# Patient Record
Sex: FEMALE | Race: WHITE | NOT HISPANIC OR LATINO | Employment: OTHER | URBAN - METROPOLITAN AREA
[De-identification: names, ages, dates, MRNs, and addresses within clinical notes are randomized per-mention and may not be internally consistent; named-entity substitution may affect disease eponyms.]

---

## 2020-12-01 PROBLEM — E11.8 CONTROLLED DIABETES MELLITUS TYPE 2 WITH COMPLICATIONS (HCC): Status: ACTIVE | Noted: 2020-12-01

## 2020-12-01 PROBLEM — E78.2 MIXED HYPERLIPIDEMIA: Status: ACTIVE | Noted: 2020-12-01

## 2020-12-01 PROBLEM — E03.4 HYPOTHYROIDISM DUE TO ACQUIRED ATROPHY OF THYROID: Status: ACTIVE | Noted: 2020-12-01

## 2020-12-01 PROBLEM — I10 ESSENTIAL HYPERTENSION: Status: ACTIVE | Noted: 2020-12-01

## 2020-12-01 PROBLEM — K21.9 GASTROESOPHAGEAL REFLUX DISEASE WITHOUT ESOPHAGITIS: Status: ACTIVE | Noted: 2020-12-01

## 2020-12-02 ENCOUNTER — TELEMEDICINE (OUTPATIENT)
Dept: MEDICAL GROUP | Facility: MEDICAL CENTER | Age: 75
End: 2020-12-02
Payer: MEDICARE

## 2020-12-02 DIAGNOSIS — I10 ESSENTIAL HYPERTENSION: ICD-10-CM

## 2020-12-02 DIAGNOSIS — E78.2 MIXED HYPERLIPIDEMIA: ICD-10-CM

## 2020-12-02 DIAGNOSIS — B02.29 POSTHERPETIC NEURALGIA: ICD-10-CM

## 2020-12-02 DIAGNOSIS — E03.4 HYPOTHYROIDISM DUE TO ACQUIRED ATROPHY OF THYROID: ICD-10-CM

## 2020-12-02 DIAGNOSIS — N89.8 VAGINAL DRYNESS: ICD-10-CM

## 2020-12-02 DIAGNOSIS — E11.8 CONTROLLED TYPE 2 DIABETES MELLITUS WITH COMPLICATION, WITHOUT LONG-TERM CURRENT USE OF INSULIN (HCC): ICD-10-CM

## 2020-12-02 DIAGNOSIS — K21.9 GASTROESOPHAGEAL REFLUX DISEASE WITHOUT ESOPHAGITIS: ICD-10-CM

## 2020-12-02 PROCEDURE — 99204 OFFICE O/P NEW MOD 45 MIN: CPT | Performed by: FAMILY MEDICINE

## 2020-12-02 RX ORDER — LEVOTHYROXINE SODIUM 0.07 MG/1
75 TABLET ORAL
Qty: 90 TAB | Refills: 3 | Status: SHIPPED | OUTPATIENT
Start: 2020-12-02 | End: 2021-09-14

## 2020-12-02 RX ORDER — GABAPENTIN 100 MG/1
200 CAPSULE ORAL 2 TIMES DAILY
Qty: 360 CAP | Refills: 2 | Status: SHIPPED | OUTPATIENT
Start: 2020-12-02 | End: 2021-02-09

## 2020-12-02 RX ORDER — LEVOTHYROXINE SODIUM 0.07 MG/1
TABLET ORAL
Qty: 30 TAB | Status: CANCELLED | OUTPATIENT
Start: 2020-12-02 | End: 2022-12-02

## 2020-12-02 RX ORDER — OMEPRAZOLE 20 MG/1
20 CAPSULE, DELAYED RELEASE ORAL DAILY
Qty: 90 CAP | Refills: 3 | Status: SHIPPED | OUTPATIENT
Start: 2020-12-02 | End: 2021-01-11 | Stop reason: SDUPTHER

## 2020-12-02 RX ORDER — GABAPENTIN 100 MG/1
CAPSULE ORAL
Qty: 90 CAP | Status: CANCELLED | OUTPATIENT
Start: 2020-12-02 | End: 2022-12-02

## 2020-12-02 RX ORDER — METFORMIN HYDROCHLORIDE 500 MG/1
500 TABLET, EXTENDED RELEASE ORAL 2 TIMES DAILY
Qty: 180 TAB | Refills: 3 | Status: SHIPPED | OUTPATIENT
Start: 2020-12-02 | End: 2021-09-14

## 2020-12-02 RX ORDER — ATORVASTATIN CALCIUM 40 MG/1
TABLET, FILM COATED ORAL
Qty: 30 TAB | Status: CANCELLED | OUTPATIENT
Start: 2020-12-02 | End: 2022-12-02

## 2020-12-02 RX ORDER — ATORVASTATIN CALCIUM 40 MG/1
40 TABLET, FILM COATED ORAL DAILY
Qty: 90 TAB | Refills: 3 | Status: SHIPPED | OUTPATIENT
Start: 2020-12-02 | End: 2021-09-14

## 2020-12-02 RX ORDER — METFORMIN HYDROCHLORIDE 500 MG/1
TABLET, EXTENDED RELEASE ORAL
Qty: 30 TAB | Status: CANCELLED | OUTPATIENT
Start: 2020-12-02 | End: 2022-12-02

## 2020-12-02 RX ORDER — METOPROLOL TARTRATE 100 MG/1
100 TABLET ORAL 2 TIMES DAILY
Qty: 180 TAB | Refills: 3 | Status: SHIPPED | OUTPATIENT
Start: 2020-12-02 | End: 2021-09-14

## 2020-12-02 RX ORDER — ESTRADIOL 0.1 MG/G
CREAM VAGINAL
Qty: 42.5 G | Status: CANCELLED | OUTPATIENT
Start: 2020-12-02 | End: 2024-06-21

## 2020-12-02 RX ORDER — LOSARTAN POTASSIUM 25 MG/1
25 TABLET ORAL DAILY
Qty: 90 TAB | Refills: 3 | Status: SHIPPED | OUTPATIENT
Start: 2020-12-02 | End: 2020-12-20 | Stop reason: SDUPTHER

## 2020-12-02 RX ORDER — METOPROLOL TARTRATE 100 MG/1
100 TABLET ORAL
Qty: 60 TAB | Status: CANCELLED | OUTPATIENT
Start: 2020-12-02 | End: 2022-12-02

## 2020-12-02 RX ORDER — LOSARTAN POTASSIUM 25 MG/1
TABLET ORAL
Qty: 30 TAB | Status: CANCELLED | OUTPATIENT
Start: 2020-12-02 | End: 2025-03-22

## 2020-12-03 NOTE — PROGRESS NOTES
Telemedicine Video Visit: Established Patient   This Remote Face to Face encounter was conducted via Zoom. Given the importance of social distancing and other strategies recommended to reduce the risk of COVID-19 transmission, I am providing medical care to this patient via audio/video visit in place of an in person visit at the request of the patient. Verbal consent to telehealth, risks, benefits, and consequences were discussed. Patient retains the right to withdraw at any time. All existing confidentiality protections apply. The patient has access to all transmitted medical information. No dissemination of any patient images or information to other entities without further written consent.  Subjective:   No chief complaint on file.    CC: Diabetes, hypertension, hyperlipidemia, hypothyroidism, acid reflux, vaginal dryness, postherpetic neuralgia    Kristin Reynolds Sophie is a 75 y.o. female establishing PCP.    Patient has been active and independent with all ADLs.  Has the following chronic medical issues:    Controlled type 2 diabetes mellitus with complication, without long-term current use of insulin ([facility])  Blood glucose has been controlled.  Last A1c was 7.4.  Denies polyuria and polydipsia.  Patient has been on Metformin  mg twice a day.  No side effects.  Needs medication refill    Essential hypertension  As per patient her blood pressure has been fine.  Unable to check it at home today because she does not have blood pressure machine.  Patient has been on losartan 25 mg daily, and metoprolol 100 mg twice a day.  Needs medication refill    Mixed hyperlipidemia  She has been tolerating the statin. Denies muscle pain LFTs has been normal, currently on atorvastatin 40 mg daily.  Needs medication refill    Hypothyroidism due to acquired atrophy of thyroid  She has been tolerating Levothyroxine, no palpitation, no cold or heat intolerance, has been on levothyroxine 75 mcg daily.  Needs medication  refill    Gastroesophageal reflux disease without esophagitis  Patient has been doing fine on omeprazole 20 mg daily.  Denies any epigastric pain, heartburn, nausea, vomiting.    Vaginal dryness  Patient uses estradiol vaginal cream, has been having    Postherpetic neuralgia  Was diagnosed with shingles on her right cheek in October 2020, finished antiviral medication, however she has been having numbness and mild pain around area.  Gabapentin 200 mg twice a day has been helping.    We will discuss has symptoms topics next visit    Patient Active Problem List    Diagnosis Date Noted   • Controlled diabetes mellitus type 2 with complications ([facility]) 12/01/2020   • Essential hypertension 12/01/2020   • Mixed hyperlipidemia 12/01/2020   • Hypothyroidism due to acquired atrophy of thyroid 12/01/2020   • Gastroesophageal reflux disease without esophagitis 12/01/2020       Current Outpatient Medications   Medication Sig Dispense Refill   • atorvastatin (LIPITOR) 40 MG Tab Take 1 Tab by mouth every day. 90 Tab 3   • gabapentin (NEURONTIN) 100 MG Cap Take 2 Caps by mouth 2 Times a Day. 360 Cap 2   • levothyroxine (SYNTHROID) 75 MCG Tab Take 1 Tab by mouth Every morning on an empty stomach. 90 Tab 3   • losartan (COZAAR) 25 MG Tab Take 1 Tab by mouth every day. 90 Tab 3   • metFORMIN ER (GLUCOPHAGE XR) 500 MG TABLET SR 24 HR Take 1 Tab by mouth 2 times a day. 180 Tab 3   • metoprolol (LOPRESSOR) 100 MG Tab Take 1 Tab by mouth 2 times a day. 180 Tab 3   • omeprazole (PRILOSEC) 20 MG delayed-release capsule Take 1 Cap by mouth every day. 90 Cap 3   • estradiol (ESTRACE) 0.1 MG/GM vaginal cream Insert  into the vagina.       No current facility-administered medications for this visit.          Allergies as of 12/02/2020 - never reviewed   Allergen Reaction Noted   • Warfarin  10/13/1998        Social History     Socioeconomic History   • Marital status:      Spouse name: Not on file   • Number of children: Not on file   •  Years of education: Not on file   • Highest education level: Not on file   Occupational History   • Not on file   Social Needs   • Financial resource strain: Not on file   • Food insecurity     Worry: Not on file     Inability: Not on file   • Transportation needs     Medical: Not on file     Non-medical: Not on file   Tobacco Use   • Smoking status: Never Smoker   • Smokeless tobacco: Never Used   Substance and Sexual Activity   • Alcohol use: Not Currently   • Drug use: Not Currently   • Sexual activity: Not on file   Lifestyle   • Physical activity     Days per week: Not on file     Minutes per session: Not on file   • Stress: Not on file   Relationships   • Social connections     Talks on phone: Not on file     Gets together: Not on file     Attends Mu-ism service: Not on file     Active member of club or organization: Not on file     Attends meetings of clubs or organizations: Not on file     Relationship status: Not on file   • Intimate partner violence     Fear of current or ex partner: Not on file     Emotionally abused: Not on file     Physically abused: Not on file     Forced sexual activity: Not on file   Other Topics Concern   • Not on file   Social History Narrative   • Not on file       No family history on file.    No past surgical history on file.    ROS:  Denies any Headache, Blurred Vision, Confusion Chest pain,  Shortness of breath,  Abdominal pain, Changes of bowel or bladder, Lower ext edema, Fevers, Nights sweats, Weight Changes, Focal weakness or numbness.  All other systems are negative.    There were no vitals taken for this visit.    Physical Exam:  Gen:         Alert and oriented, No apparent distress.  HEENT:   Perrla, TM clear,  Oralpharynx no erythema or exudates.  Neck:       No Jugular venous distension, Lymphadenopathy, Thyromegaly, Bruits.  Lungs:     Clear to auscultation bilaterally  CV:          Regular rate and rhythm. No murmurs, rubs or gallops.  Abd:         Soft non  tender, non distended. Normal active bowel sounds. No                                        Hepatosplenomegaly, No pulsatile masses.  Ext:          No clubbing, cyanosis, edema.      Assessment and Plan.   75 y.o. female     1. Controlled type 2 diabetes mellitus with complication, without long-term current use of insulin (HCC)  Last A1c was 7.4.  Continue on Metformin  mg twice a day.  Medication refilled.    - metFORMIN ER (GLUCOPHAGE XR) 500 MG TABLET SR 24 HR; Take 1 Tab by mouth 2 times a day.  Dispense: 180 Tab; Refill: 3    2. Essential hypertension  As per patient her blood pressure has been fine.  Unable to check it at home today because she does not have blood pressure machine.  Continue losartan 25 mg daily, and metoprolol 100 mg twice a day    - losartan (COZAAR) 25 MG Tab; Take 1 Tab by mouth every day.  Dispense: 90 Tab; Refill: 3  - metoprolol (LOPRESSOR) 100 MG Tab; Take 1 Tab by mouth 2 times a day.  Dispense: 180 Tab; Refill: 3    3. Mixed hyperlipidemia  He has been tolerating the statin. Denies muscle pain LFTs has been normal  Continue atorvastatin 40 mg daily  - atorvastatin (LIPITOR) 40 MG Tab; Take 1 Tab by mouth every day.  Dispense: 90 Tab; Refill: 3    4. Hypothyroidism due to acquired atrophy of thyroid  He has been tolerating Levothyroxine, no palpitation, no cold or heat intolerance  Continue on levothyroxine 75 mcg daily  - levothyroxine (SYNTHROID) 75 MCG Tab; Take 1 Tab by mouth Every morning on an empty stomach.  Dispense: 90 Tab; Refill: 3    5. Gastroesophageal reflux disease without esophagitis  Has been doing fine on omeprazole 20 mg daily  - omeprazole (PRILOSEC) 20 MG delayed-release capsule; Take 1 Cap by mouth every day.  Dispense: 90 Cap; Refill: 3    6. Vaginal dryness  Patient uses estradiol vaginal cream, has been having    7. Postherpetic neuralgia  Was diagnosed with shingles on her right cheek in October 2020, finished antiviral medication, however she has been  having numbness and mild pain around area.  Gabapentin has been helping.  Continue gabapentin 200 mg twice a day.  - gabapentin (NEURONTIN) 100 MG Cap; Take 2 Caps by mouth 2 Times a Day.  Dispense: 360 Cap; Refill: 2

## 2020-12-10 ENCOUNTER — PATIENT MESSAGE (OUTPATIENT)
Dept: MEDICAL GROUP | Facility: MEDICAL CENTER | Age: 75
End: 2020-12-10

## 2020-12-11 ENCOUNTER — PATIENT MESSAGE (OUTPATIENT)
Dept: MEDICAL GROUP | Facility: MEDICAL CENTER | Age: 75
End: 2020-12-11

## 2020-12-11 ENCOUNTER — TELEPHONE (OUTPATIENT)
Dept: MEDICAL GROUP | Facility: MEDICAL CENTER | Age: 75
End: 2020-12-11

## 2020-12-11 NOTE — TELEPHONE ENCOUNTER
Patient need to take her blood pressure medication again.  Check blood pressure twice a day for a week and send it to me after you restart blood pressure medication

## 2020-12-18 ENCOUNTER — PATIENT MESSAGE (OUTPATIENT)
Dept: MEDICAL GROUP | Facility: MEDICAL CENTER | Age: 75
End: 2020-12-18

## 2020-12-20 ENCOUNTER — PATIENT MESSAGE (OUTPATIENT)
Dept: MEDICAL GROUP | Facility: MEDICAL CENTER | Age: 75
End: 2020-12-20

## 2020-12-20 DIAGNOSIS — I10 ESSENTIAL HYPERTENSION: ICD-10-CM

## 2020-12-20 RX ORDER — LOSARTAN POTASSIUM 25 MG/1
TABLET ORAL
Qty: 270 TAB | Refills: 3 | Status: SHIPPED | OUTPATIENT
Start: 2020-12-20 | End: 2021-01-11 | Stop reason: SDUPTHER

## 2020-12-20 NOTE — TELEPHONE ENCOUNTER
From: Kristin Barrios  To: Noe Miller M.D.  Sent: 12/20/2020 11:27 AM PST  Subject: Prescription Question    Thank you for checking your emails.  I will need to have another prescription sent in to accommodate the increased usage of Losartan.   Rebecca Barrios   [phone]      ----- Message -----   From:Noe Miller M.D.   Sent:12/20/2020 11:02 AM PST   To:Kristin Barrios   Subject:RE: Prescription Question    Blood pressure readings are acceptable, continue current regimen      ----- Message -----   From:Kristin Barrios   Sent:12/18/2020 11:50 AM PST   To:Noe Miller M.D.   Subject:Prescription Question    Dr. Miller:  Dec. 11          145/93;131/88; 131/84    Dec. 12 139/81; 138/83; 135/82  152/84; 132/82; 125/79    Dec. 13 146/91; 127/93; 123/88  155/95; 147/93; 141/91    Dec. 14 129/92;141/90; 132/90  162/102; 155/92; 148/91    Dec. 15 143/89; 128/84; 128/81  156/96; 147/93; 147/93    Dec. 16 139/80; 121/72; 118/74   123/76; 116/73; 115/69    Dec. 17 147/91; 142/94; 134/89  142/80; 135/81; 140/87    Rebecca Barrios

## 2020-12-20 NOTE — TELEPHONE ENCOUNTER
From: Kristin Barrios  To: Noe Miller M.D.  Sent: 12/20/2020 11:54 AM PST  Subject: Prescription Question    75MG 2 tablets in the morning and 1 bedtime.  Rebecca Barrios       ----- Message -----   From:Noe Miller M.D.   Sent:12/20/2020 11:39 AM PST   To:Kristin Barriso   Subject:RE: Prescription Question    Many milligrams of losartan do you take right now.?      ----- Message -----   From:Kristin Barrios   Sent:12/20/2020 11:27 AM PST   To:Noe Miller M.D.   Subject:Prescription Question    Thank you for checking your emails.  I will need to have another prescription sent in to accommodate the increased usage of Losartan.   Rebecca Barrios   [id number]      ----- Message -----   From:Noe Miller M.D.   Sent:12/20/2020 11:02 AM PST   To:Kristin Barrios   Subject:RE: Prescription Question    Blood pressure readings are acceptable, continue current regimen      ----- Message -----   From:Kristin Barrios   Sent:12/18/2020 11:50 AM PST   To:Noe Miller M.D.   Subject:Prescription Question    Dr. Miller:  Dec. 11          145/93;131/88; 131/84    Dec. 12 139/81; 138/83; 135/82  152/84; 132/82; 125/79    Dec. 13 146/91; 127/93; 123/88  155/95; 147/93; 141/91    Dec. 14 129/92;141/90; 132/90  162/102; 155/92; 148/91    Dec. 15 143/89; 128/84; 128/81  156/96; 147/93; 147/93    Dec. 16 139/80; 121/72; 118/74   123/76; 116/73; 115/69    Dec. 17 147/91; 142/94; 134/89  142/80; 135/81; 140/87    Rebecca Barrios

## 2020-12-20 NOTE — TELEPHONE ENCOUNTER
From: Kristin Barrios  To: Noe Miller M.D.  Sent: 12/20/2020 11:16 AM PST  Subject: Prescription Question    Thank you.   I will need to have my Losartan prescription meds adjusted to meet the increase in use. Will you send a new prescription notification in?  Rebecca Barrios   [id number]      ----- Message -----   From:Noe Miller M.D.   Sent:12/20/2020 11:02 AM PST    To:Kristin Barrios   Subject:RE: Prescription Question    Blood pressure readings are acceptable, continue current regimen      ----- Message -----   From:Kristin Barrios   Sent:12/18/2020 11:50 AM PST   To:Noe Miller M.D.   Subject:Prescription Question    Dr. Miller:  Dec. 11          145/93;131/88; 131/84    Dec. 12 139/81; 138/83; 135/82  152/84; 132/82; 125/79    Dec. 13 146/91; 127/93; 123/88  155/95; 147/93; 141/91    Dec. 14 129/92;141/90; 132/90  162/102; 155/92; 148/91    Dec. 15 143/89; 128/84; 128/81  156/96; 147/93; 147/93    Dec. 16 139/80; 121/72; 118/74   123/76; 116/73; 115/69    Dec. 17 147/91; 142/94; 134/89  142/80; 135/81; 140/87    Rebecca Barrios

## 2021-01-07 SDOH — HEALTH STABILITY: PHYSICAL HEALTH: ON AVERAGE, HOW MANY MINUTES DO YOU ENGAGE IN EXERCISE AT THIS LEVEL?: 30 MINUTES

## 2021-01-07 SDOH — ECONOMIC STABILITY: HOUSING INSECURITY: IN THE LAST 12 MONTHS, HOW MANY PLACES HAVE YOU LIVED?: 2

## 2021-01-07 SDOH — HEALTH STABILITY: MENTAL HEALTH
STRESS IS WHEN SOMEONE FEELS TENSE, NERVOUS, ANXIOUS, OR CAN'T SLEEP AT NIGHT BECAUSE THEIR MIND IS TROUBLED. HOW STRESSED ARE YOU?: NOT AT ALL

## 2021-01-07 SDOH — ECONOMIC STABILITY: HOUSING INSECURITY
IN THE LAST 12 MONTHS, WAS THERE A TIME WHEN YOU DID NOT HAVE A STEADY PLACE TO SLEEP OR SLEPT IN A SHELTER (INCLUDING NOW)?: NO

## 2021-01-07 SDOH — ECONOMIC STABILITY: TRANSPORTATION INSECURITY
IN THE PAST 12 MONTHS, HAS THE LACK OF TRANSPORTATION KEPT YOU FROM MEDICAL APPOINTMENTS OR FROM GETTING MEDICATIONS?: NO

## 2021-01-07 SDOH — ECONOMIC STABILITY: INCOME INSECURITY: IN THE LAST 12 MONTHS, WAS THERE A TIME WHEN YOU WERE NOT ABLE TO PAY THE MORTGAGE OR RENT ON TIME?: NO

## 2021-01-07 SDOH — HEALTH STABILITY: PHYSICAL HEALTH: ON AVERAGE, HOW MANY DAYS PER WEEK DO YOU ENGAGE IN MODERATE TO STRENUOUS EXERCISE (LIKE A BRISK WALK)?: 5 DAYS

## 2021-01-07 SDOH — ECONOMIC STABILITY: TRANSPORTATION INSECURITY
IN THE PAST 12 MONTHS, HAS LACK OF RELIABLE TRANSPORTATION KEPT YOU FROM MEDICAL APPOINTMENTS, MEETINGS, WORK OR FROM GETTING THINGS NEEDED FOR DAILY LIVING?: NO

## 2021-01-07 ASSESSMENT — SOCIAL DETERMINANTS OF HEALTH (SDOH)
HOW HARD IS IT FOR YOU TO PAY FOR THE VERY BASICS LIKE FOOD, HOUSING, MEDICAL CARE, AND HEATING?: NOT HARD AT ALL
DO YOU BELONG TO ANY CLUBS OR ORGANIZATIONS SUCH AS CHURCH GROUPS UNIONS, FRATERNAL OR ATHLETIC GROUPS, OR SCHOOL GROUPS?: DECLINE
HOW OFTEN DO YOU GET TOGETHER WITH FRIENDS OR RELATIVES?: DECLINE
WITHIN THE PAST 12 MONTHS, YOU WORRIED THAT YOUR FOOD WOULD RUN OUT BEFORE YOU GOT THE MONEY TO BUY MORE: NEVER TRUE
HOW OFTEN DO YOU ATTEND CHURCH OR RELIGIOUS SERVICES?: DECLINE
WITHIN THE PAST 12 MONTHS, THE FOOD YOU BOUGHT JUST DIDN'T LAST AND YOU DIDN'T HAVE MONEY TO GET MORE: NEVER TRUE
HOW OFTEN DO YOU HAVE SIX OR MORE DRINKS ON ONE OCCASION: NEVER
IN A TYPICAL WEEK, HOW MANY TIMES DO YOU TALK ON THE PHONE WITH FAMILY, FRIENDS, OR NEIGHBORS?: MORE THAN THREE TIMES A WEEK
HOW OFTEN DO YOU HAVE A DRINK CONTAINING ALCOHOL: NEVER
HOW MANY DRINKS CONTAINING ALCOHOL DO YOU HAVE ON A TYPICAL DAY WHEN YOU ARE DRINKING: DECLINE

## 2021-01-11 ENCOUNTER — TELEMEDICINE (OUTPATIENT)
Dept: MEDICAL GROUP | Facility: PHYSICIAN GROUP | Age: 76
End: 2021-01-11
Payer: MEDICARE

## 2021-01-11 VITALS — WEIGHT: 165 LBS | BODY MASS INDEX: 28.17 KG/M2 | HEIGHT: 64 IN

## 2021-01-11 DIAGNOSIS — E03.4 HYPOTHYROIDISM DUE TO ACQUIRED ATROPHY OF THYROID: ICD-10-CM

## 2021-01-11 DIAGNOSIS — Z23 NEED FOR VACCINATION: ICD-10-CM

## 2021-01-11 DIAGNOSIS — I10 ESSENTIAL HYPERTENSION: ICD-10-CM

## 2021-01-11 DIAGNOSIS — E11.8 CONTROLLED TYPE 2 DIABETES MELLITUS WITH COMPLICATION, WITHOUT LONG-TERM CURRENT USE OF INSULIN (HCC): ICD-10-CM

## 2021-01-11 DIAGNOSIS — K21.9 GASTROESOPHAGEAL REFLUX DISEASE WITHOUT ESOPHAGITIS: ICD-10-CM

## 2021-01-11 PROBLEM — K44.9 HIATAL HERNIA: Status: ACTIVE | Noted: 2020-11-09

## 2021-01-11 PROBLEM — Z90.710 HX OF HYSTERECTOMY: Status: RESOLVED | Noted: 2019-04-04 | Resolved: 2021-01-11

## 2021-01-11 PROBLEM — Z90.710 HX OF HYSTERECTOMY: Status: ACTIVE | Noted: 2019-04-04

## 2021-01-11 PROBLEM — Z85.828 HISTORY OF BASAL CELL CARCINOMA (BCC): Status: ACTIVE | Noted: 2019-04-04

## 2021-01-11 PROCEDURE — 99214 OFFICE O/P EST MOD 30 MIN: CPT | Performed by: FAMILY MEDICINE

## 2021-01-11 RX ORDER — OMEPRAZOLE 20 MG/1
20 CAPSULE, DELAYED RELEASE ORAL 2 TIMES DAILY
Qty: 180 CAP | Refills: 3 | Status: SHIPPED | OUTPATIENT
Start: 2021-01-11 | End: 2021-11-10

## 2021-01-11 RX ORDER — LOSARTAN POTASSIUM 50 MG/1
50 TABLET ORAL EVERY MORNING
Qty: 90 TAB | Refills: 3 | Status: SHIPPED | OUTPATIENT
Start: 2021-01-11 | End: 2021-11-10

## 2021-01-11 RX ORDER — LOSARTAN POTASSIUM 25 MG/1
25 TABLET ORAL
Qty: 90 TAB | Refills: 3 | Status: SHIPPED | OUTPATIENT
Start: 2021-01-11 | End: 2022-01-03

## 2021-01-11 ASSESSMENT — PATIENT HEALTH QUESTIONNAIRE - PHQ9: CLINICAL INTERPRETATION OF PHQ2 SCORE: 0

## 2021-01-12 NOTE — PROGRESS NOTES
Virtual Visit: New Patient   This visit was conducted via Zoom using secure and encrypted videoconferencing technology. The patient was in a private location in the state of [place].    The patient's identity was confirmed and verbal consent was obtained for this virtual visit.    Subjective:     CC:   Chief Complaint   Patient presents with   • Establish Care     Prior PCP     • Hypertension Follow-up     Discuss losartan, previously taking 2 meds- review BP log, BP elevated    • Gastrophageal Reflux     taking omeprazole BID previoulsy now QD and having symptoms again        Kristin Barrios is a 75 y.o. female presenting to establish care (prior PCP Dr. Miller) and to discuss the evaluation and management of:    Controlled diabetes mellitus type 2 with complications (HCC)  This is a chronic condition.  Current medications: metformin  mg bid  Last A1c: 7.4% (2020)  Last Microalb/Cr ratio: due  Last diabetic foot exam: deferred for in person visit  Last retinal eye exam: >1 year ago  ACEi/ARB? Losartan 25 mg  Statin? Atorvastatin 40 mg   Aspirin? Recommended due to age>49 and having HTN   Concomitant HTN? Yes - goal unknown   Nightly foot checks? no      Hypothyroidism due to acquired atrophy of thyroid  This is a chronic condition. She is on levothyroxine 75 mcg daily. Last TSH in 10/2020 was wnl.    Essential hypertension  This is a chronic condition.  Current Meds: losartan 50 mg qAM + 25 mg qPM, metoprolol 100 mg bid  Side effects: none  Home BP Los-140s/60s-80s  Associated symptoms: no cp, no sob      Gastroesophageal reflux disease without esophagitis  This is a chronic condition. She reports she has always had very severe acid reflux and all her family members have had severe acid reflux. She was on omeprazole 20 mg bid but her last PCP dropped her to once/day. She reports with the decrease in dose her symptoms are worsening. She has an endoscopy from 10/2020 showing severe hiatal  hernia and she was told to continue omeprazole twice daily.    ROS  See HPI  Constitutional: Negative for fever, chills.   HENT: Negative for congestion.    Eyes: Negative for pain.   Respiratory: Negative for cough.  Cardiovascular: Negative for chest pain  Gastrointestinal: Negative for nausea, vomiting.   Genitourinary: Negative for hematuria.   Skin: Negative for rash.   Neurological: Negative for headaches.   Endo/Heme/Allergies: Does not bruise/bleed easily.   Psychiatric/Behavioral: Negative for depression.  The patient is not nervous/anxious.      Allergies   Allergen Reactions   • Lisinopril Cough     Cough 2/6/2008   • Warfarin      1998-10-13;BLEEDING       Current medicines (including changes today)  Current Outpatient Medications   Medication Sig Dispense Refill   • losartan (COZAAR) 25 MG Tab Take 1 Tab by mouth every bedtime. Take with losartan 50 mg for total daily dose of 75 mg. 90 Tab 3   • losartan (COZAAR) 50 MG Tab Take 1 Tab by mouth every morning. Take with losartan 25 mg for total daily dose of 75 mg. 90 Tab 3   • omeprazole (PRILOSEC) 20 MG delayed-release capsule Take 1 Cap by mouth 2 times a day. 180 Cap 3   • atorvastatin (LIPITOR) 40 MG Tab Take 1 Tab by mouth every day. 90 Tab 3   • gabapentin (NEURONTIN) 100 MG Cap Take 2 Caps by mouth 2 Times a Day. 360 Cap 2   • levothyroxine (SYNTHROID) 75 MCG Tab Take 1 Tab by mouth Every morning on an empty stomach. 90 Tab 3   • metFORMIN ER (GLUCOPHAGE XR) 500 MG TABLET SR 24 HR Take 1 Tab by mouth 2 times a day. 180 Tab 3   • metoprolol (LOPRESSOR) 100 MG Tab Take 1 Tab by mouth 2 times a day. 180 Tab 3   • estradiol (ESTRACE) 0.1 MG/GM vaginal cream Insert  into the vagina.       No current facility-administered medications for this visit.        She  has no past medical history on file.  She  has a past surgical history that includes cholecystectomy and abdominal hysterectomy total.      Family History   Problem Relation Age of Onset   •  "Cancer Father    • Cancer Sister         renal   • Diabetes Neg Hx      No family status information on file.       Patient Active Problem List    Diagnosis Date Noted   • Controlled diabetes mellitus type 2 with complications (HCC) 12/01/2020   • Essential hypertension 12/01/2020   • Mixed hyperlipidemia 12/01/2020   • Hypothyroidism due to acquired atrophy of thyroid 12/01/2020   • Gastroesophageal reflux disease without esophagitis 12/01/2020   • Hiatal hernia 11/09/2020   • History of basal cell carcinoma (BCC) 04/04/2019          Objective:   Ht 1.626 m (5' 4\") Comment: Patient reported  Wt 74.8 kg (165 lb) Comment: Patient reported  BMI 28.32 kg/m²  RR: 12    Physical Exam:  Constitutional: Alert, no distress, well-groomed.  Skin: No rashes in visible areas.  Eye: Round. Conjunctiva clear, lids normal. No icterus.   ENMT: Lips pink without lesions, good dentition, moist mucous membranes. Phonation normal.  Neck: No masses, no thyromegaly. Moves freely without pain.  Respiratory: Unlabored respiratory effort, no cough or audible wheeze  Psych: Alert and oriented x3, normal affect and mood.       Assessment and Plan:   The following treatment plan was discussed:     1. Controlled type 2 diabetes mellitus with complication, without long-term current use of insulin (HCC)  This is a chronic condition, controlled.  Hemoglobin A1c was 7.4% in June, 2020.  Based on her age and comorbidities our goal would be under 7.5%.  She is due for a repeat A1c and have her microalbumin to creatinine ratio checked.  Diabetic foot exam is deferred for an in person visit.  I have encouraged her to establish with an optometrist as she has not had an eye exam in over a year.  -Continue metformin  mg twice daily  - HEMOGLOBIN A1C; Future  - MICROALBUMIN CREAT RATIO URINE (LAB COLLECT); Future    2. Hypothyroidism due to acquired atrophy of thyroid  This is a chronic condition, controlled.  She has a longstanding history of " hypothyroidism and is on 75 mcg of levothyroxine daily.  TSH from October, 2020 was within normal limits.  -Continue levothyroxine 75 mcg daily    3. Essential hypertension  This is a chronic condition, controlled.  She reports home blood pressures consistently under 150/90.  However, her current regimen of taking 3 pills of losartan a day is difficult to get filled by [facility] as they will not allow more than 2 pills in a day.  Therefore, I will give her 2 different dosages of medication so she can continue getting 75 mg total daily.  - losartan (COZAAR) 25 MG Tab; Take 1 Tab by mouth every bedtime. Take with losartan 50 mg for total daily dose of 75 mg.  Dispense: 90 Tab; Refill: 3  - losartan (COZAAR) 50 MG Tab; Take 1 Tab by mouth every morning. Take with losartan 25 mg for total daily dose of 75 mg.  Dispense: 90 Tab; Refill: 3    4. Gastroesophageal reflux disease without esophagitis  This is a chronic condition, uncontrolled.  She reports a longstanding history of severe acid reflux.  She used to see GI before moving here quite regularly and has an upper endoscopy from October, 2020.  Endoscopy does show a large hiatal hernia which is likely contributing to her severe acid reflux.  She used to be on omeprazole twice daily but her prior PCP dropped her down to once daily and she states the symptoms are worse.  We will put her back on the twice daily since she has a large hiatal hernia.  - omeprazole (PRILOSEC) 20 MG delayed-release capsule; Take 1 Cap by mouth 2 times a day.  Dispense: 180 Cap; Refill: 3    Follow-up: Return in about 4 weeks (around 2/8/2021) for f/u labs, review med history.

## 2021-01-12 NOTE — ASSESSMENT & PLAN NOTE
This is a chronic condition.  Current Meds: losartan 50 mg qAM + 25 mg qPM, metoprolol 100 mg bid  Side effects: none  Home BP Los-140s/60s-80s  Associated symptoms: no cp, no sob

## 2021-01-12 NOTE — ASSESSMENT & PLAN NOTE
This is a chronic condition. She reports she has always had very severe acid reflux and all her family members have had severe acid reflux. She was on omeprazole 20 mg bid but her last PCP dropped her to once/day. She reports with the decrease in dose her symptoms are worsening. She has an endoscopy from 10/2020 showing severe hiatal hernia and she was told to continue omeprazole twice daily.

## 2021-01-12 NOTE — ASSESSMENT & PLAN NOTE
This is a chronic condition.  Current medications: metformin  mg bid  Last A1c: 7.4% (6/2020)  Last Microalb/Cr ratio: due  Last diabetic foot exam: deferred for in person visit  Last retinal eye exam: >1 year ago  ACEi/ARB? Losartan 25 mg  Statin? Atorvastatin 40 mg   Aspirin? Recommended due to age>49 and having HTN   Concomitant HTN? Yes - goal unknown   Nightly foot checks? no

## 2021-01-19 ENCOUNTER — HOSPITAL ENCOUNTER (OUTPATIENT)
Dept: RADIOLOGY | Facility: MEDICAL CENTER | Age: 76
End: 2021-01-19
Attending: FAMILY MEDICINE
Payer: MEDICARE

## 2021-01-19 DIAGNOSIS — Z12.31 VISIT FOR SCREENING MAMMOGRAM: ICD-10-CM

## 2021-01-19 PROCEDURE — 77063 BREAST TOMOSYNTHESIS BI: CPT

## 2021-01-22 ENCOUNTER — HOSPITAL ENCOUNTER (OUTPATIENT)
Dept: RADIOLOGY | Facility: MEDICAL CENTER | Age: 76
End: 2021-01-22
Payer: MEDICARE

## 2021-01-26 ENCOUNTER — APPOINTMENT (OUTPATIENT)
Dept: FAMILY PLANNING/WOMEN'S HEALTH CLINIC | Facility: IMMUNIZATION CENTER | Age: 76
End: 2021-01-26
Attending: INTERNAL MEDICINE
Payer: MEDICARE

## 2021-01-26 ENCOUNTER — IMMUNIZATION (OUTPATIENT)
Dept: FAMILY PLANNING/WOMEN'S HEALTH CLINIC | Facility: IMMUNIZATION CENTER | Age: 76
End: 2021-01-26
Payer: MEDICARE

## 2021-01-26 DIAGNOSIS — Z23 NEED FOR VACCINATION: ICD-10-CM

## 2021-01-26 DIAGNOSIS — Z23 ENCOUNTER FOR VACCINATION: Primary | ICD-10-CM

## 2021-01-26 PROCEDURE — 0001A PFIZER SARS-COV-2 VACCINE: CPT | Performed by: INTERNAL MEDICINE

## 2021-01-26 PROCEDURE — 91300 PFIZER SARS-COV-2 VACCINE: CPT | Performed by: INTERNAL MEDICINE

## 2021-02-03 ENCOUNTER — TELEPHONE (OUTPATIENT)
Dept: MEDICAL GROUP | Facility: PHYSICIAN GROUP | Age: 76
End: 2021-02-03

## 2021-02-03 NOTE — TELEPHONE ENCOUNTER
ESTABLISHED PATIENT PRE-VISIT PLANNING     Patient was NOT contacted to complete PVP.     Note: Patient will not be contacted if there is no indication to call.     1.  Reviewed notes from the last few office visits within the medical group: Yes    2.  If any orders were placed at last visit or intended to be done for this visit (i.e. 6 mos follow-up), do we have Results/Consult Notes?         •  Labs - Labs ordered, NOT completed. Patient advised to complete prior to next appointment.  Note: If patient appointment is for lab review and patient did not complete labs, check with provider if OK to reschedule patient until labs completed.       •  Imaging - Imaging was not ordered at last office visit.       •  Referrals - No referrals were ordered at last office visit.    3. Is this appointment scheduled as a Hospital Follow-Up? No    4.  Immunizations were updated in Epic using Reconcile Outside Information activity? No    5.  Patient is due for the following Health Maintenance Topics:   Health Maintenance Due   Topic Date Due   • Annual Wellness Visit  1945   • URINE ACR / MICROALBUMIN  04/18/1946   • DIABETES MONOFILAMENT / LE EXAM  04[birth year]18/1946   • RETINAL SCREENING  10/18/1961   • COLONOSCOPY  10/18/1993   • BONE DENSITY  10/18/2008   • IMM ZOSTER VACCINES (2 of 3) 12/15/2009   • A1C SCREENING  12/05/2020       - Patient plans to schedule appointment for Annual Wellness Visit (AWV), Colonoscopy/FIT, Dexa Scan, Immunizations: SHINGRIX (Shingles) and Retinal Eye Exam.    6.  AHA (Pulse8) form printed for Provider? N/A

## 2021-02-04 ENCOUNTER — HOSPITAL ENCOUNTER (OUTPATIENT)
Dept: LAB | Facility: MEDICAL CENTER | Age: 76
End: 2021-02-04
Attending: FAMILY MEDICINE
Payer: MEDICARE

## 2021-02-04 DIAGNOSIS — E11.8 CONTROLLED TYPE 2 DIABETES MELLITUS WITH COMPLICATION, WITHOUT LONG-TERM CURRENT USE OF INSULIN (HCC): ICD-10-CM

## 2021-02-04 LAB
CREAT UR-MCNC: 190.59 MG/DL
EST. AVERAGE GLUCOSE BLD GHB EST-MCNC: 163 MG/DL
HBA1C MFR BLD: 7.3 % (ref 0–5.6)
MICROALBUMIN UR-MCNC: 2.9 MG/DL
MICROALBUMIN/CREAT UR: 15 MG/G (ref 0–30)

## 2021-02-04 PROCEDURE — 83036 HEMOGLOBIN GLYCOSYLATED A1C: CPT

## 2021-02-04 PROCEDURE — 82043 UR ALBUMIN QUANTITATIVE: CPT

## 2021-02-04 PROCEDURE — 82570 ASSAY OF URINE CREATININE: CPT

## 2021-02-04 PROCEDURE — 36415 COLL VENOUS BLD VENIPUNCTURE: CPT

## 2021-02-09 ENCOUNTER — TELEMEDICINE (OUTPATIENT)
Dept: MEDICAL GROUP | Facility: PHYSICIAN GROUP | Age: 76
End: 2021-02-09
Payer: MEDICARE

## 2021-02-09 VITALS — HEIGHT: 64 IN | WEIGHT: 165 LBS | BODY MASS INDEX: 28.17 KG/M2

## 2021-02-09 DIAGNOSIS — N39.0 RECURRENT UTI: ICD-10-CM

## 2021-02-09 DIAGNOSIS — Z78.0 POSTMENOPAUSAL: ICD-10-CM

## 2021-02-09 DIAGNOSIS — E11.8 CONTROLLED TYPE 2 DIABETES MELLITUS WITH COMPLICATION, WITHOUT LONG-TERM CURRENT USE OF INSULIN (HCC): Primary | ICD-10-CM

## 2021-02-09 PROCEDURE — 99214 OFFICE O/P EST MOD 30 MIN: CPT | Mod: 95,CR | Performed by: FAMILY MEDICINE

## 2021-02-09 RX ORDER — ESTRADIOL 0.1 MG/G
1 CREAM VAGINAL
Qty: 42.5 G | Refills: 3 | Status: SHIPPED | OUTPATIENT
Start: 2021-02-09 | End: 2022-08-11

## 2021-02-09 NOTE — ASSESSMENT & PLAN NOTE
This is a chronic condition. She reports she used get recurrent UTI until she was started on vaginal estrogen. She uses it twice/week. She admits she doesn't always remember to use it twice/week and has noticed some discomfort weekly.

## 2021-02-09 NOTE — PROGRESS NOTES
Virtual Visit: Established Patient   This visit was conducted via Zoom using secure and encrypted videoconferencing technology. The patient was in a private location in the state of [place].    The patient's identity was confirmed and verbal consent was obtained for this virtual visit.    Subjective:   CC:   Chief Complaint   Patient presents with   • Lab Results   • Medication Management     Stopped gabapentin        Kristin Barrios is a 75 y.o. female presenting for evaluation and management of:    Controlled diabetes mellitus type 2 with complications (HCC)  This is a chronic condition.  Current medications: metformin  mg bid  Last A1c: 7.3% 92/2021); 7.4% (6/2020)  Last Microalb/Cr ratio: normal (2/2021)  Last diabetic foot exam: deferred for in person visit  Last retinal eye exam: appt 3/2021  ACEi/ARB? Losartan 25 mg  Statin? Atorvastatin 40 mg   Aspirin? Recommended due to age>49 and having HTN   Concomitant HTN? Yes - goal unknown   Nightly foot checks? no      Recurrent UTI  This is a chronic condition. She reports she used get recurrent UTI until she was started on vaginal estrogen. She uses it twice/week. She admits she doesn't always remember to use it twice/week and has noticed some discomfort weekly.       ROS   Denies any recent fevers or chills. No chest pains or shortness of breath.     Allergies   Allergen Reactions   • Lisinopril Cough     Cough 2/6/2008   • Warfarin      1998-10-13;BLEEDING       Current medicines (including changes today)  Current Outpatient Medications   Medication Sig Dispense Refill   • Ferrous Sulfate (IRON PO) Take  by mouth every day.     • estradiol (ESTRACE) 0.1 MG/GM vaginal cream Insert 1 g into the vagina every 72 hours. 42.5 g 3   • losartan (COZAAR) 25 MG Tab Take 1 Tab by mouth every bedtime. Take with losartan 50 mg for total daily dose of 75 mg. 90 Tab 3   • losartan (COZAAR) 50 MG Tab Take 1 Tab by mouth every morning. Take with losartan 25 mg for total  "daily dose of 75 mg. 90 Tab 3   • omeprazole (PRILOSEC) 20 MG delayed-release capsule Take 1 Cap by mouth 2 times a day. 180 Cap 3   • atorvastatin (LIPITOR) 40 MG Tab Take 1 Tab by mouth every day. 90 Tab 3   • levothyroxine (SYNTHROID) 75 MCG Tab Take 1 Tab by mouth Every morning on an empty stomach. 90 Tab 3   • metFORMIN ER (GLUCOPHAGE XR) 500 MG TABLET SR 24 HR Take 1 Tab by mouth 2 times a day. 180 Tab 3   • metoprolol (LOPRESSOR) 100 MG Tab Take 1 Tab by mouth 2 times a day. 180 Tab 3     No current facility-administered medications for this visit.        Patient Active Problem List    Diagnosis Date Noted   • Recurrent UTI 02/09/2021   • Controlled diabetes mellitus type 2 with complications (HCC) 12/01/2020   • Essential hypertension 12/01/2020   • Mixed hyperlipidemia 12/01/2020   • Hypothyroidism due to acquired atrophy of thyroid 12/01/2020   • Gastroesophageal reflux disease without esophagitis 12/01/2020   • Hiatal hernia 11/09/2020   • History of basal cell carcinoma (BCC) 04/04/2019       Family History   Problem Relation Age of Onset   • Cancer Father    • Cancer Sister         renal   • Diabetes Neg Hx        She  has no past medical history on file.  She  has a past surgical history that includes cholecystectomy; abdominal hysterectomy total; and lumpectomy (Right).       Objective:   Ht 1.626 m (5' 4\") Comment: Patient reported  Wt 74.8 kg (165 lb) Comment: Patient reported  BMI 28.32 kg/m²  RR: 12    Physical Exam:  Constitutional: Alert, no distress, well-groomed.  Skin: No rashes in visible areas.  Eye: Round. Conjunctiva clear, lids normal. No icterus.   ENMT: Lips pink without lesions, good dentition, moist mucous membranes. Phonation normal.  Neck: No masses, no thyromegaly. Moves freely without pain.  Respiratory: Unlabored respiratory effort, no cough or audible wheeze  Psych: Alert and oriented x3, normal affect and mood.       Assessment and Plan:   The following treatment plan was " discussed:     1. Controlled type 2 diabetes mellitus with complication, without long-term current use of insulin (HCC)  This is a chronic condition, controlled.  Recent hemoglobin A1c is under 7.5% which is her goal due to her age and comorbidities.  She is up-to-date with all of her lab work for diabetes.  She has an appointment for an eye exam and will come in for an in person visit for diabetic foot exam in the near future.  -Continue metformin  mg twice daily    2. Recurrent UTI  This is a chronic condition, stable.  She states that she is to get recurrent, frequent UTIs.  She was and started on estradiol vaginal cream which nearly resolved UTIs.  However, she mitts that she is not always good at taking it twice a week and in the last week has been noticing some discomfort in the vaginal area.  He does not feel like a UTI.  I encouraged her to get back on twice weekly vaginal cream and if she continues have discomfort she will make an appointment to address it further.    3. Postmenopausal  Her last DEXA was in 2011.  - DS-BONE DENSITY STUDY (DEXA); Future    Follow-up: Return for Medicare Wellness, DM foot exam.

## 2021-02-09 NOTE — LETTER
[facility]  Hamida Hooper M.D.  [address]  [place] [address]  Fax: [phone]   Authorization for Release/Disclosure of   Protected Health Information   Name: YUMIKO BARRIOS : 1945 SSN: [id number]   Address: [address] [place] [address]  [place] [address] Phone:    [phone] (home)    I authorize the entity listed below to release/disclose the PHI below to:   [facility]/Hamida Hooper M.D. and Hamida Hooper M.D.   Provider or Entity Name:  [place], [place]    Address   City, State, [place]   Phone:      Fax:        Reason for request: continuity of care   Information to be released:    [ X ] LAST COLONOSCOPY,  including any PATH REPORT and follow-up  [ X ] LAST FIT/COLOGUARD RESULT [  ] LAST DEXA  [  ] LAST MAMMOGRAM  [  ] LAST PAP  [  ] LAST LABS [  ] RETINA EXAM REPORT  [  ] IMMUNIZATION RECORDS  [  ] Release all info      [  ] Check here and initial the line next to each item to release ALL health information INCLUDING  _____ Care and treatment for drug and / or alcohol abuse  _____ HIV testing, infection status, or AIDS  _____ Genetic Testing    DATES OF SERVICE OR TIME PERIOD TO BE DISCLOSED: _____________  I understand and acknowledge that:  * This Authorization may be revoked at any time by you in writing, except if your health information has already been used or disclosed.  * Your health information that will be used or disclosed as a result of you signing this authorization could be re-disclosed by the recipient. If this occurs, your re-disclosed health information may no longer be protected by State or Federal laws.  * You may refuse to sign this Authorization. Your refusal will not affect your ability to obtain treatment.  * This Authorization becomes effective upon signing and will  on (date) __________.      If no date is indicated, this Authorization will  one (1) year from the signature date.    Name: Yumiko Barrios    Signature:   Date:     2021         PLEASE FAX REQUESTED RECORDS BACK TO: [phone]

## 2021-02-09 NOTE — ASSESSMENT & PLAN NOTE
This is a chronic condition.  Current medications: metformin  mg bid  Last A1c: 7.3% 92/2021); 7.4% (6/2020)  Last Microalb/Cr ratio: normal (2/2021)  Last diabetic foot exam: deferred for in person visit  Last retinal eye exam: appt 3/2021  ACEi/ARB? Losartan 25 mg  Statin? Atorvastatin 40 mg   Aspirin? Recommended due to age>49 and having HTN   Concomitant HTN? Yes - goal unknown   Nightly foot checks? no

## 2021-02-18 ENCOUNTER — IMMUNIZATION (OUTPATIENT)
Dept: FAMILY PLANNING/WOMEN'S HEALTH CLINIC | Facility: IMMUNIZATION CENTER | Age: 76
End: 2021-02-18
Attending: INTERNAL MEDICINE
Payer: MEDICARE

## 2021-02-18 DIAGNOSIS — Z23 ENCOUNTER FOR VACCINATION: Primary | ICD-10-CM

## 2021-02-18 PROCEDURE — 0002A PFIZER SARS-COV-2 VACCINE: CPT

## 2021-02-18 PROCEDURE — 91300 PFIZER SARS-COV-2 VACCINE: CPT

## 2021-02-24 ENCOUNTER — HOSPITAL ENCOUNTER (OUTPATIENT)
Dept: RADIOLOGY | Facility: MEDICAL CENTER | Age: 76
End: 2021-02-24
Attending: FAMILY MEDICINE
Payer: MEDICARE

## 2021-02-24 DIAGNOSIS — Z78.0 POSTMENOPAUSAL: ICD-10-CM

## 2021-02-24 PROCEDURE — 77080 DXA BONE DENSITY AXIAL: CPT

## 2021-03-17 ENCOUNTER — OFFICE VISIT (OUTPATIENT)
Dept: MEDICAL GROUP | Facility: PHYSICIAN GROUP | Age: 76
End: 2021-03-17
Payer: MEDICARE

## 2021-03-17 ENCOUNTER — TELEPHONE (OUTPATIENT)
Dept: MEDICAL GROUP | Facility: PHYSICIAN GROUP | Age: 76
End: 2021-03-17

## 2021-03-17 VITALS
TEMPERATURE: 97 F | SYSTOLIC BLOOD PRESSURE: 130 MMHG | DIASTOLIC BLOOD PRESSURE: 84 MMHG | HEART RATE: 82 BPM | WEIGHT: 164.6 LBS | HEIGHT: 64 IN | BODY MASS INDEX: 28.1 KG/M2 | OXYGEN SATURATION: 98 % | RESPIRATION RATE: 16 BRPM

## 2021-03-17 DIAGNOSIS — Z00.00 ENCOUNTER FOR MEDICARE ANNUAL WELLNESS EXAM: ICD-10-CM

## 2021-03-17 DIAGNOSIS — E78.2 MIXED HYPERLIPIDEMIA: ICD-10-CM

## 2021-03-17 DIAGNOSIS — Z85.828 HISTORY OF BASAL CELL CARCINOMA (BCC): ICD-10-CM

## 2021-03-17 DIAGNOSIS — K44.9 HIATAL HERNIA: ICD-10-CM

## 2021-03-17 DIAGNOSIS — E03.4 HYPOTHYROIDISM DUE TO ACQUIRED ATROPHY OF THYROID: ICD-10-CM

## 2021-03-17 DIAGNOSIS — N39.0 RECURRENT UTI: ICD-10-CM

## 2021-03-17 DIAGNOSIS — K21.9 GASTROESOPHAGEAL REFLUX DISEASE WITHOUT ESOPHAGITIS: ICD-10-CM

## 2021-03-17 DIAGNOSIS — I10 ESSENTIAL HYPERTENSION: ICD-10-CM

## 2021-03-17 DIAGNOSIS — E11.8 CONTROLLED TYPE 2 DIABETES MELLITUS WITH COMPLICATION, WITHOUT LONG-TERM CURRENT USE OF INSULIN (HCC): ICD-10-CM

## 2021-03-17 PROCEDURE — G0439 PPPS, SUBSEQ VISIT: HCPCS | Performed by: FAMILY MEDICINE

## 2021-03-17 ASSESSMENT — PATIENT HEALTH QUESTIONNAIRE - PHQ9: CLINICAL INTERPRETATION OF PHQ2 SCORE: 0

## 2021-03-17 ASSESSMENT — ACTIVITIES OF DAILY LIVING (ADL): BATHING_REQUIRES_ASSISTANCE: 0

## 2021-03-17 ASSESSMENT — ENCOUNTER SYMPTOMS: GENERAL WELL-BEING: GOOD

## 2021-03-17 NOTE — PROGRESS NOTES
Chief Complaint   Patient presents with   • Annual Wellness Visit       HPI:  Rebecca is a 75 y.o. here for Medicare Annual Wellness Visit    Patient Active Problem List    Diagnosis Date Noted   • Recurrent UTI 02/09/2021   • Controlled diabetes mellitus type 2 with complications (HCC) 12/01/2020   • Essential hypertension 12/01/2020   • Mixed hyperlipidemia 12/01/2020   • Hypothyroidism due to acquired atrophy of thyroid 12/01/2020   • Gastroesophageal reflux disease without esophagitis 12/01/2020   • Hiatal hernia 11/09/2020   • History of basal cell carcinoma (BCC) 04/04/2019       Current Outpatient Medications   Medication Sig Dispense Refill   • Ferrous Sulfate (IRON PO) Take  by mouth every day.     • estradiol (ESTRACE) 0.1 MG/GM vaginal cream Insert 1 g into the vagina every 72 hours. 42.5 g 3   • losartan (COZAAR) 25 MG Tab Take 1 Tab by mouth every bedtime. Take with losartan 50 mg for total daily dose of 75 mg. 90 Tab 3   • losartan (COZAAR) 50 MG Tab Take 1 Tab by mouth every morning. Take with losartan 25 mg for total daily dose of 75 mg. 90 Tab 3   • omeprazole (PRILOSEC) 20 MG delayed-release capsule Take 1 Cap by mouth 2 times a day. 180 Cap 3   • atorvastatin (LIPITOR) 40 MG Tab Take 1 Tab by mouth every day. 90 Tab 3   • levothyroxine (SYNTHROID) 75 MCG Tab Take 1 Tab by mouth Every morning on an empty stomach. 90 Tab 3   • metFORMIN ER (GLUCOPHAGE XR) 500 MG TABLET SR 24 HR Take 1 Tab by mouth 2 times a day. 180 Tab 3   • metoprolol (LOPRESSOR) 100 MG Tab Take 1 Tab by mouth 2 times a day. 180 Tab 3     No current facility-administered medications for this visit.        Patient is taking medications as noted in medication list.  Current supplements as per medication list.     Allergies: Lisinopril and Warfarin    Current social contact/activities: None at this time due to covid      Is patient current with immunizations? Yes.    She  reports that she has never smoked. She has never used smokeless  tobacco. She reports previous alcohol use. She reports that she does not use drugs.  Counseling given: Yes      DPA/Advanced directive: Patient does not have an Advanced Directive.  A packet and workshop information was given on Advanced Directives.    ROS:    Gait: Uses no assistive device   Ostomy: No   Other tubes: No   Amputations: No   Chronic oxygen use No   Last eye exam 1 week ago [place] [facility]   Wears hearing aids: No   : Reports urinary leakage during the last 6 months that has somewhat interfered with their daily activities or sleep.    Screening:    Depression Screening    Little interest or pleasure in doing things?  0 - not at all  Feeling down, depressed, or hopeless? 0 - not at all  Patient Health Questionnaire Score: 0    If depressive symptoms identified deferred to follow up visit unless specifically addressed in assessment and plan.    Interpretation of PHQ-9 Total Score   Score Severity   1-4 No Depression   5-9 Mild Depression   10-14 Moderate Depression   15-19 Moderately Severe Depression   20-27 Severe Depression    Screening for Cognitive Impairment    Three Minute Recall (river, ishan, finger)  3/3    Jimmy clock face with all 12 numbers and set the hands to show 10 past 11.  Yes    If cognitive concerns identified, deferred for follow up unless specifically addressed in assessment and plan.    Fall Risk Assessment    Has the patient had two or more falls in the last year or any fall with injury in the last year?  No  If fall risk identified, deferred for follow up unless specifically addressed in assessment and plan.    Safety Assessment    Throw rugs on floor.  Yes  Handrails on all stairs.  Yes  Good lighting in all hallways.  Yes  Difficulty hearing.  Yes  Patient counseled about all safety risks that were identified.    Functional Assessment ADLs    Are there any barriers preventing you from cooking for yourself or meeting nutritional needs?  No.    Are there any barriers  preventing you from driving safely or obtaining transportation?  No.    Are there any barriers preventing you from using a telephone or calling for help?  No.    Are there any barriers preventing you from shopping?  No.    Are there any barriers preventing you from taking care of your own finances?  No.    Are there any barriers preventing you from managing your medications?  No.    Are there any barriers preventing you from showering, bathing or dressing yourself?  No.    Are you currently engaging in any exercise or physical activity?  Yes.  Treadmill 2 miles per day - down to 1 mile now since last vaccine     What is your perception of your health?  Good.    Health Maintenance Summary                COLONOSCOPY Overdue 10/18/1993     IMM ZOSTER VACCINES Overdue 12/15/2009      Done 10/20/2009 Imm Admin: Zoster Vaccine Live (ZVL) (Zostavax) - HISTORICAL DATA    A1C SCREENING Next Due 8/4/2021      Done 2/4/2021 HEMOGLOBIN A1C     Patient has more history with this topic...    FASTING LIPID PROFILE Next Due 10/21/2021      Done 10/21/2020 Ext Proc: LIPID PROFILE    SERUM CREATININE Next Due 10/21/2021      Done 10/21/2020 Ext Proc: CREATININE    MAMMOGRAM Next Due 1/19/2022      Done 1/19/2021 MA-SCREENING MAMMO BILAT W/TOMOSYNTHESIS W/CAD    URINE ACR / MICROALBUMIN Next Due 2/4/2022      Done 2/4/2021 MICROALBUMIN CREAT RATIO URINE    RETINAL SCREENING Next Due 3/4/2022      Done 3/4/2021 REFERRAL FOR RETINAL SCREENING EXAM    DIABETES MONOFILAMENT / LE EXAM Next Due 3/17/2022      Done 3/17/2021 AMB DIABETIC MONOFILAMENT LOWER EXTREMITY EXAM    BONE DENSITY Next Due 2/24/2026      Done 2/24/2021 DS-BONE DENSITY STUDY (DEXA)     Patient has more history with this topic...    IMM DTaP/Tdap/Td Vaccine Next Due 10/13/2028      Done 10/13/2018 Imm Admin: Tdap Vaccine     Patient has more history with this topic...          Patient Care Team:  Hamida Hooper M.D. as PCP - General (Family Medicine)    Social History  "    Tobacco Use   • Smoking status: Never Smoker   • Smokeless tobacco: Never Used   Substance Use Topics   • Alcohol use: Not Currently     Comment: every couple of years 1 drink    • Drug use: Never     Family History   Problem Relation Age of Onset   • Cancer Father    • Cancer Sister         renal   • Diabetes Neg Hx      She  has no past medical history on file.   Past Surgical History:   Procedure Laterality Date   • ABDOMINAL HYSTERECTOMY TOTAL     • CHOLECYSTECTOMY     • LUMPECTOMY Right     benign     Exam:     /84 (BP Location: Left arm, Patient Position: Sitting, BP Cuff Size: Adult)   Pulse 82   Temp 36.1 °C (97 °F) (Temporal)   Resp 16   Ht 1.626 m (5' 4\")   Wt 74.7 kg (164 lb 9.6 oz)   SpO2 98%  Body mass index is 28.25 kg/m².    Hearing good.    Dentition good  Alert, oriented in no acute distress.  Eye contact is good, speech goal directed, affect calm  Diabetic foot exam: No lesions or calluses noted. 2+ pedal pulses. Sensation intact with 10 out of 10 on monofilament test.    Assessment and Plan. The following treatment and monitoring plan is recommended:      1. Encounter for Medicare annual wellness exam  Rebecca is a pleasant 75-year-old woman here today for her Medicare wellness visit.  Her only concern is dysuria which is discussed below.    2. Controlled type 2 diabetes mellitus with complication, without long-term current use of insulin (HCC)  This is a chronic condition, controlled.  Her last hemoglobin A1c in February, 2021 was at goal at 7.3%.  Based on age and comorbidities our goal is under 7.5%.  Diabetic foot exam was performed in the office today and normal.  She is up-to-date with all of her diabetic screening otherwise.  -Continue metformin  mg twice daily    3. Essential hypertension  This is a chronic condition, controlled.  Blood pressure is at goal in our office today.  She is tolerating her medications without any side effects.  -Continue losartan 50 mg every " morning and 25 mg every evening  -Continue metoprolol 100 mg twice daily    4. Gastroesophageal reflux disease without esophagitis  5. Hiatal hernia  This is a chronic condition, controlled.  She is of a history of a hiatal hernia with associated acid reflux.  She is on omeprazole twice daily reports as long as she stays on that medication it controls her symptoms well.  -Continue omeprazole 20 mg twice daily    6. History of basal cell carcinoma (BCC)  This is a chronic condition.  She is followed with dermatology regularly who is managing this.  She reports that she recently saw him about a week ago with 3 lesions were removed by them.  She will follow up as directed.    7. Hypothyroidism due to acquired atrophy of thyroid  This is a chronic condition, controlled.  Has a history of hypothyroidism and is on levothyroxine.  Her TSH in October, 2020 was within normal limits indicating her dosing is appropriate.  -Continue levothyroxine 75 mcg daily    8. Mixed hyperlipidemia  This is a chronic condition, controlled.  She is on atorvastatin for primary prevention and tolerating it well.  Her last lipid panel in October, 2020 was within normal limits.  -Continue atorvastatin 40 mg daily    9. Recurrent UTI  This is a chronic condition.  She is on vaginal estrogen to help prevent UTIs.  She is been using it twice a week consistently but still reports that she is getting some episodes of dysuria especially after voiding and will linger for few minutes until it resolves.  There is no urgency, frequency or vaginal discharge.  We will go ahead and get a urinalysis to evaluate for UTI.  - URINALYSIS,CULTURE IF INDICATED; Future  -If no UTI, return for an office visit for pelvic exam    Services suggested: No services needed at this time  Health Care Screening recommendations as per orders if indicated.  Referrals offered: PT/OT/Nutrition counseling/Behavioral Health/Smoking cessation as per orders if indicated.    Discussion  today about general wellness and lifestyle habits:    · Prevent falls and reduce trip hazards; Cautioned about securing or removing rugs.  · Have a working fire alarm and carbon monoxide detector;   · Engage in regular physical activity and social activities       Follow-up: Return in about 6 months (around 9/17/2021) for f/u DM, get A1c.

## 2021-03-19 ENCOUNTER — HOSPITAL ENCOUNTER (OUTPATIENT)
Dept: LAB | Facility: MEDICAL CENTER | Age: 76
End: 2021-03-19
Attending: FAMILY MEDICINE
Payer: MEDICARE

## 2021-03-19 DIAGNOSIS — N39.0 RECURRENT UTI: ICD-10-CM

## 2021-03-19 LAB
APPEARANCE UR: ABNORMAL
BACTERIA #/AREA URNS HPF: NEGATIVE /HPF
BILIRUB UR QL STRIP.AUTO: NEGATIVE
COLOR UR: YELLOW
EPI CELLS #/AREA URNS HPF: ABNORMAL /HPF
GLUCOSE UR STRIP.AUTO-MCNC: NEGATIVE MG/DL
HYALINE CASTS #/AREA URNS LPF: ABNORMAL /LPF
KETONES UR STRIP.AUTO-MCNC: NEGATIVE MG/DL
LEUKOCYTE ESTERASE UR QL STRIP.AUTO: NEGATIVE
MICRO URNS: ABNORMAL
NITRITE UR QL STRIP.AUTO: NEGATIVE
PH UR STRIP.AUTO: 6.5 [PH] (ref 5–8)
PROT UR QL STRIP: NEGATIVE MG/DL
RBC # URNS HPF: ABNORMAL /HPF
RBC UR QL AUTO: NEGATIVE
SP GR UR STRIP.AUTO: 1.02
UROBILINOGEN UR STRIP.AUTO-MCNC: 0.2 MG/DL
WBC #/AREA URNS HPF: ABNORMAL /HPF

## 2021-03-19 PROCEDURE — 81001 URINALYSIS AUTO W/SCOPE: CPT

## 2021-03-23 DIAGNOSIS — R31.21 ASYMPTOMATIC MICROSCOPIC HEMATURIA: ICD-10-CM

## 2021-04-05 ENCOUNTER — HOSPITAL ENCOUNTER (OUTPATIENT)
Dept: LAB | Facility: MEDICAL CENTER | Age: 76
End: 2021-04-05
Attending: FAMILY MEDICINE
Payer: MEDICARE

## 2021-04-05 DIAGNOSIS — R31.21 ASYMPTOMATIC MICROSCOPIC HEMATURIA: ICD-10-CM

## 2021-04-05 LAB — CREAT SERPL-MCNC: 0.74 MG/DL (ref 0.5–1.4)

## 2021-04-05 PROCEDURE — 82565 ASSAY OF CREATININE: CPT

## 2021-04-05 PROCEDURE — 36415 COLL VENOUS BLD VENIPUNCTURE: CPT

## 2021-04-07 ENCOUNTER — HOSPITAL ENCOUNTER (OUTPATIENT)
Dept: RADIOLOGY | Facility: MEDICAL CENTER | Age: 76
End: 2021-04-07
Attending: FAMILY MEDICINE
Payer: MEDICARE

## 2021-04-07 DIAGNOSIS — R31.21 ASYMPTOMATIC MICROSCOPIC HEMATURIA: ICD-10-CM

## 2021-04-07 PROCEDURE — 700117 HCHG RX CONTRAST REV CODE 255: Performed by: FAMILY MEDICINE

## 2021-04-07 PROCEDURE — 74178 CT ABD&PLV WO CNTR FLWD CNTR: CPT

## 2021-04-07 RX ADMIN — IOHEXOL 100 ML: 350 INJECTION, SOLUTION INTRAVENOUS at 11:15

## 2021-04-13 ENCOUNTER — HOSPITAL ENCOUNTER (OUTPATIENT)
Facility: MEDICAL CENTER | Age: 76
End: 2021-04-13
Attending: PHYSICIAN ASSISTANT
Payer: MEDICARE

## 2021-04-13 LAB
APPEARANCE UR: CLEAR
BILIRUB UR QL STRIP.AUTO: NEGATIVE
COLOR UR: YELLOW
GLUCOSE UR STRIP.AUTO-MCNC: NEGATIVE MG/DL
KETONES UR STRIP.AUTO-MCNC: NEGATIVE MG/DL
LEUKOCYTE ESTERASE UR QL STRIP.AUTO: NEGATIVE
MICRO URNS: NORMAL
NITRITE UR QL STRIP.AUTO: NEGATIVE
PH UR STRIP.AUTO: 7 [PH] (ref 5–8)
PROT UR QL STRIP: NEGATIVE MG/DL
RBC UR QL AUTO: NEGATIVE
SP GR UR STRIP.AUTO: 1.01
UROBILINOGEN UR STRIP.AUTO-MCNC: 0.2 MG/DL

## 2021-04-13 PROCEDURE — 87086 URINE CULTURE/COLONY COUNT: CPT

## 2021-04-13 PROCEDURE — 81003 URINALYSIS AUTO W/O SCOPE: CPT

## 2021-04-17 LAB
BACTERIA UR CULT: NORMAL
SIGNIFICANT IND 70042: NORMAL
SITE SITE: NORMAL
SOURCE SOURCE: NORMAL

## 2021-04-30 ENCOUNTER — PRE-ADMISSION TESTING (OUTPATIENT)
Dept: ADMISSIONS | Facility: MEDICAL CENTER | Age: 76
End: 2021-04-30
Attending: UROLOGY
Payer: MEDICARE

## 2021-04-30 DIAGNOSIS — Z01.812 PRE-OPERATIVE LABORATORY EXAMINATION: ICD-10-CM

## 2021-04-30 DIAGNOSIS — Z01.810 PRE-OPERATIVE CARDIOVASCULAR EXAMINATION: ICD-10-CM

## 2021-04-30 RX ORDER — MAGNESIUM OXIDE 400 MG/1
400 TABLET ORAL 2 TIMES DAILY
COMMUNITY
End: 2024-01-11

## 2021-05-24 ENCOUNTER — PRE-ADMISSION TESTING (OUTPATIENT)
Dept: ADMISSIONS | Facility: MEDICAL CENTER | Age: 76
End: 2021-05-24
Attending: UROLOGY
Payer: MEDICARE

## 2021-05-24 DIAGNOSIS — Z01.812 PRE-OPERATIVE LABORATORY EXAMINATION: ICD-10-CM

## 2021-05-24 DIAGNOSIS — Z01.810 PRE-OPERATIVE CARDIOVASCULAR EXAMINATION: ICD-10-CM

## 2021-05-24 LAB
ANION GAP SERPL CALC-SCNC: 10 MMOL/L (ref 7–16)
APPEARANCE UR: CLEAR
BASOPHILS # BLD AUTO: 0 % (ref 0–1.8)
BASOPHILS # BLD: 0 K/UL (ref 0–0.12)
BILIRUB UR QL STRIP.AUTO: NEGATIVE
BUN SERPL-MCNC: 18 MG/DL (ref 8–22)
CALCIUM SERPL-MCNC: 9.3 MG/DL (ref 8.5–10.5)
CHLORIDE SERPL-SCNC: 98 MMOL/L (ref 96–112)
CO2 SERPL-SCNC: 24 MMOL/L (ref 20–33)
COLOR UR: YELLOW
CREAT SERPL-MCNC: 0.8 MG/DL (ref 0.5–1.4)
EKG IMPRESSION: NORMAL
EOSINOPHIL # BLD AUTO: 0 K/UL (ref 0–0.51)
EOSINOPHIL NFR BLD: 0 % (ref 0–6.9)
ERYTHROCYTE [DISTWIDTH] IN BLOOD BY AUTOMATED COUNT: 46.2 FL (ref 35.9–50)
GLUCOSE SERPL-MCNC: 98 MG/DL (ref 65–99)
GLUCOSE UR STRIP.AUTO-MCNC: NEGATIVE MG/DL
HCT VFR BLD AUTO: 39.1 % (ref 37–47)
HGB BLD-MCNC: 12.9 G/DL (ref 12–16)
INR PPP: 0.97 (ref 0.87–1.13)
KETONES UR STRIP.AUTO-MCNC: NEGATIVE MG/DL
LEUKOCYTE ESTERASE UR QL STRIP.AUTO: NEGATIVE
LYMPHOCYTES # BLD AUTO: 1.88 K/UL (ref 1–4.8)
LYMPHOCYTES NFR BLD: 39.1 % (ref 22–41)
MANUAL DIFF BLD: NORMAL
MCH RBC QN AUTO: 30.4 PG (ref 27–33)
MCHC RBC AUTO-ENTMCNC: 33 G/DL (ref 33.6–35)
MCV RBC AUTO: 92 FL (ref 81.4–97.8)
MICRO URNS: NORMAL
MONOCYTES # BLD AUTO: 0 K/UL (ref 0–0.85)
MONOCYTES NFR BLD AUTO: 0 % (ref 0–13.4)
MORPHOLOGY BLD-IMP: NORMAL
NEUTROPHILS # BLD AUTO: 2.92 K/UL (ref 2–7.15)
NEUTROPHILS NFR BLD: 57.4 % (ref 44–72)
NEUTS BAND NFR BLD MANUAL: 3.5 % (ref 0–10)
NITRITE UR QL STRIP.AUTO: NEGATIVE
NRBC # BLD AUTO: 0 K/UL
NRBC BLD-RTO: 0 /100 WBC
PH UR STRIP.AUTO: 7 [PH] (ref 5–8)
PLATELET # BLD AUTO: 252 K/UL (ref 164–446)
PLATELET BLD QL SMEAR: NORMAL
PMV BLD AUTO: 9.6 FL (ref 9–12.9)
POTASSIUM SERPL-SCNC: 4.6 MMOL/L (ref 3.6–5.5)
PROT UR QL STRIP: NEGATIVE MG/DL
PROTHROMBIN TIME: 13.2 SEC (ref 12–14.6)
RBC # BLD AUTO: 4.25 M/UL (ref 4.2–5.4)
RBC BLD AUTO: NORMAL
RBC UR QL AUTO: NEGATIVE
SARS-COV-2 RNA RESP QL NAA+PROBE: NOTDETECTED
SODIUM SERPL-SCNC: 132 MMOL/L (ref 135–145)
SP GR UR STRIP.AUTO: 1.01
SPECIMEN SOURCE: NORMAL
UROBILINOGEN UR STRIP.AUTO-MCNC: 0.2 MG/DL
WBC # BLD AUTO: 4.8 K/UL (ref 4.8–10.8)

## 2021-05-24 PROCEDURE — 80048 BASIC METABOLIC PNL TOTAL CA: CPT

## 2021-05-24 PROCEDURE — 81003 URINALYSIS AUTO W/O SCOPE: CPT

## 2021-05-24 PROCEDURE — 87086 URINE CULTURE/COLONY COUNT: CPT

## 2021-05-24 PROCEDURE — 93005 ELECTROCARDIOGRAM TRACING: CPT

## 2021-05-24 PROCEDURE — 36415 COLL VENOUS BLD VENIPUNCTURE: CPT

## 2021-05-24 PROCEDURE — C9803 HOPD COVID-19 SPEC COLLECT: HCPCS

## 2021-05-24 PROCEDURE — 85027 COMPLETE CBC AUTOMATED: CPT

## 2021-05-24 PROCEDURE — U0005 INFEC AGEN DETEC AMPLI PROBE: HCPCS

## 2021-05-24 PROCEDURE — 85610 PROTHROMBIN TIME: CPT

## 2021-05-24 PROCEDURE — U0003 INFECTIOUS AGENT DETECTION BY NUCLEIC ACID (DNA OR RNA); SEVERE ACUTE RESPIRATORY SYNDROME CORONAVIRUS 2 (SARS-COV-2) (CORONAVIRUS DISEASE [COVID-19]), AMPLIFIED PROBE TECHNIQUE, MAKING USE OF HIGH THROUGHPUT TECHNOLOGIES AS DESCRIBED BY CMS-2020-01-R: HCPCS

## 2021-05-24 PROCEDURE — 85007 BL SMEAR W/DIFF WBC COUNT: CPT

## 2021-05-24 PROCEDURE — 93010 ELECTROCARDIOGRAM REPORT: CPT | Performed by: INTERNAL MEDICINE

## 2021-05-25 ENCOUNTER — APPOINTMENT (OUTPATIENT)
Dept: ADMISSIONS | Facility: MEDICAL CENTER | Age: 76
End: 2021-05-25
Attending: UROLOGY
Payer: MEDICARE

## 2021-05-27 LAB
BACTERIA UR CULT: NORMAL
SIGNIFICANT IND 70042: NORMAL
SITE SITE: NORMAL
SOURCE SOURCE: NORMAL

## 2021-05-28 ENCOUNTER — HOSPITAL ENCOUNTER (OUTPATIENT)
Facility: MEDICAL CENTER | Age: 76
End: 2021-05-28
Attending: UROLOGY | Admitting: UROLOGY
Payer: MEDICARE

## 2021-05-28 ENCOUNTER — ANESTHESIA (OUTPATIENT)
Dept: SURGERY | Facility: MEDICAL CENTER | Age: 76
End: 2021-05-28
Payer: MEDICARE

## 2021-05-28 ENCOUNTER — ANESTHESIA EVENT (OUTPATIENT)
Dept: SURGERY | Facility: MEDICAL CENTER | Age: 76
End: 2021-05-28
Payer: MEDICARE

## 2021-05-28 VITALS
WEIGHT: 174.16 LBS | DIASTOLIC BLOOD PRESSURE: 64 MMHG | HEART RATE: 76 BPM | SYSTOLIC BLOOD PRESSURE: 129 MMHG | BODY MASS INDEX: 29.73 KG/M2 | OXYGEN SATURATION: 96 % | TEMPERATURE: 96.9 F | RESPIRATION RATE: 16 BRPM | HEIGHT: 64 IN

## 2021-05-28 DIAGNOSIS — N32.89 MASS OF URINARY BLADDER: Primary | ICD-10-CM

## 2021-05-28 LAB
GLUCOSE BLD-MCNC: 152 MG/DL (ref 65–99)
PATHOLOGY CONSULT NOTE: NORMAL

## 2021-05-28 PROCEDURE — A9270 NON-COVERED ITEM OR SERVICE: HCPCS | Performed by: UROLOGY

## 2021-05-28 PROCEDURE — 700102 HCHG RX REV CODE 250 W/ 637 OVERRIDE(OP): Performed by: ANESTHESIOLOGY

## 2021-05-28 PROCEDURE — 700101 HCHG RX REV CODE 250: Performed by: UROLOGY

## 2021-05-28 PROCEDURE — 700111 HCHG RX REV CODE 636 W/ 250 OVERRIDE (IP): Performed by: ANESTHESIOLOGY

## 2021-05-28 PROCEDURE — A9270 NON-COVERED ITEM OR SERVICE: HCPCS | Performed by: ANESTHESIOLOGY

## 2021-05-28 PROCEDURE — 160046 HCHG PACU - 1ST 60 MINS PHASE II: Performed by: UROLOGY

## 2021-05-28 PROCEDURE — 160025 RECOVERY II MINUTES (STATS): Performed by: UROLOGY

## 2021-05-28 PROCEDURE — 700105 HCHG RX REV CODE 258: Performed by: UROLOGY

## 2021-05-28 PROCEDURE — 160035 HCHG PACU - 1ST 60 MINS PHASE I: Performed by: UROLOGY

## 2021-05-28 PROCEDURE — 160002 HCHG RECOVERY MINUTES (STAT): Performed by: UROLOGY

## 2021-05-28 PROCEDURE — 160047 HCHG PACU  - EA ADDL 30 MINS PHASE II: Performed by: UROLOGY

## 2021-05-28 PROCEDURE — 700101 HCHG RX REV CODE 250: Performed by: ANESTHESIOLOGY

## 2021-05-28 PROCEDURE — 160009 HCHG ANES TIME/MIN: Performed by: UROLOGY

## 2021-05-28 PROCEDURE — 82962 GLUCOSE BLOOD TEST: CPT

## 2021-05-28 PROCEDURE — 88307 TISSUE EXAM BY PATHOLOGIST: CPT

## 2021-05-28 PROCEDURE — 160028 HCHG SURGERY MINUTES - 1ST 30 MINS LEVEL 3: Performed by: UROLOGY

## 2021-05-28 PROCEDURE — 160048 HCHG OR STATISTICAL LEVEL 1-5: Performed by: UROLOGY

## 2021-05-28 RX ORDER — ONDANSETRON 2 MG/ML
INJECTION INTRAMUSCULAR; INTRAVENOUS PRN
Status: DISCONTINUED | OUTPATIENT
Start: 2021-05-28 | End: 2021-05-28 | Stop reason: SURG

## 2021-05-28 RX ORDER — SODIUM CHLORIDE, SODIUM LACTATE, POTASSIUM CHLORIDE, CALCIUM CHLORIDE 600; 310; 30; 20 MG/100ML; MG/100ML; MG/100ML; MG/100ML
INJECTION, SOLUTION INTRAVENOUS CONTINUOUS
Status: DISCONTINUED | OUTPATIENT
Start: 2021-05-28 | End: 2021-05-28 | Stop reason: HOSPADM

## 2021-05-28 RX ORDER — DOCUSATE SODIUM 100 MG/1
100 CAPSULE, LIQUID FILLED ORAL 2 TIMES DAILY
Qty: 30 CAPSULE | Refills: 0 | COMMUNITY
End: 2022-10-07

## 2021-05-28 RX ORDER — LIDOCAINE HYDROCHLORIDE 20 MG/ML
INJECTION, SOLUTION EPIDURAL; INFILTRATION; INTRACAUDAL; PERINEURAL PRN
Status: DISCONTINUED | OUTPATIENT
Start: 2021-05-28 | End: 2021-05-28 | Stop reason: SURG

## 2021-05-28 RX ORDER — MIDAZOLAM HYDROCHLORIDE 1 MG/ML
INJECTION INTRAMUSCULAR; INTRAVENOUS PRN
Status: DISCONTINUED | OUTPATIENT
Start: 2021-05-28 | End: 2021-05-28 | Stop reason: SURG

## 2021-05-28 RX ORDER — CEFAZOLIN SODIUM 1 G/3ML
INJECTION, POWDER, FOR SOLUTION INTRAMUSCULAR; INTRAVENOUS PRN
Status: DISCONTINUED | OUTPATIENT
Start: 2021-05-28 | End: 2021-05-28 | Stop reason: SURG

## 2021-05-28 RX ORDER — HALOPERIDOL 5 MG/ML
1 INJECTION INTRAMUSCULAR
Status: DISCONTINUED | OUTPATIENT
Start: 2021-05-28 | End: 2021-05-28 | Stop reason: HOSPADM

## 2021-05-28 RX ORDER — DIPHENHYDRAMINE HYDROCHLORIDE 50 MG/ML
12.5 INJECTION INTRAMUSCULAR; INTRAVENOUS
Status: DISCONTINUED | OUTPATIENT
Start: 2021-05-28 | End: 2021-05-28 | Stop reason: HOSPADM

## 2021-05-28 RX ORDER — ONDANSETRON 2 MG/ML
4 INJECTION INTRAMUSCULAR; INTRAVENOUS
Status: DISCONTINUED | OUTPATIENT
Start: 2021-05-28 | End: 2021-05-28 | Stop reason: HOSPADM

## 2021-05-28 RX ORDER — OXYCODONE HYDROCHLORIDE 5 MG/1
5-10 TABLET ORAL EVERY 6 HOURS PRN
Qty: 5 TABLET | Refills: 0 | Status: SHIPPED | OUTPATIENT
Start: 2021-05-28 | End: 2021-05-31

## 2021-05-28 RX ORDER — METOCLOPRAMIDE HYDROCHLORIDE 5 MG/ML
INJECTION INTRAMUSCULAR; INTRAVENOUS PRN
Status: DISCONTINUED | OUTPATIENT
Start: 2021-05-28 | End: 2021-05-28 | Stop reason: SURG

## 2021-05-28 RX ORDER — POLYETHYLENE GLYCOL 3350 17 G/17G
17 POWDER, FOR SOLUTION ORAL DAILY
Qty: 14 EACH | Refills: 0 | COMMUNITY
End: 2021-09-21

## 2021-05-28 RX ADMIN — CEFAZOLIN 2 G: 330 INJECTION, POWDER, FOR SOLUTION INTRAMUSCULAR; INTRAVENOUS at 12:15

## 2021-05-28 RX ADMIN — SODIUM CHLORIDE, POTASSIUM CHLORIDE, SODIUM LACTATE AND CALCIUM CHLORIDE: 600; 310; 30; 20 INJECTION, SOLUTION INTRAVENOUS at 12:17

## 2021-05-28 RX ADMIN — SODIUM CHLORIDE, POTASSIUM CHLORIDE, SODIUM LACTATE AND CALCIUM CHLORIDE: 600; 310; 30; 20 INJECTION, SOLUTION INTRAVENOUS at 10:34

## 2021-05-28 RX ADMIN — POVIDONE IODINE 15 ML: 100 SOLUTION TOPICAL at 10:34

## 2021-05-28 RX ADMIN — MIDAZOLAM HYDROCHLORIDE 1 MG: 1 INJECTION, SOLUTION INTRAMUSCULAR; INTRAVENOUS at 12:15

## 2021-05-28 RX ADMIN — METOCLOPRAMIDE 20 MG: 5 INJECTION, SOLUTION INTRAMUSCULAR; INTRAVENOUS at 12:28

## 2021-05-28 RX ADMIN — HYDROCODONE BITARTRATE AND ACETAMINOPHEN 7.5 MG: 7.5; 325 SOLUTION ORAL at 12:59

## 2021-05-28 RX ADMIN — ONDANSETRON 4 MG: 2 INJECTION INTRAMUSCULAR; INTRAVENOUS at 12:32

## 2021-05-28 RX ADMIN — LIDOCAINE HYDROCHLORIDE 100 MG: 20 INJECTION, SOLUTION EPIDURAL; INFILTRATION; INTRACAUDAL at 12:20

## 2021-05-28 RX ADMIN — PROPOFOL 200 MG: 10 INJECTION, EMULSION INTRAVENOUS at 12:20

## 2021-05-28 RX ADMIN — FENTANYL CITRATE 25 MCG: 50 INJECTION, SOLUTION INTRAMUSCULAR; INTRAVENOUS at 13:18

## 2021-05-28 ASSESSMENT — PAIN DESCRIPTION - PAIN TYPE
TYPE: SURGICAL PAIN

## 2021-05-28 NOTE — ANESTHESIA TIME REPORT
Anesthesia Start and Stop Event Times     Date Time Event    5/28/2021 1158 Ready for Procedure     1215 Anesthesia Start     1250 Anesthesia Stop        Responsible Staff  05/28/21    Name Role Begin End    Sujit Nathan M.D. Anesth 1215 1250        Preop Diagnosis (Free Text):  Pre-op Diagnosis     DIAGNOSIS MASS OF URINARY BLADDER        Preop Diagnosis (Codes):    Post op Diagnosis  Mass of urinary bladder      Premium Reason  Non-Premium    Comments:

## 2021-05-28 NOTE — OR NURSING
Pt arrived in Phase 2 at 1341, ambulated to recliner chair, VSS, complains of tolerable 1-2 low pelvis pain, denies nausea or SOB, personal belongings and sister at bedside, pt has ambulated to BR, able to void, states urine was clear yellow, d/c instructions and RX reviewed with pt and sister, they verbalzied understanding of instructions, PIV removed, tip intact, discharged via wheelchair to home with sister at 1450.

## 2021-05-28 NOTE — DISCHARGE INSTRUCTIONS
ACTIVITY: Rest and take it easy for the first 24 hours.  A responsible adult is recommended to remain with you during that time.  It is normal to feel sleepy.  We encourage you to not do anything that requires balance, judgment or coordination.    MILD FLU-LIKE SYMPTOMS ARE NORMAL. YOU MAY EXPERIENCE GENERALIZED MUSCLE ACHES, THROAT IRRITATION, HEADACHE AND/OR SOME NAUSEA.    FOR 24 HOURS DO NOT:  Drive, operate machinery or run household appliances.  Drink beer or alcoholic beverages.   Make important decisions or sign legal documents.    SPECIAL INSTRUCTIONS:       DR. VARGAS' DISCHARGE INSTRUCTIONS FOLLOWING    TRANSURETHRAL RESECTION OF BLADDER TUMOR (TURBT)     DIET:  You can resume your regular diet. We encourage you to eat well-balanced and nutritious meals.      ACTIVITY:  Please restrain from strenuous activity or heavy lifting (more than 10 pounds) for two weeks following your TUR procedure.  Please walk daily as much as tolerated, making exercise a part of your daily life. Do not drive while using narcotics for pain control if you are using them.  Please avoid excessive straining with defecation and use stool softeners as directed to prevent constipation!     WOUND CARE:  You have no dressing or wound to manage.     CATHETER CARE:  You may go home with a urethral catheter (“philip). The purpose of this catheter is to make sure your bladder is continuously drained and does not become too distended as it heals over the next couple of days. Take care of the philip as you were shown in the recovery area.     MEDICATIONS:  1. Please use plain Tylenol or Advil (Motrin, Ibuprofen, etc) for pain and stool softeners (Miralax and Colace) as directed.   2. If you take aspirin, plavix, coumadin or other blood thinners, please do not take for 2 days following your surgery.    FOLLOW-UP:  We will call you to schedule your follow up appointment in 2 weeks for follow up. If you have not heard from us in 1-2 business  days, please call [phone] to schedule your follow-up appointment. You may also contact this number if you have questions or concerns that can be answered by Dr. Arteaga’ staff.     WARNING SIGNS:  Fever greater than 101 degrees Fahrenheit, chills, nausea or vomiting, Large amount of clots in urine that make it difficult to urinate or for urine to drain from philip, increasing pain, or abdominal swelling. If you are experiencing these symptoms, call the Urology Clinic or go to your local PCP or emergency room.    It is normal to see blood in your urine for up to 2 weeks even from surgery. The urine may clear up entirely, and then turn bloody again a few days later depending on your activity level; do not be alarmed. However, if you experience severe pain or tenderness, have a lot of increased bleeding, or find that you are unable to urinate because of large clots, please notify your doctor immediately     MEDICAL HELP DURING NORMAL BUSINESS HOURS:  Between the hours of 8 AM and 5 PM, please call [phone] to speak with Dr. Dave Arteaga’ staff.     MEDICAL HELP AFTER HOURS:  If you have a serious emergency such as chest pain, shortness of breath, relentless pain you should call [phone]. For other urgent problems after hours you may contact the urology physician on call by phoning the [phone]. You may also visit the Emergency room at local hospital for help.     For non-emergent problems such as prescription refills or routine questions, please do your best to contact us during normal business hours. This after-hours number should be used for urgent or emergent questions only.       Dave Arteaga M.D.   [address] [facility] [place] [address]   [phone]         DIET: To avoid nausea, slowly advance diet as tolerated, avoiding spicy or greasy foods for the first day.  Add more substantial food to your diet according to your physician's instructions.  Babies can be fed formula or breast milk as soon as they are  hungry.  INCREASE FLUIDS AND FIBER TO AVOID CONSTIPATION.    SURGICAL DRESSING/BATHING: See special instructions    FOLLOW-UP APPOINTMENT:  A follow-up appointment should be arranged with your doctor in 1-2 weeks; call to schedule.    You should CALL YOUR PHYSICIAN if you develop:  Fever greater than 101 degrees F.  Pain not relieved by medication, or persistent nausea or vomiting.  Excessive bleeding (blood soaking through dressing) or unexpected drainage from the wound.  Extreme redness or swelling around the incision site, drainage of pus or foul smelling drainage.  Inability to urinate or empty your bladder within 8 hours.  Problems with breathing or chest pain.    You should call 911 if you develop problems with breathing or chest pain.  If you are unable to contact your doctor or surgical center, you should go to the nearest emergency room or urgent care center.  Physician's telephone #: Dr. Arteaga [phone]    If any questions arise, call your doctor.  If your doctor is not available, please feel free to call the Surgical Center at [phone]. The Contact Center is open Monday through Friday 7AM to 5PM and may speak to a nurse at [phone], or toll free at [phone].     A registered nurse may call you a few days after your surgery to see how you are doing after your procedure.    MEDICATIONS: Resume taking daily medication.  Take prescribed pain medication with food.  If no medication is prescribed, you may take non-aspirin pain medication if needed.  PAIN MEDICATION CAN BE VERY CONSTIPATING.  Take a stool softener or laxative such as senokot, pericolace, or milk of magnesia if needed.    Prescription given for oxycodone 5 mg.  Last pain medication hycet 7.5 mg given at 1 pm.    If your physician has prescribed pain medication that includes Acetaminophen (Tylenol), do not take additional Acetaminophen (Tylenol) while taking the prescribed medication.    Depression / Suicide Risk    As you  are discharged from this [facility] facility, it is important to learn how to keep safe from harming yourself.    Recognize the warning signs:  · Abrupt changes in personality, positive or negative- including increase in energy   · Giving away possessions  · Change in eating patterns- significant weight changes-  positive or negative  · Change in sleeping patterns- unable to sleep or sleeping all the time   · Unwillingness or inability to communicate  · Depression  · Unusual sadness, discouragement and loneliness  · Talk of wanting to die  · Neglect of personal appearance   · Rebelliousness- reckless behavior  · Withdrawal from people/activities they love  · Confusion- inability to concentrate     If you or a loved one observes any of these behaviors or has concerns about self-harm, here's what you can do:  · Talk about it- your feelings and reasons for harming yourself  · Remove any means that you might use to hurt yourself (examples: pills, rope, extension cords, firearm)  · Get professional help from the community (Mental Health, Substance Abuse, psychological counseling)  · Do not be alone:Call your Safe Contact- someone whom you trust who will be there for you.  · Call your local CRISIS HOTLINE 992-8107 or 065-275-5938  · Call your local Children's Mobile Crisis Response Team [place] (401) 576-2355 or www.[employer]  · Call the toll free National Suicide Prevention Hotlines   · National Suicide Prevention Lifeline 318-528-ZCCR (3461)  · National Hope Line Network 800-SUICIDE (734-6885)

## 2021-05-28 NOTE — OP REPORT
Urologic Surgery Operative Report     Pre-operative Diagnosis: 1. Bladder lesion  2. no history of smoking   Post-operative Diagnosis: Same as above   Procedure: 1. Transurethral section of bladder tumor <2cm   Attending: Dave Arteaga M.D.    Assistant: Circulator: Jessie Valles R.N.  Scrub Person: Nidhi Agosto   Anesthesia:   Anesthesiologist: Sujit Nathan M.D.   Estimated Blood Loss: Minimal   IV fluids <500 cc crystalloid   Specimens: 1. Bladder mass/nodule chips sent for surgical pathology   Drains: 1. None   Complications: None   Condition: Stable, procedure well tolerated    Disposition:  1. PACU, discharge home after recovery,   2. Call with pathology results, f/u 2-4 wks to discuss pathology   Findings: 1. Odd submucosal lesion/mound on posterior bladder wall. This was resected in a5-6 small swipes and about 1-1.5cm in size. It was quite prominent and a bit rubbery in texture with yellow like inner tissue (had resemblance of prostate tissue..)     INDICATIONS FOR PROCEDURE:  Kristin Barrios is a very pleasant 75-year-old female who initially presented with microscopic hematuria and referred for a CT scan showing 8 mm nodule of her bladder.  On cystoscopic exam in clinic she was found to have a submucosal type nodule or tumor, likely benign and was thus scheduled for TURBT for pathologic diagnosis and clinical staging. Risks discussed, but not limited to, were infection, sepsis, bleeding, bladder injury, need for secondary procedure, inability to resect all of tumor, injury to ureters, need for philip post op, possible admission post operatively, and the cardiovascular and pulmonary complications of anesthesia/surgery including DVT, Mi, PE, and death     PROCEDURE IN DETAIL:   After informed consent was obtained, the patient was taken to the operating room and given ancef for preoperative antibiotics.  After satisfactory induction of general anesthesia, she was then placed in the lithotomy  position after a time out was called and prepped and draped in the normal sterile fashion.  Cystoscopy was initially performed using 30 degree lens which revealed, no trabeculations, orthotopic ureteral orifices and a bladder tumor located at posterior bladder wall, as described above.     I used the 26F continuous flow resectoscope to proceed with excision of his tumor(s) using BIpolar cautery. The lesion was resected down to muscular layer focusing on complete resection of the visualized tumor.  After resection, the bladder fragments were irrigated out with bladder tumor specimens brought out and sent for surgical pathology.    Hemostasis was assured using coagulation/fulguration over the course of the base of tumor, we also fulgarated a rim of tissue circumferentially to help eliminate any potentially residual tumor. At the end of the case all sponge, lap, and needle counts were correct.        Dave Arteaga MD  [facility] [address]  [place] [address]  [phone]

## 2021-05-28 NOTE — OR NURSING
1245: Pt arrived from OR, handoff received from anesthesiologist and RN. Pt breathing even and unlabored.     1300: Patient medicated with oral pain meds per anesthesia orders.     1302: Call made to patient's sister to update patient status.     1338: handoff to LATOYA Quintana in phase 2.

## 2021-05-28 NOTE — ANESTHESIA POSTPROCEDURE EVALUATION
Patient: Kristin Barrios    Procedure Summary     Date: 05/28/21 Room / Location: [place] [address] / SURGERY [facility]    Anesthesia Start: 1215 Anesthesia Stop: 1250    Procedure: TURBT (TRANSURETHRAL RESECTION OF BLADDER TUMOR) - BIPOLAR. (Bladder) Diagnosis: (DIAGNOSIS MASS OF URINARY BLADDER)    Surgeons: Dave Arteaga M.D. Responsible Provider: Sujit Nathan M.D.    Anesthesia Type: general ASA Status: 3          Final Anesthesia Type: general  Last vitals  BP   Blood Pressure : 129/64    Temp   36.1 °C (96.9 °F)    Pulse   76   Resp   16    SpO2   96 %      Anesthesia Post Evaluation    Patient location during evaluation: PACU  Patient participation: complete - patient participated  Level of consciousness: awake and alert    Airway patency: patent  Anesthetic complications: no  Cardiovascular status: hemodynamically stable  Respiratory status: acceptable  Hydration status: euvolemic    PONV: none          There were no known complications for this encounter.     Nurse Pain Score: 1 (NPRS)

## 2021-05-28 NOTE — ANESTHESIA PREPROCEDURE EVALUATION
Relevant Problems   NEURO   (positive) History of basal cell carcinoma (BCC)      CARDIAC   (positive) Essential hypertension      GI   (positive) Gastroesophageal reflux disease without esophagitis   (positive) Hiatal hernia      ENDO   (positive) Controlled diabetes mellitus type 2 with complications (HCC)   (positive) Hypothyroidism due to acquired atrophy of thyroid       Physical Exam    Airway   Mallampati: II  TM distance: >3 FB  Neck ROM: full       Cardiovascular - normal exam  Rhythm: regular  Rate: normal  (-) murmur     Dental - normal exam           Pulmonary - normal exam  Breath sounds clear to auscultation     Abdominal    Neurological - normal exam                 Anesthesia Plan    ASA 3       Plan - general       Airway plan will be LMA          Induction: intravenous      Pertinent diagnostic labs and testing reviewed    Informed Consent:    Anesthetic plan and risks discussed with patient.

## 2021-06-14 ENCOUNTER — HOSPITAL ENCOUNTER (OUTPATIENT)
Facility: MEDICAL CENTER | Age: 76
End: 2021-06-14
Attending: UROLOGY
Payer: MEDICARE

## 2021-06-14 PROCEDURE — 87086 URINE CULTURE/COLONY COUNT: CPT

## 2021-06-17 LAB
BACTERIA UR CULT: NORMAL
SIGNIFICANT IND 70042: NORMAL
SITE SITE: NORMAL
SOURCE SOURCE: NORMAL

## 2021-06-24 ENCOUNTER — OFFICE VISIT (OUTPATIENT)
Dept: MEDICAL GROUP | Facility: PHYSICIAN GROUP | Age: 76
End: 2021-06-24
Payer: MEDICARE

## 2021-06-24 VITALS
BODY MASS INDEX: 29.81 KG/M2 | HEART RATE: 74 BPM | OXYGEN SATURATION: 97 % | WEIGHT: 174.6 LBS | TEMPERATURE: 97.9 F | RESPIRATION RATE: 16 BRPM | HEIGHT: 64 IN | SYSTOLIC BLOOD PRESSURE: 128 MMHG | DIASTOLIC BLOOD PRESSURE: 72 MMHG

## 2021-06-24 DIAGNOSIS — J30.9 ALLERGIC CONJUNCTIVITIS AND RHINITIS, BILATERAL: ICD-10-CM

## 2021-06-24 DIAGNOSIS — H10.13 ALLERGIC CONJUNCTIVITIS AND RHINITIS, BILATERAL: ICD-10-CM

## 2021-06-24 PROBLEM — H57.9 ITCHY EYES: Status: ACTIVE | Noted: 2021-06-24

## 2021-06-24 PROBLEM — H57.10 EYE PAIN: Status: ACTIVE | Noted: 2021-06-24

## 2021-06-24 PROCEDURE — 99213 OFFICE O/P EST LOW 20 MIN: CPT | Performed by: FAMILY MEDICINE

## 2021-06-24 RX ORDER — OLOPATADINE HYDROCHLORIDE 2 MG/ML
1 SOLUTION/ DROPS OPHTHALMIC DAILY
Qty: 2.5 ML | Refills: 3 | Status: SHIPPED | OUTPATIENT
Start: 2021-06-24 | End: 2021-09-21

## 2021-06-24 RX ORDER — MIRABEGRON 50 MG/1
TABLET, FILM COATED, EXTENDED RELEASE ORAL
COMMUNITY
End: 2022-02-09

## 2021-06-24 NOTE — PROGRESS NOTES
Subjective:     CC: eye itching    HPI:   Kristin presents today with     Itchy eyes  She thinks she's had this issue before in 2018. She was given a steroid ointment. She is getting itching of both eyes. She rubs the eyes frequently which then makes it hurt. No watering of the eyes. Zyrtec helps with itching. She does have rhinorrhea, cough. This has been going on for the last 3-4 weeks.       Current Outpatient Medications Ordered in Epic   Medication Sig Dispense Refill   • Mirabegron ER (MYRBETRIQ) 50 MG TABLET SR 24 HR Myrbetriq 50 mg tablet,extended release   Take 1 tablet every day by oral route for 90 days.     • olopatadine HCl (GNP OLOPATADINE HCL) 0.2 % ophthalmic solution Administer 1 Drop into both eyes every day. 2.5 mL 3   • polyethylene glycol/lytes (MIRALAX) 17 g Pack Take 1 Packet by mouth every day. Please take to prevent constipation following your procedure.  Hold if loose stools 14 Each 0   • docusate sodium (COLACE) 100 MG Cap Take 1 capsule by mouth 2 times a day. 30 capsule 0   • magnesium oxide (MAG-OX) 400 MG Tab tablet Take 400 mg by mouth 2 times a day.     • NON SPECIFIED Take 8 Tablets by mouth every day. Greens  gummies     • Ferrous Sulfate (IRON PO) Take 1 tablet by mouth every day. OTC     • estradiol (ESTRACE) 0.1 MG/GM vaginal cream Insert 1 g into the vagina every 72 hours. 42.5 g 3   • losartan (COZAAR) 25 MG Tab Take 1 Tab by mouth every bedtime. Take with losartan 50 mg for total daily dose of 75 mg. (Patient taking differently: Take 25 mg by mouth at bedtime.) 90 Tab 3   • losartan (COZAAR) 50 MG Tab Take 1 Tab by mouth every morning. Take with losartan 25 mg for total daily dose of 75 mg. (Patient taking differently: Take 50 mg by mouth every morning.) 90 Tab 3   • omeprazole (PRILOSEC) 20 MG delayed-release capsule Take 1 Cap by mouth 2 times a day. 180 Cap 3   • atorvastatin (LIPITOR) 40 MG Tab Take 1 Tab by mouth every day. 90 Tab 3   • levothyroxine (SYNTHROID) 75 MCG Tab  "Take 1 Tab by mouth Every morning on an empty stomach. 90 Tab 3   • metFORMIN ER (GLUCOPHAGE XR) 500 MG TABLET SR 24 HR Take 1 Tab by mouth 2 times a day. 180 Tab 3   • metoprolol (LOPRESSOR) 100 MG Tab Take 1 Tab by mouth 2 times a day. 180 Tab 3     No current Epic-ordered facility-administered medications on file.       Health Maintenance: Completed    ROS:  Pulm: no sob  CV: no chest pain    Objective:     Exam:  /72 (BP Location: Right arm, Patient Position: Sitting, BP Cuff Size: Adult)   Pulse 74   Temp 36.6 °C (97.9 °F) (Temporal)   Resp 16   Ht 1.626 m (5' 4\")   Wt 79.2 kg (174 lb 9.6 oz)   SpO2 97%   BMI 29.97 kg/m²  Body mass index is 29.97 kg/m².    Gen: Alert and oriented, No apparent distress.  ENT: Sclera anicteric without conjunctival erythema. Mild reddening of lower eyelids.  Neck: Neck is supple without lymphadenopathy.  Lungs: Normal effort, CTA bilaterally, no wheezes, rhonchi, or rales  CV: Regular rate and rhythm. No murmurs, rubs, or gallops.  Ext: No clubbing, cyanosis, edema.    Assessment & Plan:     75 y.o. female with the following -     1. Allergic conjunctivitis and rhinitis, bilateral  This is an acute condition. For last 3-4 weeks she has had itching of both eyes and ends up rubbing her eyes very frequently. This then makes the eyes hurt. There is no watering she does have associated rhinorrhea and cough. She does note that Zyrtec does help with the itching somewhat. I suspect she is allergic conjunctivitis and rhinitis. She is new to [place] as there is likely something she is allergic to here that she was not aware of. Eyes appear normal except for mild reddening of the under eyelids but she was rubbing her eyes a lot during our visit today.  -Zyrtec 10 mg daily  -Olopatadine 0.2% eyedrops daily    I spent a total of 22 minutes with record review, exam, communication with the patient, and documentation of this encounter.      Return if symptoms worsen or fail to " improve.    Please note that this dictation was created using voice recognition software. I have made every reasonable attempt to correct obvious errors, but I expect that there are errors of grammar and possibly content that I did not discover before finalizing the note.

## 2021-06-24 NOTE — PATIENT INSTRUCTIONS
For your eyes:  - I think it's allergies  - Take 1 zyrtec every day (nighttime is sometimes better)  - Use the eye drops once a day as long as the zyrtec isn't enough    You'll only do this for your allergy season

## 2021-07-29 ENCOUNTER — OFFICE VISIT (OUTPATIENT)
Dept: MEDICAL GROUP | Facility: PHYSICIAN GROUP | Age: 76
End: 2021-07-29
Payer: MEDICARE

## 2021-07-29 VITALS
WEIGHT: 178.2 LBS | DIASTOLIC BLOOD PRESSURE: 78 MMHG | BODY MASS INDEX: 30.42 KG/M2 | HEIGHT: 64 IN | RESPIRATION RATE: 16 BRPM | SYSTOLIC BLOOD PRESSURE: 122 MMHG | OXYGEN SATURATION: 97 % | TEMPERATURE: 97.1 F | HEART RATE: 82 BPM

## 2021-07-29 DIAGNOSIS — H01.9 DERMATITIS OF EYELID OF LEFT EYE, UNSPECIFIED TYPE: ICD-10-CM

## 2021-07-29 PROCEDURE — 99213 OFFICE O/P EST LOW 20 MIN: CPT | Performed by: FAMILY MEDICINE

## 2021-07-29 RX ORDER — TRIAMCINOLONE ACETONIDE 0.25 MG/G
CREAM TOPICAL
Qty: 15 G | Refills: 0 | Status: SHIPPED | OUTPATIENT
Start: 2021-07-29 | End: 2021-09-21

## 2021-07-29 NOTE — ASSESSMENT & PLAN NOTE
This is an acute condition. She was having bilateral eye itching at her last appointment. I prescribed olopatadine eye drops and she is taking zyrtec with them daily. The right symptoms have resolved but the left has worsened. Getting red and swollen. She is getting vision changes in the left eye, blurry. The left eye worsened in the last week and the blurring has been for the last couple of days. Once she opens the eyelid, the blurred vision improves.

## 2021-07-29 NOTE — PROGRESS NOTES
Subjective:     CC: eye complaint    HPI:   Kristin presents today with     Itchy eyes  This is an acute condition. She was having bilateral eye itching at her last appointment. I prescribed olopatadine eye drops and she is taking zyrtec with them daily. The right symptoms have resolved but the left has worsened. Getting red and swollen. She is getting vision changes in the left eye, blurry. The left eye worsened in the last week and the blurring has been for the last couple of days. Once she opens the eyelid, the blurred vision improves.       Current Outpatient Medications Ordered in Epic   Medication Sig Dispense Refill   • triamcinolone acetonide (KENALOG) 0.025 % Cream Apply thin layer to affected area twice a day FOR NO MORE THAN 7 DAYS. 15 g 0   • Mirabegron ER (MYRBETRIQ) 50 MG TABLET SR 24 HR Myrbetriq 50 mg tablet,extended release   Take 1 tablet every day by oral route for 90 days.     • olopatadine HCl (GNP OLOPATADINE HCL) 0.2 % ophthalmic solution Administer 1 Drop into both eyes every day. 2.5 mL 3   • polyethylene glycol/lytes (MIRALAX) 17 g Pack Take 1 Packet by mouth every day. Please take to prevent constipation following your procedure.  Hold if loose stools 14 Each 0   • docusate sodium (COLACE) 100 MG Cap Take 1 capsule by mouth 2 times a day. 30 capsule 0   • magnesium oxide (MAG-OX) 400 MG Tab tablet Take 400 mg by mouth 2 times a day.     • NON SPECIFIED Take 8 Tablets by mouth every day. Greens  gummies     • Ferrous Sulfate (IRON PO) Take 1 tablet by mouth every day. OTC     • estradiol (ESTRACE) 0.1 MG/GM vaginal cream Insert 1 g into the vagina every 72 hours. 42.5 g 3   • losartan (COZAAR) 25 MG Tab Take 1 Tab by mouth every bedtime. Take with losartan 50 mg for total daily dose of 75 mg. (Patient taking differently: Take 25 mg by mouth at bedtime.) 90 Tab 3   • losartan (COZAAR) 50 MG Tab Take 1 Tab by mouth every morning. Take with losartan 25 mg for total daily dose of 75 mg. (Patient  "taking differently: Take 50 mg by mouth every morning.) 90 Tab 3   • omeprazole (PRILOSEC) 20 MG delayed-release capsule Take 1 Cap by mouth 2 times a day. 180 Cap 3   • atorvastatin (LIPITOR) 40 MG Tab Take 1 Tab by mouth every day. 90 Tab 3   • levothyroxine (SYNTHROID) 75 MCG Tab Take 1 Tab by mouth Every morning on an empty stomach. 90 Tab 3   • metFORMIN ER (GLUCOPHAGE XR) 500 MG TABLET SR 24 HR Take 1 Tab by mouth 2 times a day. 180 Tab 3   • metoprolol (LOPRESSOR) 100 MG Tab Take 1 Tab by mouth 2 times a day. 180 Tab 3     No current Epic-ordered facility-administered medications on file.       Health Maintenance: Completed    ROS:  Gen: no fevers/chills  Pulm: no sob  CV: no chest pain    Objective:     Exam:  /78 (BP Location: Right arm, Patient Position: Sitting, BP Cuff Size: Adult)   Pulse 82   Temp 36.2 °C (97.1 °F) (Temporal)   Resp 16   Ht 1.626 m (5' 4\")   Wt 80.8 kg (178 lb 3.2 oz)   SpO2 97%   BMI 30.59 kg/m²  Body mass index is 30.59 kg/m².    Gen: Alert and oriented, No apparent distress.  L Eye: No conjunctival erythema. Erythema of upper and lower eyelids with scale.  Neck: Neck is supple without lymphadenopathy.  Lungs: Normal effort, CTA bilaterally, no wheezes, rhonchi, or rales  CV: Regular rate and rhythm. No murmurs, rubs, or gallops.  Ext: No clubbing, cyanosis, edema.      Assessment & Plan:     75 y.o. female with the following -     1. Dermatitis of eyelid of left eye, unspecified type  This is an acute condition.  I saw her about a month ago with itching of bilateral eyes that I strongly suspected was allergic in origin.  She is been taking Zyrtec and elaborating eyedrops daily in the right eye symptoms completely resolved.  However, about a week ago she started evolving significant redness and swelling around the left eye.  She is to tell me that there is also associated blurred vision and it became very concerned about a potential acute narrow angle glaucoma but then " she clarified that if she fully opens her eyelids the blurred vision resolves.  No conjunctival erythema on exam so bacterial conjunctivitis seems less likely or any other conjunctivitis for that matter.  She has fairly significant erythema and scale of the upper and lower eyelids on the left side.  She reports this occurred in the past that required a steroid cream which resolved it.  I suspect she has dermatitis of her eyelids.  -Triamcinolone 0.025% cream twice daily x7 days  -Close follow-up within a week to verify resolution of symptoms    Return in about 1 week (around 8/5/2021) for f/u eye.    Please note that this dictation was created using voice recognition software. I have made every reasonable attempt to correct obvious errors, but I expect that there are errors of grammar and possibly content that I did not discover before finalizing the note.

## 2021-08-09 ENCOUNTER — OFFICE VISIT (OUTPATIENT)
Dept: MEDICAL GROUP | Facility: PHYSICIAN GROUP | Age: 76
End: 2021-08-09
Payer: MEDICARE

## 2021-08-09 VITALS
HEIGHT: 64 IN | DIASTOLIC BLOOD PRESSURE: 88 MMHG | WEIGHT: 179 LBS | TEMPERATURE: 98.7 F | HEART RATE: 73 BPM | SYSTOLIC BLOOD PRESSURE: 134 MMHG | RESPIRATION RATE: 16 BRPM | BODY MASS INDEX: 30.56 KG/M2 | OXYGEN SATURATION: 95 %

## 2021-08-09 DIAGNOSIS — H01.9 DERMATITIS OF EYELID OF LEFT EYE, UNSPECIFIED TYPE: Primary | ICD-10-CM

## 2021-08-09 DIAGNOSIS — E11.8 CONTROLLED TYPE 2 DIABETES MELLITUS WITH COMPLICATION, WITHOUT LONG-TERM CURRENT USE OF INSULIN (HCC): ICD-10-CM

## 2021-08-09 PROCEDURE — 99214 OFFICE O/P EST MOD 30 MIN: CPT | Performed by: FAMILY MEDICINE

## 2021-08-09 NOTE — PROGRESS NOTES
Subjective:     CC: f/u eye dermatitis    HPI:   Kristin presents today with     Itchy eyes  This is an acute condition.  When I saw her last week she been developing a lot of redness and itching around the left thigh for a week.  I suspected some form of dermatitis and prescribed steroid cream. She used it for 7 days. It had gotten better but then after stopping the cream for a couple of days, it came right back.       Current Outpatient Medications Ordered in Epic   Medication Sig Dispense Refill   • triamcinolone acetonide (KENALOG) 0.025 % Cream Apply thin layer to affected area twice a day FOR NO MORE THAN 7 DAYS. 15 g 0   • Mirabegron ER (MYRBETRIQ) 50 MG TABLET SR 24 HR Myrbetriq 50 mg tablet,extended release   Take 1 tablet every day by oral route for 90 days.     • olopatadine HCl (GNP OLOPATADINE HCL) 0.2 % ophthalmic solution Administer 1 Drop into both eyes every day. 2.5 mL 3   • polyethylene glycol/lytes (MIRALAX) 17 g Pack Take 1 Packet by mouth every day. Please take to prevent constipation following your procedure.  Hold if loose stools 14 Each 0   • docusate sodium (COLACE) 100 MG Cap Take 1 capsule by mouth 2 times a day. 30 capsule 0   • magnesium oxide (MAG-OX) 400 MG Tab tablet Take 400 mg by mouth 2 times a day.     • NON SPECIFIED Take 8 Tablets by mouth every day. Greens  gummies     • Ferrous Sulfate (IRON PO) Take 1 tablet by mouth every day. OTC     • estradiol (ESTRACE) 0.1 MG/GM vaginal cream Insert 1 g into the vagina every 72 hours. 42.5 g 3   • losartan (COZAAR) 25 MG Tab Take 1 Tab by mouth every bedtime. Take with losartan 50 mg for total daily dose of 75 mg. (Patient taking differently: Take 25 mg by mouth at bedtime.) 90 Tab 3   • losartan (COZAAR) 50 MG Tab Take 1 Tab by mouth every morning. Take with losartan 25 mg for total daily dose of 75 mg. (Patient taking differently: Take 50 mg by mouth every morning.) 90 Tab 3   • omeprazole (PRILOSEC) 20 MG delayed-release capsule Take 1  Cap by mouth 2 times a day. 180 Cap 3   • atorvastatin (LIPITOR) 40 MG Tab Take 1 Tab by mouth every day. 90 Tab 3   • levothyroxine (SYNTHROID) 75 MCG Tab Take 1 Tab by mouth Every morning on an empty stomach. 90 Tab 3   • metFORMIN ER (GLUCOPHAGE XR) 500 MG TABLET SR 24 HR Take 1 Tab by mouth 2 times a day. 180 Tab 3   • metoprolol (LOPRESSOR) 100 MG Tab Take 1 Tab by mouth 2 times a day. 180 Tab 3     No current Epic-ordered facility-administered medications on file.       Health Maintenance: Completed    ROS:  Gen: no fevers/chills  Pulm: no sob  CV: no chest pain    Objective:     Exam:  /88 (BP Location: Right arm, Patient Position: Sitting, BP Cuff Size: Adult)   Pulse 73   Temp 37.1 °C (98.7 °F) (Temporal)   Resp 16   Wt 81.2 kg (179 lb)   SpO2 95%   BMI 30.73 kg/m²  Body mass index is 30.73 kg/m².    Constitutional: Alert, no distress, well-groomed.  Skin: Warm, dry, good turgor, no rashes in visible areas.  Eye: Equal, round and reactive, conjunctiva clear. Left upper and lower eyelids red and edematous. No scale.  ENMT: Lips without lesions, good dentition, moist mucous membranes.  Neck: Trachea midline, no masses, no thyromegaly.  Respiratory: Unlabored respiratory effort, no cough.  MSK: Normal gait, moves all extremities.  Neuro: Grossly non-focal.   Psych: Alert and oriented x3, normal affect and mood.    Assessment & Plan:     75 y.o. female with the following -     1. Dermatitis of eyelid of left eye, unspecified type  This is an acute condition, no change.  At her last appointment I suspected that she was developing dermatitis of the left eye though unsure whether it was eczematous or allergic.  I prescribed 7 days of triamcinolone cream which was helping but after couple days of stopping the steroid the dermatitis worsened.  Further research on up-to-date indicates that we should stick with only low potency steroids but may require 2 to 4 weeks of treatments were can add 3 more weeks  of treatment and see how she does.  -Triamcinolone 0.025% cream twice daily for 2-3 weeks more    2. Controlled type 2 diabetes mellitus with complication, without long-term current use of insulin (HCC)  This is a chronic condition, stable.  She will be due to have her A1c checked prior to the next visit which has been ordered.  - HEMOGLOBIN A1C; Future    Return in about 3 weeks (around 8/30/2021) for f/u eye.    Please note that this dictation was created using voice recognition software. I have made every reasonable attempt to correct obvious errors, but I expect that there are errors of grammar and possibly content that I did not discover before finalizing the note.

## 2021-08-09 NOTE — ASSESSMENT & PLAN NOTE
This is an acute condition.  When I saw her last week she been developing a lot of redness and itching around the left thigh for a week.  I suspected some form of dermatitis and prescribed steroid cream. She used it for 7 days. It had gotten better but then after stopping the cream for a couple of days, it came right back.

## 2021-08-13 ENCOUNTER — OFFICE VISIT (OUTPATIENT)
Dept: MEDICAL GROUP | Facility: PHYSICIAN GROUP | Age: 76
End: 2021-08-13
Payer: MEDICARE

## 2021-08-13 VITALS
SYSTOLIC BLOOD PRESSURE: 140 MMHG | WEIGHT: 178.8 LBS | BODY MASS INDEX: 30.52 KG/M2 | HEART RATE: 80 BPM | TEMPERATURE: 97 F | OXYGEN SATURATION: 97 % | DIASTOLIC BLOOD PRESSURE: 92 MMHG | HEIGHT: 64 IN | RESPIRATION RATE: 16 BRPM

## 2021-08-13 DIAGNOSIS — M79.671 RIGHT FOOT PAIN: ICD-10-CM

## 2021-08-13 PROCEDURE — 99212 OFFICE O/P EST SF 10 MIN: CPT | Performed by: STUDENT IN AN ORGANIZED HEALTH CARE EDUCATION/TRAINING PROGRAM

## 2021-08-13 NOTE — PROGRESS NOTES
CC:  There were no encounter diagnoses.    HISTORY OF THE PRESENT ILLNESS: Patient is a 75 y.o. female. This pleasant patient is here today to discuss right foot pain. Her  PCP is Dr. Rufus KIRBY.    Elva Reynolds is here today with a concern about foot pain that began suddenly in her sleep last night.  The pain is on the dorsal side of her right foot, mid metatarsal, generalized over the fourth the fifth metatarsal area.  Without radiation.  Pain is very sudden last 5 to 7 seconds and feels stabbing.  She has never experienced this before.  There are no associated activities or trauma reported.  A careful review of the previous day's activities revealed nothing atypical.    No problem-specific Assessment & Plan notes found for this encounter.      Current Outpatient Medications Ordered in Epic   Medication Sig Dispense Refill   • triamcinolone acetonide (KENALOG) 0.025 % Cream Apply thin layer to affected area twice a day FOR NO MORE THAN 7 DAYS. 15 g 0   • Mirabegron ER (MYRBETRIQ) 50 MG TABLET SR 24 HR Myrbetriq 50 mg tablet,extended release   Take 1 tablet every day by oral route for 90 days.     • olopatadine HCl (GNP OLOPATADINE HCL) 0.2 % ophthalmic solution Administer 1 Drop into both eyes every day. 2.5 mL 3   • polyethylene glycol/lytes (MIRALAX) 17 g Pack Take 1 Packet by mouth every day. Please take to prevent constipation following your procedure.  Hold if loose stools 14 Each 0   • docusate sodium (COLACE) 100 MG Cap Take 1 capsule by mouth 2 times a day. 30 capsule 0   • magnesium oxide (MAG-OX) 400 MG Tab tablet Take 400 mg by mouth 2 times a day.     • NON SPECIFIED Take 8 Tablets by mouth every day. Greens  gummies     • Ferrous Sulfate (IRON PO) Take 1 tablet by mouth every day. OTC     • estradiol (ESTRACE) 0.1 MG/GM vaginal cream Insert 1 g into the vagina every 72 hours. 42.5 g 3   • losartan (COZAAR) 25 MG Tab Take 1 Tab by mouth every bedtime. Take with losartan 50 mg for total daily dose of 75 mg.  "(Patient taking differently: Take 25 mg by mouth at bedtime.) 90 Tab 3   • losartan (COZAAR) 50 MG Tab Take 1 Tab by mouth every morning. Take with losartan 25 mg for total daily dose of 75 mg. (Patient taking differently: Take 50 mg by mouth every morning.) 90 Tab 3   • omeprazole (PRILOSEC) 20 MG delayed-release capsule Take 1 Cap by mouth 2 times a day. 180 Cap 3   • atorvastatin (LIPITOR) 40 MG Tab Take 1 Tab by mouth every day. 90 Tab 3   • levothyroxine (SYNTHROID) 75 MCG Tab Take 1 Tab by mouth Every morning on an empty stomach. 90 Tab 3   • metFORMIN ER (GLUCOPHAGE XR) 500 MG TABLET SR 24 HR Take 1 Tab by mouth 2 times a day. 180 Tab 3   • metoprolol (LOPRESSOR) 100 MG Tab Take 1 Tab by mouth 2 times a day. 180 Tab 3     No current Epic-ordered facility-administered medications on file.     ROS:   Gen: no fevers/chills, no changes in weight  Pulm: no sob, no cough  CV: no chest pain, no palpitations  GI: no nausea/vomiting, no diarrhea  MSk: no myalgias  Skin: no rash  Neuro: no headaches, no numbness/tingling    Objective:     Exam: /92 (BP Location: Left arm, Patient Position: Sitting, BP Cuff Size: Large adult)   Pulse 80   Temp 36.1 °C (97 °F) (Temporal)   Resp 16   Ht 1.626 m (5' 4\")   Wt 81.1 kg (178 lb 12.8 oz)   SpO2 97%  Body mass index is 30.69 kg/m².    General: Normal appearing. No distress.  Pulmonary: Clear to ausculation.  Normal effort. No rales, ronchi, or wheezing.  Cardiovascular: Regular rate and rhythm without murmur.   Neurologic: Grossly nonfocal  Skin: Warm and dry.  No obvious lesions.  Musculoskeletal: Normal gait. No extremity cyanosis, clubbing, or edema.  Right foot: Careful inspection of entire right foot, ankle and calf reveal no abnormalities.  Her calfs are of equal diameter, 35-1/2 cm.  There is no erythema, swelling, surface imperfection, heat.  Palpation of the region associated with the pain does not reproduce.  Normal passive/active range of motion.  " Capillary refill is normal as are pedal pulses.  10 g monofilament test shows intact sensation.  Psych: Normal mood and affect. Alert and oriented x3. Judgment and insight is normal.    A chaperone was offered to the patient during today's exam. Patient declined chaperone.    Labs: No pertinent labs.    Assessment & Plan:   75 y.o. female with the following -    1.  Right foot pain.  -Right foot pain of unknown etiology.  Atypical presentation of Schafer neuroma versus spontaneous metatarsal fracture.  -I had a long discussion with the patient about diagnostics for this.  She wishes to pursue conservative care as this has been only 1 day of symptoms.  -Symptomatic relief with Tylenol up to 3 g/day.  -Ice and elevation if effective.  -Return if symptoms get worse or if they do not resolve by the end of next week.    Return if symptoms worsen or fail to improve.    Please note that this dictation was created using voice recognition software. I have made every reasonable attempt to correct obvious errors, but I expect that there are errors of grammar and possibly content that I did not discover before finalizing the note.    Bert Mckeon PA-C 8/13/2021

## 2021-08-18 ENCOUNTER — HOSPITAL ENCOUNTER (OUTPATIENT)
Dept: LAB | Facility: MEDICAL CENTER | Age: 76
End: 2021-08-18
Attending: FAMILY MEDICINE
Payer: MEDICARE

## 2021-08-18 DIAGNOSIS — E11.8 CONTROLLED TYPE 2 DIABETES MELLITUS WITH COMPLICATION, WITHOUT LONG-TERM CURRENT USE OF INSULIN (HCC): ICD-10-CM

## 2021-08-18 LAB
EST. AVERAGE GLUCOSE BLD GHB EST-MCNC: 151 MG/DL
HBA1C MFR BLD: 6.9 % (ref 4–5.6)

## 2021-08-18 PROCEDURE — 83036 HEMOGLOBIN GLYCOSYLATED A1C: CPT | Mod: GA

## 2021-08-18 PROCEDURE — 36415 COLL VENOUS BLD VENIPUNCTURE: CPT | Mod: GA

## 2021-08-23 ENCOUNTER — OFFICE VISIT (OUTPATIENT)
Dept: MEDICAL GROUP | Facility: PHYSICIAN GROUP | Age: 76
End: 2021-08-23
Payer: MEDICARE

## 2021-08-23 VITALS
HEIGHT: 64 IN | SYSTOLIC BLOOD PRESSURE: 134 MMHG | OXYGEN SATURATION: 96 % | HEART RATE: 84 BPM | TEMPERATURE: 97.3 F | WEIGHT: 177.4 LBS | BODY MASS INDEX: 30.29 KG/M2 | RESPIRATION RATE: 16 BRPM | DIASTOLIC BLOOD PRESSURE: 90 MMHG

## 2021-08-23 DIAGNOSIS — L30.8 OTHER ECZEMA: ICD-10-CM

## 2021-08-23 DIAGNOSIS — H01.9 DERMATITIS OF EYELID OF LEFT EYE, UNSPECIFIED TYPE: ICD-10-CM

## 2021-08-23 PROBLEM — L30.9 ECZEMA: Status: ACTIVE | Noted: 2021-08-23

## 2021-08-23 PROBLEM — H57.9 ITCHY EYES: Status: RESOLVED | Noted: 2021-06-24 | Resolved: 2021-08-23

## 2021-08-23 PROCEDURE — 99213 OFFICE O/P EST LOW 20 MIN: CPT | Performed by: FAMILY MEDICINE

## 2021-08-23 RX ORDER — TRIAMCINOLONE ACETONIDE 1 MG/G
CREAM TOPICAL
Qty: 28.4 G | Refills: 0 | Status: SHIPPED | OUTPATIENT
Start: 2021-08-23 | End: 2021-09-21

## 2021-08-23 NOTE — PROGRESS NOTES
Subjective:     CC: f/u eye    HPI:   Kristin presents today with     1. Dermatitis of eyelid of left eye, unspecified type  This is an acute condition.  She has now completed 3 weeks of topical steroid treatment for the dermatitis of her eyelid per up-to-date recommendations.  She reports an 85% improvement. It started flaring up again in the last couple of days.     2. Eczema  She is using the triamcinolone cream on her eyes 1-2 times per day. She has been doing that for the last 2 weeks.       Current Outpatient Medications Ordered in Epic   Medication Sig Dispense Refill   • triamcinolone acetonide (KENALOG) 0.1 % Cream Apply thin layer to elbows twice daily for NO MORE THAN 14 DAYS 28.4 g 0   • triamcinolone acetonide (KENALOG) 0.025 % Cream Apply thin layer to affected area twice a day FOR NO MORE THAN 7 DAYS. 15 g 0   • Mirabegron ER (MYRBETRIQ) 50 MG TABLET SR 24 HR Myrbetriq 50 mg tablet,extended release   Take 1 tablet every day by oral route for 90 days.     • olopatadine HCl (GNP OLOPATADINE HCL) 0.2 % ophthalmic solution Administer 1 Drop into both eyes every day. 2.5 mL 3   • polyethylene glycol/lytes (MIRALAX) 17 g Pack Take 1 Packet by mouth every day. Please take to prevent constipation following your procedure.  Hold if loose stools 14 Each 0   • docusate sodium (COLACE) 100 MG Cap Take 1 capsule by mouth 2 times a day. 30 capsule 0   • magnesium oxide (MAG-OX) 400 MG Tab tablet Take 400 mg by mouth 2 times a day.     • NON SPECIFIED Take 8 Tablets by mouth every day. Greens  gummies     • Ferrous Sulfate (IRON PO) Take 1 tablet by mouth every day. OTC     • estradiol (ESTRACE) 0.1 MG/GM vaginal cream Insert 1 g into the vagina every 72 hours. 42.5 g 3   • losartan (COZAAR) 25 MG Tab Take 1 Tab by mouth every bedtime. Take with losartan 50 mg for total daily dose of 75 mg. (Patient taking differently: Take 25 mg by mouth at bedtime.) 90 Tab 3   • losartan (COZAAR) 50 MG Tab Take 1 Tab by mouth every  "morning. Take with losartan 25 mg for total daily dose of 75 mg. (Patient taking differently: Take 50 mg by mouth every morning.) 90 Tab 3   • omeprazole (PRILOSEC) 20 MG delayed-release capsule Take 1 Cap by mouth 2 times a day. 180 Cap 3   • atorvastatin (LIPITOR) 40 MG Tab Take 1 Tab by mouth every day. 90 Tab 3   • levothyroxine (SYNTHROID) 75 MCG Tab Take 1 Tab by mouth Every morning on an empty stomach. 90 Tab 3   • metFORMIN ER (GLUCOPHAGE XR) 500 MG TABLET SR 24 HR Take 1 Tab by mouth 2 times a day. 180 Tab 3   • metoprolol (LOPRESSOR) 100 MG Tab Take 1 Tab by mouth 2 times a day. 180 Tab 3     No current Epic-ordered facility-administered medications on file.       Health Maintenance: Completed    ROS:  Gen: no fevers/chills  Pulm: no sob  CV: no chest pain    Objective:     Exam:  /90 (BP Location: Left arm, Patient Position: Sitting, BP Cuff Size: Adult)   Pulse 84   Temp 36.3 °C (97.3 °F) (Temporal)   Resp 16   Ht 1.626 m (5' 4\")   Wt 80.5 kg (177 lb 6.4 oz)   SpO2 96%   BMI 30.45 kg/m²  Body mass index is 30.45 kg/m².    Gen: Alert and oriented, No apparent distress.  Neck: Neck is supple without lymphadenopathy.  Lungs: Normal effort, CTA bilaterally, no wheezes, rhonchi, or rales  CV: Regular rate and rhythm. No murmurs, rubs, or gallops.  Ext: No clubbing, cyanosis, edema.    Assessment & Plan:     75 y.o. female with the following -     Problem List Items Addressed This Visit     Dermatitis of eyelid of left eye     This is an acute condition, improved.  She is now 3 weeks into a topical steroid treatment for the dermatitis of her left eyelid and reports approximately 85% improvement.  She will complete 1 more week for a total of 4 weeks.  She has an appoint with dermatology later this week and have encouraged her to discuss this with them as well.  -Continue triamcinolone 0.025% cream twice daily for a total of 4 weeks         Eczema     This is an acute condition.  She is also had " some eczema of her bilateral elbows.  She is been using the triamcinolone 0.025% cream but not noticing any improvement.  Therefore, we are going to increase the potency and she knows to only use the increased potency for the elbows.  -Triamcinolone 0.1% cream twice daily for 14 days on elbows               Return in about 1 week (around 8/30/2021) for f/u eye.    Please note that this dictation was created using voice recognition software. I have made every reasonable attempt to correct obvious errors, but I expect that there are errors of grammar and possibly content that I did not discover before finalizing the note.

## 2021-08-23 NOTE — ASSESSMENT & PLAN NOTE
This is an acute condition.  She is also had some eczema of her bilateral elbows.  She is been using the triamcinolone 0.025% cream but not noticing any improvement.  Therefore, we are going to increase the potency and she knows to only use the increased potency for the elbows.  -Triamcinolone 0.1% cream twice daily for 14 days on elbows

## 2021-08-23 NOTE — ASSESSMENT & PLAN NOTE
This is an acute condition, improved.  She is now 3 weeks into a topical steroid treatment for the dermatitis of her left eyelid and reports approximately 85% improvement.  She will complete 1 more week for a total of 4 weeks.  She has an appoint with dermatology later this week and have encouraged her to discuss this with them as well.  -Continue triamcinolone 0.025% cream twice daily for a total of 4 weeks

## 2021-09-21 ENCOUNTER — OFFICE VISIT (OUTPATIENT)
Dept: MEDICAL GROUP | Facility: PHYSICIAN GROUP | Age: 76
End: 2021-09-21
Payer: MEDICARE

## 2021-09-21 VITALS
BODY MASS INDEX: 30.42 KG/M2 | SYSTOLIC BLOOD PRESSURE: 128 MMHG | HEART RATE: 84 BPM | WEIGHT: 178.2 LBS | HEIGHT: 64 IN | RESPIRATION RATE: 18 BRPM | DIASTOLIC BLOOD PRESSURE: 88 MMHG | OXYGEN SATURATION: 97 % | TEMPERATURE: 98.4 F

## 2021-09-21 DIAGNOSIS — L30.8 OTHER ECZEMA: ICD-10-CM

## 2021-09-21 DIAGNOSIS — Z23 NEED FOR VACCINATION: ICD-10-CM

## 2021-09-21 DIAGNOSIS — I70.0 ATHEROSCLEROSIS OF AORTA (HCC): ICD-10-CM

## 2021-09-21 DIAGNOSIS — E03.4 HYPOTHYROIDISM DUE TO ACQUIRED ATROPHY OF THYROID: ICD-10-CM

## 2021-09-21 DIAGNOSIS — E11.8 CONTROLLED TYPE 2 DIABETES MELLITUS WITH COMPLICATION, WITHOUT LONG-TERM CURRENT USE OF INSULIN (HCC): Primary | ICD-10-CM

## 2021-09-21 DIAGNOSIS — I10 ESSENTIAL HYPERTENSION: ICD-10-CM

## 2021-09-21 PROBLEM — M79.671 RIGHT FOOT PAIN: Status: RESOLVED | Noted: 2021-08-13 | Resolved: 2021-09-21

## 2021-09-21 PROCEDURE — 90662 IIV NO PRSV INCREASED AG IM: CPT | Performed by: FAMILY MEDICINE

## 2021-09-21 PROCEDURE — G0008 ADMIN INFLUENZA VIRUS VAC: HCPCS | Performed by: FAMILY MEDICINE

## 2021-09-21 PROCEDURE — 99214 OFFICE O/P EST MOD 30 MIN: CPT | Mod: 25 | Performed by: FAMILY MEDICINE

## 2021-09-21 NOTE — ASSESSMENT & PLAN NOTE
This is a chronic condition, controlled.  Blood pressure is at goal under 140/90 in the office today.  We will continue the current regimen.  - losartan 50 mg qAM + 25 mg qHS  - metoprolol 100 mg bid

## 2021-09-21 NOTE — ASSESSMENT & PLAN NOTE
This is a chronic condition, controlled.  Hemoglobin A1c is under 7.0%.  She is up-to-date with all of her diabetic screenings, on an ARB for renal protection, and is on a statin for cardiovascular protection.  We will continue the current regimen.  - metformin  mg bid

## 2021-09-21 NOTE — PROGRESS NOTES
Subjective:     CC: f/u DM    HPI:   Kristin presents today with     Problem   Eczema    She has seen dermatology. They placed her on a different ointment specifically for eczema around her eyes. It has improved significantly. She is going to get patch testing to figure out what is causing her eczema.     Controlled Diabetes Mellitus Type 2 With Complications (Hcc)    This is a chronic condition.  Current medications:  Insulin: -  Biguanide: metformin  mg bid  GLP1-RA: -  SGLT-2i: -  DPP4-I: -  TZD: -  Shravan: -  Sulfonyluria: -    Last A1c: 6.9% (8/2021)  Last Microalb/Cr ratio: normal (2/2021)  Fasting sugars: n/a  Last diabetic foot exam: 3/2021  Last retinal eye exam: 3/2021  ACEi/ARB: losartan 25 mg  Statin: atorvastatin 40 mg  Aspirin: recommended - age >49 + HTN  Concomitant HTN: yes - at goal  Nightly foot checks: no       Essential Hypertension    This is a chronic condition.  Current Meds: losartan 25 mg qhs + 50 mg qAM, metoprolol 100 mg bid  Side effects: none  Home BP Log: none  Associated symptoms: no cp, no sob       Atherosclerosis of Aorta (Hcc)   Right Foot Pain (Resolved)       Current Outpatient Medications Ordered in Epic   Medication Sig Dispense Refill   • metFORMIN ER (GLUCOPHAGE XR) 500 MG TABLET SR 24 HR TAKE 1 TABLET BY MOUTH  TWICE DAILY 180 Tablet 3   • levothyroxine (SYNTHROID) 75 MCG Tab TAKE 1 TABLET BY MOUTH IN  THE MORNING ON AN EMPTY  STOMACH 90 Tablet 0   • metoprolol tartrate (LOPRESSOR) 100 MG Tab TAKE 1 TABLET BY MOUTH  TWICE DAILY 180 Tablet 3   • atorvastatin (LIPITOR) 40 MG Tab TAKE 1 TABLET BY MOUTH  DAILY 90 Tablet 3   • Mirabegron ER (MYRBETRIQ) 50 MG TABLET SR 24 HR Myrbetriq 50 mg tablet,extended release   Take 1 tablet every day by oral route for 90 days.     • docusate sodium (COLACE) 100 MG Cap Take 1 capsule by mouth 2 times a day. 30 capsule 0   • magnesium oxide (MAG-OX) 400 MG Tab tablet Take 400 mg by mouth 2 times a day.     • NON SPECIFIED Take 8 Tablets by  "mouth every day. Greens  gummies     • Ferrous Sulfate (IRON PO) Take 1 tablet by mouth every day. OTC     • estradiol (ESTRACE) 0.1 MG/GM vaginal cream Insert 1 g into the vagina every 72 hours. 42.5 g 3   • losartan (COZAAR) 25 MG Tab Take 1 Tab by mouth every bedtime. Take with losartan 50 mg for total daily dose of 75 mg. (Patient taking differently: Take 25 mg by mouth at bedtime.) 90 Tab 3   • losartan (COZAAR) 50 MG Tab Take 1 Tab by mouth every morning. Take with losartan 25 mg for total daily dose of 75 mg. (Patient taking differently: Take 50 mg by mouth every morning.) 90 Tab 3   • omeprazole (PRILOSEC) 20 MG delayed-release capsule Take 1 Cap by mouth 2 times a day. 180 Cap 3     No current Epic-ordered facility-administered medications on file.       Health Maintenance: Completed    ROS:  See HPI    Objective:     Exam:  /88 (BP Location: Left arm, Patient Position: Sitting, BP Cuff Size: Adult)   Pulse 84   Temp 36.9 °C (98.4 °F) (Temporal)   Resp 18   Ht 1.626 m (5' 4\")   Wt 80.8 kg (178 lb 3.2 oz)   SpO2 97%   BMI 30.59 kg/m²  Body mass index is 30.59 kg/m².    Gen: Alert and oriented, No apparent distress.  Neck: Neck is supple without lymphadenopathy.  Lungs: Normal effort, CTA bilaterally, no wheezes, rhonchi, or rales  CV: Regular rate and rhythm. No murmurs, rubs, or gallops.  Ext: No clubbing, cyanosis, edema.      Assessment & Plan:     75 y.o. female with the following -     Problem List Items Addressed This Visit     Controlled diabetes mellitus type 2 with complications (HCC)     This is a chronic condition, controlled.  Hemoglobin A1c is under 7.0%.  She is up-to-date with all of her diabetic screenings, on an ARB for renal protection, and is on a statin for cardiovascular protection.  We will continue the current regimen.  - metformin  mg bid         Relevant Orders    Lipid Profile    Essential hypertension     This is a chronic condition, controlled.  Blood pressure " is at goal under 140/90 in the office today.  We will continue the current regimen.  - losartan 50 mg qAM + 25 mg qHS  - metoprolol 100 mg bid         Hypothyroidism due to acquired atrophy of thyroid    Relevant Orders    TSH WITH REFLEX TO FT4    Atherosclerosis of aorta (HCC)     This is a chronic condition, stable. Aortic atherosclerosis noted on CT scan in 2020. She is already on a statin for appropriate treatment.  - atorvastatin 40 mg daily         Eczema     This is a chronic condition, improved. She has now seen dermatology who changed the topical treatment which has been much more effective. She will continue to follow with dermatology.           Other Visit Diagnoses     Need for vaccination        Relevant Orders    INFLUENZA VACCINE, HIGH DOSE (65+ ONLY) (Completed)        Return in about 6 months (around 3/21/2022) for f/u DM.    Please note that this dictation was created using voice recognition software. I have made every reasonable attempt to correct obvious errors, but I expect that there are errors of grammar and possibly content that I did not discover before finalizing the note.

## 2021-09-21 NOTE — LETTER
[facility]  Hamida Hooper M.D.  [address]  [place] [address]  Fax: [phone]   Authorization for Release/Disclosure of   Protected Health Information   Name: YUMIKO BARRIOS : 1945 SSN: [id number]   Address: [address] [place] [address]  [place] [address] Phone:    [phone] (home)    I authorize the entity listed below to release/disclose the PHI below to:   [facility]/Hamida Hooper M.D. and Hamida Hooper M.D.   Provider or Entity Name:  [facility]   Address   City, State, [facility]   Phone:      Fax:     Reason for request: continuity of care   Information to be released:    [  ] LAST COLONOSCOPY,  including any PATH REPORT and follow-up  [  ] LAST FIT/COLOGUARD RESULT [  ] LAST DEXA  [  ] LAST MAMMOGRAM  [  ] LAST PAP  [  ] LAST LABS [  ] RETINA EXAM REPORT  [  ] IMMUNIZATION RECORDS  [XX] Release all info      [  ] Check here and initial the line next to each item to release ALL health information INCLUDING  _____ Care and treatment for drug and / or alcohol abuse  _____ HIV testing, infection status, or AIDS  _____ Genetic Testing    DATES OF SERVICE OR TIME PERIOD TO BE DISCLOSED: _____________  I understand and acknowledge that:  * This Authorization may be revoked at any time by you in writing, except if your health information has already been used or disclosed.  * Your health information that will be used or disclosed as a result of you signing this authorization could be re-disclosed by the recipient. If this occurs, your re-disclosed health information may no longer be protected by State or Federal laws.  * You may refuse to sign this Authorization. Your refusal will not affect your ability to obtain treatment.  * This Authorization becomes effective upon signing and will  on (date) __________.      If no date is indicated, this Authorization will  one (1) year from the signature date.    Name: Yumiko Barrios    Signature:    Date:     9/21/2021       PLEASE FAX REQUESTED RECORDS BACK TO: [phone]

## 2021-09-21 NOTE — ASSESSMENT & PLAN NOTE
This is a chronic condition, stable. Aortic atherosclerosis noted on CT scan in 2020. She is already on a statin for appropriate treatment.  - atorvastatin 40 mg daily

## 2021-09-21 NOTE — ASSESSMENT & PLAN NOTE
This is a chronic condition, improved. She has now seen dermatology who changed the topical treatment which has been much more effective. She will continue to follow with dermatology.

## 2021-12-08 ENCOUNTER — TELEPHONE (OUTPATIENT)
Dept: MEDICAL GROUP | Facility: PHYSICIAN GROUP | Age: 76
End: 2021-12-08

## 2021-12-09 NOTE — TELEPHONE ENCOUNTER
Received refill request for the following medication:     -Levothyroxine 75mcg tablets     Last OV: 11/09/2021

## 2021-12-14 DIAGNOSIS — E03.4 HYPOTHYROIDISM DUE TO ACQUIRED ATROPHY OF THYROID: ICD-10-CM

## 2021-12-14 RX ORDER — LEVOTHYROXINE SODIUM 0.07 MG/1
75 TABLET ORAL
Qty: 90 TABLET | Refills: 0 | Status: SHIPPED | OUTPATIENT
Start: 2021-12-14 | End: 2022-02-08

## 2022-01-02 DIAGNOSIS — F41.9 ANXIETY: ICD-10-CM

## 2022-01-02 DIAGNOSIS — I10 ESSENTIAL HYPERTENSION: ICD-10-CM

## 2022-01-02 DIAGNOSIS — E03.4 HYPOTHYROIDISM DUE TO ACQUIRED ATROPHY OF THYROID: ICD-10-CM

## 2022-01-03 RX ORDER — TRIAZOLAM 0.12 MG/1
0.12 TABLET ORAL ONCE
Qty: 1 TABLET | OUTPATIENT
Start: 2022-01-03 | End: 2022-01-03

## 2022-01-03 RX ORDER — LOSARTAN POTASSIUM 25 MG/1
TABLET ORAL
Qty: 90 TABLET | Refills: 3 | Status: SHIPPED | OUTPATIENT
Start: 2022-01-03 | End: 2022-03-16

## 2022-01-03 RX ORDER — LEVOTHYROXINE SODIUM 0.07 MG/1
TABLET ORAL
Qty: 90 TABLET | Refills: 3 | OUTPATIENT
Start: 2022-01-03

## 2022-01-03 NOTE — TELEPHONE ENCOUNTER
Levothyroxine denied - has to complete labs first. Already informed of this via [employer] message she read. Please remind her.    Triazolam denied - controlled substances always require an appointment for refills.

## 2022-01-07 ENCOUNTER — HOSPITAL ENCOUNTER (OUTPATIENT)
Dept: LAB | Facility: MEDICAL CENTER | Age: 77
End: 2022-01-07
Attending: FAMILY MEDICINE
Payer: MEDICARE

## 2022-01-07 DIAGNOSIS — E03.4 HYPOTHYROIDISM DUE TO ACQUIRED ATROPHY OF THYROID: ICD-10-CM

## 2022-01-07 DIAGNOSIS — E11.8 CONTROLLED TYPE 2 DIABETES MELLITUS WITH COMPLICATION, WITHOUT LONG-TERM CURRENT USE OF INSULIN (HCC): ICD-10-CM

## 2022-01-07 LAB
CHOLEST SERPL-MCNC: 184 MG/DL (ref 100–199)
HDLC SERPL-MCNC: 37 MG/DL
LDLC SERPL CALC-MCNC: 78 MG/DL
TRIGL SERPL-MCNC: 346 MG/DL (ref 0–149)
TSH SERPL DL<=0.005 MIU/L-ACNC: 5.26 UIU/ML (ref 0.38–5.33)

## 2022-01-07 PROCEDURE — 80061 LIPID PANEL: CPT

## 2022-01-07 PROCEDURE — 84443 ASSAY THYROID STIM HORMONE: CPT

## 2022-01-07 PROCEDURE — 36415 COLL VENOUS BLD VENIPUNCTURE: CPT

## 2022-02-01 DIAGNOSIS — E11.8 CONTROLLED TYPE 2 DIABETES MELLITUS WITH COMPLICATION, WITHOUT LONG-TERM CURRENT USE OF INSULIN (HCC): ICD-10-CM

## 2022-02-07 DIAGNOSIS — E03.4 HYPOTHYROIDISM DUE TO ACQUIRED ATROPHY OF THYROID: ICD-10-CM

## 2022-02-08 RX ORDER — LEVOTHYROXINE SODIUM 0.07 MG/1
TABLET ORAL
Qty: 90 TABLET | Refills: 3 | Status: SHIPPED | OUTPATIENT
Start: 2022-02-08 | End: 2023-01-16

## 2022-02-09 ENCOUNTER — HOSPITAL ENCOUNTER (OUTPATIENT)
Dept: RADIOLOGY | Facility: MEDICAL CENTER | Age: 77
End: 2022-02-09
Attending: FAMILY MEDICINE
Payer: MEDICARE

## 2022-02-09 ENCOUNTER — OFFICE VISIT (OUTPATIENT)
Dept: MEDICAL GROUP | Facility: PHYSICIAN GROUP | Age: 77
End: 2022-02-09
Payer: MEDICARE

## 2022-02-09 VITALS
OXYGEN SATURATION: 97 % | HEIGHT: 64 IN | DIASTOLIC BLOOD PRESSURE: 76 MMHG | HEART RATE: 84 BPM | RESPIRATION RATE: 18 BRPM | BODY MASS INDEX: 31.41 KG/M2 | WEIGHT: 184 LBS | SYSTOLIC BLOOD PRESSURE: 118 MMHG | TEMPERATURE: 97.1 F

## 2022-02-09 DIAGNOSIS — M79.671 RIGHT FOOT PAIN: ICD-10-CM

## 2022-02-09 PROBLEM — M79.673 FOOT PAIN: Status: ACTIVE | Noted: 2021-08-13

## 2022-02-09 PROCEDURE — 73630 X-RAY EXAM OF FOOT: CPT | Mod: RT

## 2022-02-09 PROCEDURE — 99213 OFFICE O/P EST LOW 20 MIN: CPT | Performed by: FAMILY MEDICINE

## 2022-02-09 RX ORDER — NYSTATIN 100000 [USP'U]/G
1 POWDER TOPICAL 2 TIMES DAILY
Qty: 15 G | Refills: 1 | Status: SHIPPED | OUTPATIENT
Start: 2022-02-09 | End: 2022-12-08

## 2022-02-09 ASSESSMENT — ENCOUNTER SYMPTOMS
FEVER: 0
SHORTNESS OF BREATH: 0
CHILLS: 0

## 2022-02-09 NOTE — ASSESSMENT & PLAN NOTE
Acute, diagnosis uncertain, prognosis uncertain.  Approximately 1.5 months ago she fell.  She fell onto her knees and does not think she actually injured her foot but since then she has had sharp pain in the dorsum of the right foot especially over the MTPs.  On exam there is no bruising but there is some swelling over the MTPs on the dorsal aspect of the right foot.  We will get an x-ray to make sure there is no fracture that has been missed.  In the meantime, she can continue ice, elevation, and over-the-counter pain medicines as needed.

## 2022-02-09 NOTE — PROGRESS NOTES
"Subjective:     CC: foot pain    HPI:   Kristin presents today with    Problem   Foot Pain    Onset: 1.5 months  Location: right foot, dorsal MTPs, dorsal foot  Quality: sharp  Associated symptoms: no numbness/tingling  Trauma: fell on 1/26/2022, fell onto knees  Home treatments: elevation, ice         Health Maintenance: Completed    ROS:  Review of Systems   Constitutional: Negative for chills and fever.   Respiratory: Negative for shortness of breath.    Cardiovascular: Negative for chest pain.       Objective:     Exam:  /76 (BP Location: Left arm, Patient Position: Sitting, BP Cuff Size: Adult)   Pulse 84   Temp 36.2 °C (97.1 °F) (Temporal)   Resp 18   Ht 1.626 m (5' 4\")   Wt 83.5 kg (184 lb)   SpO2 97%   BMI 31.58 kg/m²  Body mass index is 31.58 kg/m².    Physical Exam  Constitutional:       Appearance: Normal appearance.   Cardiovascular:      Rate and Rhythm: Normal rate and regular rhythm.      Heart sounds: Normal heart sounds.   Pulmonary:      Effort: Pulmonary effort is normal.      Breath sounds: Normal breath sounds.   Musculoskeletal:      Cervical back: Normal range of motion and neck supple.        Feet:    Feet:      Comments: No TTP but pain over dorsal MTPs when squeezing MTPs together.  Neurological:      Mental Status: She is alert.       Assessment & Plan:     76 y.o. female with the following -     Problem List Items Addressed This Visit     Foot pain     Acute.  Approximately 1.5 months ago she fell.  She fell onto her knees and does not think she actually injured her foot but since then she has had sharp pain in the dorsum of the right foot especially over the MTPs.  On exam there is no bruising but there is some swelling over the MTPs on the dorsal aspect of the right foot.  We will get an x-ray to make sure there is no fracture that has been missed.  In the meantime, she can continue ice, elevation, and over-the-counter pain medicines as needed.         Relevant Orders    " DX-FOOT-COMPLETE 3+ RIGHT        Return if symptoms worsen or fail to improve.    Please note that this dictation was created using voice recognition software. I have made every reasonable attempt to correct obvious errors, but I expect that there are errors of grammar and possibly content that I did not discover before finalizing the note.

## 2022-02-10 ENCOUNTER — HOSPITAL ENCOUNTER (OUTPATIENT)
Dept: RADIOLOGY | Facility: MEDICAL CENTER | Age: 77
End: 2022-02-10
Attending: FAMILY MEDICINE
Payer: MEDICARE

## 2022-02-10 DIAGNOSIS — Z12.31 VISIT FOR SCREENING MAMMOGRAM: ICD-10-CM

## 2022-02-10 PROCEDURE — 77063 BREAST TOMOSYNTHESIS BI: CPT

## 2022-02-17 ENCOUNTER — HOSPITAL ENCOUNTER (OUTPATIENT)
Facility: MEDICAL CENTER | Age: 77
End: 2022-02-17
Attending: PHYSICIAN ASSISTANT
Payer: MEDICARE

## 2022-02-17 PROCEDURE — 87086 URINE CULTURE/COLONY COUNT: CPT

## 2022-02-20 LAB
BACTERIA UR CULT: NORMAL
SIGNIFICANT IND 70042: NORMAL
SITE SITE: NORMAL
SOURCE SOURCE: NORMAL

## 2022-03-09 ENCOUNTER — OFFICE VISIT (OUTPATIENT)
Dept: MEDICAL GROUP | Facility: PHYSICIAN GROUP | Age: 77
End: 2022-03-09
Payer: MEDICARE

## 2022-03-09 VITALS
RESPIRATION RATE: 16 BRPM | HEIGHT: 64 IN | DIASTOLIC BLOOD PRESSURE: 84 MMHG | WEIGHT: 182 LBS | HEART RATE: 79 BPM | BODY MASS INDEX: 31.07 KG/M2 | SYSTOLIC BLOOD PRESSURE: 156 MMHG | OXYGEN SATURATION: 98 % | TEMPERATURE: 97.4 F

## 2022-03-09 DIAGNOSIS — I10 ESSENTIAL HYPERTENSION: ICD-10-CM

## 2022-03-09 DIAGNOSIS — E11.8 CONTROLLED TYPE 2 DIABETES MELLITUS WITH COMPLICATION, WITHOUT LONG-TERM CURRENT USE OF INSULIN (HCC): ICD-10-CM

## 2022-03-09 DIAGNOSIS — I70.0 ATHEROSCLEROSIS OF AORTA (HCC): ICD-10-CM

## 2022-03-09 PROBLEM — N32.81 OVERACTIVE BLADDER: Status: ACTIVE | Noted: 2022-02-17

## 2022-03-09 PROCEDURE — 99213 OFFICE O/P EST LOW 20 MIN: CPT | Performed by: FAMILY MEDICINE

## 2022-03-09 ASSESSMENT — ENCOUNTER SYMPTOMS
SORE THROAT: 0
MYALGIAS: 0
SHORTNESS OF BREATH: 0
WEAKNESS: 0
BLURRED VISION: 0
COUGH: 0
DOUBLE VISION: 0
NAUSEA: 0
PALPITATIONS: 0
SENSORY CHANGE: 0
DIZZINESS: 0
TINGLING: 0
TREMORS: 0
HEADACHES: 0
CHILLS: 0
SPEECH CHANGE: 0
FOCAL WEAKNESS: 0
FEVER: 0
VOMITING: 0

## 2022-03-09 ASSESSMENT — PATIENT HEALTH QUESTIONNAIRE - PHQ9: CLINICAL INTERPRETATION OF PHQ2 SCORE: 0

## 2022-03-09 NOTE — PROGRESS NOTES
Subjective:     CC: EMS f/u    HPI:   Kristin presents today with       1) BP check, and sugar check  Yesterday 3/8 pt was driving home from [place]  About 1:15pm had lunch, [place] funny    Tingling on top of L hand and L side of tongue,    Called her sister, told her to call EMS  By the time EMS arrived no symptoms   was not taken to hospital  Sugar was 300a pprox per pt  Does not know BP at time of visit or sx    Repeat bp 156/84    No cp   No HA  No blurry vision  No tingling or numbness    Needs repeat a1c  Pt is not monitoring sugars at home      Only recent med change,  Pt stopped Myrbetriq     Problem   Overactive Bladder   Controlled Diabetes Mellitus Type 2 With Complications (Hcc)    This is a chronic condition.  Current medications:  Insulin: -  Biguanide: metformin  mg bid  GLP1-RA: -  SGLT-2i: -  DPP4-I: -  TZD: -  Shravan: -  Sulfonyluria: -    Last A1c: 6.9% (8/2021)  Last Microalb/Cr ratio: normal (2/2021)  Fasting sugars: n/a  Last diabetic foot exam: 3/2021  Last retinal eye exam: 3/2021  ACEi/ARB: losartan 25 mg  Statin: atorvastatin 40 mg  Aspirin: recommended - age >49 + HTN  Concomitant HTN: yes - at goal  Nightly foot checks: no       Essential Hypertension    This is a chronic condition.  Current Meds: losartan 25 mg qhs + 50 mg qAM, metoprolol 100 mg bid  Side effects: none  Home BP Log: none  Associated symptoms: no cp, no sob       Atherosclerosis of Aorta (Hcc)       Current Outpatient Medications Ordered in Epic   Medication Sig Dispense Refill   • nystatin (MYCOSTATIN) powder Apply 1 g topically 2 times a day. 15 g 1   • levothyroxine (SYNTHROID) 75 MCG Tab TAKE 1 TABLET BY MOUTH IN  THE MORNING ON AN EMPTY  STOMACH 90 Tablet 3   • losartan (COZAAR) 25 MG Tab TAKE 1 TABLET BY MOUTH AT  BEDTIME WITH LOSARTAN 50 MG FOR TOTAL DAILY DOSE OF 75  MG 90 Tablet 3   • omeprazole (PRILOSEC) 20 MG delayed-release capsule TAKE 1 CAPSULE BY MOUTH  TWICE DAILY 180 Capsule 1   • losartan  "(COZAAR) 50 MG Tab TAKE 1 TABLET BY MOUTH IN  THE MORNING TAKE WITH  LOSARTAN 25 MG FOR TOTAL  DAILY DOSE OF 75 MG 90 Tablet 3   • metFORMIN ER (GLUCOPHAGE XR) 500 MG TABLET SR 24 HR TAKE 1 TABLET BY MOUTH  TWICE DAILY 180 Tablet 3   • metoprolol tartrate (LOPRESSOR) 100 MG Tab TAKE 1 TABLET BY MOUTH  TWICE DAILY 180 Tablet 3   • atorvastatin (LIPITOR) 40 MG Tab TAKE 1 TABLET BY MOUTH  DAILY 90 Tablet 3   • docusate sodium (COLACE) 100 MG Cap Take 1 capsule by mouth 2 times a day. 30 capsule 0   • magnesium oxide (MAG-OX) 400 MG Tab tablet Take 400 mg by mouth 2 times a day.     • NON SPECIFIED Take 8 Tablets by mouth every day. Greens  gummies     • Ferrous Sulfate (IRON PO) Take 1 tablet by mouth every day. OTC     • estradiol (ESTRACE) 0.1 MG/GM vaginal cream Insert 1 g into the vagina every 72 hours. 42.5 g 3     No current [facility]-ordered facility-administered medications on file.     ROS:  Review of Systems   Constitutional: Negative for chills and fever.   HENT: Negative for ear pain and sore throat.    Eyes: Negative for blurred vision and double vision.   Respiratory: Negative for cough and shortness of breath.    Cardiovascular: Negative for chest pain and palpitations.   Gastrointestinal: Negative for nausea and vomiting.   Genitourinary: Negative for dysuria and hematuria.   Musculoskeletal: Negative for myalgias.   Neurological: Negative for dizziness, tingling, tremors, sensory change, speech change, focal weakness, weakness and headaches.       Objective:     Exam:  /84   Pulse 79   Temp 36.3 °C (97.4 °F) (Temporal)   Resp 16   Ht 1.626 m (5' 4\")   Wt 82.6 kg (182 lb)   SpO2 98%   BMI 31.24 kg/m²  Body mass index is 31.24 kg/m².    Physical Exam  Constitutional:       General: She is not in acute distress.     Appearance: Normal appearance. She is obese. She is not ill-appearing, toxic-appearing or diaphoretic.   HENT:      Head: Normocephalic and atraumatic.   Eyes:      General: No scleral " icterus.        Right eye: No discharge.         Left eye: No discharge.      Extraocular Movements: Extraocular movements intact.      Conjunctiva/sclera: Conjunctivae normal.      Pupils: Pupils are equal, round, and reactive to light.   Cardiovascular:      Rate and Rhythm: Normal rate and regular rhythm.      Pulses: Normal pulses.      Heart sounds: No murmur heard.  Pulmonary:      Effort: Pulmonary effort is normal. No respiratory distress.   Neurological:      Mental Status: She is alert.      Coordination: Coordination normal.      Gait: Gait normal.             Assessment & Plan:     76 y.o. female with the following -     Problem List Items Addressed This Visit     Controlled diabetes mellitus type 2 with complications (HCC)     Chronic  Pt missed A1c  F/u repeat  Cont metformin, will discuss inc if needed         Essential hypertension     Chronic  Not at goal  Inc losartan to 50mg bid  Cont metoprolol 100mg bid           Atherosclerosis of aorta (HCC)     Chronic,   Cont statin  Cont BP control  Can discuss repeat lipid panel               Return in about 1 week (around 3/16/2022), or if symptoms worsen or fail to improve, for f/u bp, f/u labs.    Please note that this dictation was created using voice recognition software. I have made every reasonable attempt to correct obvious errors, but I expect that there are errors of grammar and possibly content that I did not discover before finalizing the note.

## 2022-03-10 ENCOUNTER — HOSPITAL ENCOUNTER (OUTPATIENT)
Dept: LAB | Facility: MEDICAL CENTER | Age: 77
End: 2022-03-10
Attending: FAMILY MEDICINE
Payer: MEDICARE

## 2022-03-10 DIAGNOSIS — E11.8 CONTROLLED TYPE 2 DIABETES MELLITUS WITH COMPLICATION, WITHOUT LONG-TERM CURRENT USE OF INSULIN (HCC): ICD-10-CM

## 2022-03-10 LAB
CREAT UR-MCNC: 50.37 MG/DL
EST. AVERAGE GLUCOSE BLD GHB EST-MCNC: 171 MG/DL
HBA1C MFR BLD: 7.6 % (ref 4–5.6)
MICROALBUMIN UR-MCNC: 1.9 MG/DL
MICROALBUMIN/CREAT UR: 38 MG/G (ref 0–30)

## 2022-03-10 PROCEDURE — 82043 UR ALBUMIN QUANTITATIVE: CPT

## 2022-03-10 PROCEDURE — 36415 COLL VENOUS BLD VENIPUNCTURE: CPT | Mod: GA

## 2022-03-10 PROCEDURE — 82570 ASSAY OF URINE CREATININE: CPT

## 2022-03-10 PROCEDURE — 83036 HEMOGLOBIN GLYCOSYLATED A1C: CPT | Mod: GA

## 2022-03-16 ENCOUNTER — OFFICE VISIT (OUTPATIENT)
Dept: MEDICAL GROUP | Facility: PHYSICIAN GROUP | Age: 77
End: 2022-03-16
Payer: MEDICARE

## 2022-03-16 VITALS
RESPIRATION RATE: 18 BRPM | WEIGHT: 181 LBS | TEMPERATURE: 97.6 F | HEIGHT: 64 IN | BODY MASS INDEX: 30.9 KG/M2 | DIASTOLIC BLOOD PRESSURE: 88 MMHG | SYSTOLIC BLOOD PRESSURE: 122 MMHG | OXYGEN SATURATION: 96 % | HEART RATE: 83 BPM

## 2022-03-16 DIAGNOSIS — E11.8 CONTROLLED TYPE 2 DIABETES MELLITUS WITH COMPLICATION, WITHOUT LONG-TERM CURRENT USE OF INSULIN (HCC): ICD-10-CM

## 2022-03-16 DIAGNOSIS — I10 ESSENTIAL HYPERTENSION: ICD-10-CM

## 2022-03-16 PROCEDURE — 99213 OFFICE O/P EST LOW 20 MIN: CPT | Performed by: FAMILY MEDICINE

## 2022-03-16 RX ORDER — METOPROLOL TARTRATE 100 MG/1
TABLET ORAL
Qty: 90 TABLET | Refills: 3 | Status: SHIPPED | OUTPATIENT
Start: 2022-03-16 | End: 2022-03-18 | Stop reason: SDUPTHER

## 2022-03-16 ASSESSMENT — ENCOUNTER SYMPTOMS
SHORTNESS OF BREATH: 0
HEADACHES: 0
BLURRED VISION: 0
MYALGIAS: 0
DOUBLE VISION: 0
FEVER: 0
PALPITATIONS: 0
NAUSEA: 0
CHILLS: 0
COUGH: 0
DIZZINESS: 0
SORE THROAT: 0
VOMITING: 0

## 2022-03-16 NOTE — PROGRESS NOTES
Subjective:     CC: BP check    HPI:   Kristin presents today with bp logs for review  0/13 at goal  Denies symptoms  Pt was increased to 100mg total of losartan and 100mg bid metoprolol.    Pt would like to finish out the 25mg she has on hand  (two tabs at night)    Problem   Controlled Diabetes Mellitus Type 2 With Complications (Hcc)    This is a chronic condition.  Current medications:  Insulin: -  Biguanide: metformin  mg bid  GLP1-RA: -  SGLT-2i: -  DPP4-I: -  TZD: -  Shravan: -  Sulfonyluria: -    Last A1c: 6.9% (8/2021)  Last Microalb/Cr ratio: normal (2/2021)  Fasting sugars: n/a  Last diabetic foot exam: 3/2021  Last retinal eye exam: 3/2021  ACEi/ARB: losartan 25 mg  Statin: atorvastatin 40 mg  Aspirin: recommended - age >49 + HTN  Concomitant HTN: yes - at goal  Nightly foot checks: no       Essential Hypertension    This is a chronic condition.  Current Meds: losartan 25 mg qhs + 50 mg qAM, metoprolol 100 mg bid  Side effects: none  Home BP Log: none  Associated symptoms: no cp, no sob           Current Outpatient Medications Ordered in Epic   Medication Sig Dispense Refill   • metoprolol tartrate (LOPRESSOR) 100 MG Tab 1 tab po daily 90 Tablet 3   • nystatin (MYCOSTATIN) powder Apply 1 g topically 2 times a day. 15 g 1   • levothyroxine (SYNTHROID) 75 MCG Tab TAKE 1 TABLET BY MOUTH IN  THE MORNING ON AN EMPTY  STOMACH 90 Tablet 3   • omeprazole (PRILOSEC) 20 MG delayed-release capsule TAKE 1 CAPSULE BY MOUTH  TWICE DAILY 180 Capsule 1   • metFORMIN ER (GLUCOPHAGE XR) 500 MG TABLET SR 24 HR TAKE 1 TABLET BY MOUTH  TWICE DAILY 180 Tablet 3   • atorvastatin (LIPITOR) 40 MG Tab TAKE 1 TABLET BY MOUTH  DAILY 90 Tablet 3   • docusate sodium (COLACE) 100 MG Cap Take 1 capsule by mouth 2 times a day. 30 capsule 0   • magnesium oxide (MAG-OX) 400 MG Tab tablet Take 400 mg by mouth 2 times a day.     • NON SPECIFIED Take 8 Tablets by mouth every day. Greens  gummies     • Ferrous Sulfate (IRON PO) Take 1 tablet  "by mouth every day. OTC     • estradiol (ESTRACE) 0.1 MG/GM vaginal cream Insert 1 g into the vagina every 72 hours. 42.5 g 3     No current [facility]-ordered facility-administered medications on file.     ROS:  Review of Systems   Constitutional: Negative for chills and fever.   HENT: Negative for ear pain and sore throat.    Eyes: Negative for blurred vision and double vision.   Respiratory: Negative for cough and shortness of breath.    Cardiovascular: Negative for chest pain and palpitations.   Gastrointestinal: Negative for nausea and vomiting.   Genitourinary: Negative for dysuria and hematuria.   Musculoskeletal: Negative for myalgias.   Neurological: Negative for dizziness and headaches.       Objective:     Exam:  /88 (BP Location: Left arm, Patient Position: Sitting, BP Cuff Size: Adult)   Pulse 83   Temp 36.4 °C (97.6 °F) (Temporal)   Resp 18   Ht 1.626 m (5' 4\")   Wt 82.1 kg (181 lb)   SpO2 96%   BMI 31.07 kg/m²  Body mass index is 31.07 kg/m².    Physical Exam  Constitutional:       General: She is not in acute distress.     Appearance: Normal appearance. She is not ill-appearing, toxic-appearing or diaphoretic.   HENT:      Head: Normocephalic and atraumatic.   Eyes:      General: No scleral icterus.        Right eye: No discharge.         Left eye: No discharge.      Extraocular Movements: Extraocular movements intact.      Conjunctiva/sclera: Conjunctivae normal.      Pupils: Pupils are equal, round, and reactive to light.   Cardiovascular:      Rate and Rhythm: Normal rate and regular rhythm.      Pulses: Normal pulses.      Heart sounds: No murmur heard.  Pulmonary:      Effort: Pulmonary effort is normal. No respiratory distress.   Neurological:      Mental Status: She is alert.      Coordination: Coordination normal.      Gait: Gait normal.         Assessment & Plan:     76 y.o. female with the following -     Problem List Items Addressed This Visit     Controlled diabetes mellitus type 2 " with complications (HCC)     Chronic  a1c increased from prior  Random glucose monitor in office not functional  Pt to msg back or bring a log for review at next visit.  Cont strong DM diet and exercise  Cont metformin.   rtc if concerns         Essential hypertension     Chronic,   Cont 100mg losartan and metoprolol 100 bid  Pt will return with BP logs for next visit  Discussed holding parameters and when another medication may be needed.           Relevant Medications    metoprolol tartrate (LOPRESSOR) 100 MG Tab        Return in about 1 week (around 3/23/2022), or if symptoms worsen or fail to improve, for f/u bp.    Please note that this dictation was created using voice recognition software. I have made every reasonable attempt to correct obvious errors, but I expect that there are errors of grammar and possibly content that I did not discover before finalizing the note.

## 2022-03-16 NOTE — ASSESSMENT & PLAN NOTE
Chronic,   Cont 100mg losartan and metoprolol 100 bid  Pt will return with BP logs for next visit  Discussed holding parameters and when another medication may be needed.

## 2022-03-16 NOTE — ASSESSMENT & PLAN NOTE
Chronic  a1c increased from prior  Random glucose monitor in office not functional  Pt to msg back or bring a log for review at next visit.  Cont strong DM diet and exercise  Cont metformin.   rtc if concerns

## 2022-03-18 DIAGNOSIS — I10 ESSENTIAL HYPERTENSION: ICD-10-CM

## 2022-03-18 RX ORDER — METOPROLOL TARTRATE 100 MG/1
100 TABLET ORAL 2 TIMES DAILY
Qty: 180 TABLET | Refills: 3 | Status: SHIPPED | OUTPATIENT
Start: 2022-03-18 | End: 2023-02-02

## 2022-03-18 NOTE — TELEPHONE ENCOUNTER
"Received request via: Pharmacy    Was the patient seen in the last year in this department? Yes    Does the patient have an active prescription (recently filled or refills available) for medication(s) requested? No     Pharmacy: \"Please clarify the directions for Metoprolol Tart 100MG. Typically dosed TWICE DAILY.\"     Take  1 Tablet by mouth once daily     Take 1 tablet by mouth twice daily (patient history)   "

## 2022-03-25 ENCOUNTER — OFFICE VISIT (OUTPATIENT)
Dept: MEDICAL GROUP | Facility: PHYSICIAN GROUP | Age: 77
End: 2022-03-25
Payer: MEDICARE

## 2022-03-25 VITALS
RESPIRATION RATE: 16 BRPM | DIASTOLIC BLOOD PRESSURE: 80 MMHG | BODY MASS INDEX: 31 KG/M2 | WEIGHT: 181.6 LBS | HEIGHT: 64 IN | HEART RATE: 71 BPM | OXYGEN SATURATION: 98 % | SYSTOLIC BLOOD PRESSURE: 140 MMHG | TEMPERATURE: 97.6 F

## 2022-03-25 DIAGNOSIS — J30.2 SEASONAL ALLERGIES: ICD-10-CM

## 2022-03-25 DIAGNOSIS — I10 ESSENTIAL HYPERTENSION: ICD-10-CM

## 2022-03-25 DIAGNOSIS — E11.29 TYPE 2 DIABETES MELLITUS WITH MICROALBUMINURIA, WITHOUT LONG-TERM CURRENT USE OF INSULIN (HCC): ICD-10-CM

## 2022-03-25 DIAGNOSIS — R80.9 TYPE 2 DIABETES MELLITUS WITH MICROALBUMINURIA, WITHOUT LONG-TERM CURRENT USE OF INSULIN (HCC): ICD-10-CM

## 2022-03-25 PROBLEM — E11.9 TYPE 2 DIABETES MELLITUS (HCC): Status: ACTIVE | Noted: 2020-12-01

## 2022-03-25 PROBLEM — T78.40XA ALLERGIES: Status: ACTIVE | Noted: 2022-03-25

## 2022-03-25 PROCEDURE — 99214 OFFICE O/P EST MOD 30 MIN: CPT | Performed by: FAMILY MEDICINE

## 2022-03-25 RX ORDER — METFORMIN HYDROCHLORIDE 500 MG/1
TABLET, EXTENDED RELEASE ORAL
Qty: 270 TABLET | Refills: 1 | Status: SHIPPED | OUTPATIENT
Start: 2022-03-25 | End: 2022-08-15

## 2022-03-25 RX ORDER — LOSARTAN POTASSIUM 50 MG/1
50 TABLET ORAL 2 TIMES DAILY
Qty: 180 TABLET | Refills: 1 | Status: SHIPPED | OUTPATIENT
Start: 2022-03-25 | End: 2022-08-24

## 2022-03-25 ASSESSMENT — ENCOUNTER SYMPTOMS
FEVER: 0
CHILLS: 0
SHORTNESS OF BREATH: 0

## 2022-03-25 NOTE — PROGRESS NOTES
"Subjective:     CC: f/u DM    HPI:   Krisitn presents today with    Problem   Seasonal Allergies    Rhinorrhea, slightly itchy ears, sneezing  No itchy, watery eyes  No itchy throat    Tried: claritin, zyrtec, xyzal - maybe some relief    Does get rhinorrhea when she eats.      Type 2 Diabetes Mellitus (Hcc)    This is a chronic condition.  Current medications:  Insulin: -  Biguanide: metformin  mg bid  GLP1-RA: -  SGLT-2i: -  DPP4-I: -  TZD: -  Shravan: -  Sulfonyluria: -    Last A1c: 7.6% (3/2022); 6.9% (2021)  Last Microalb/Cr ratio: 38 (3/2022)  Fasting sugars: n/a  Last diabetic foot exam: 3/2022  Last retinal eye exam: 3/2022 - no retinopathy  ACEi/ARB: losartan 25 mg  Statin: atorvastatin 40 mg  Aspirin: recommended - age >49 + HTN  Concomitant HTN: yes - at goal  Nightly foot checks: no       Essential Hypertension    This is a chronic condition.  Current Meds: losartan 50 bid, metoprolol 100 mg bid  Side effects: none  Home BP Los-130s/70s-80s  Associated symptoms: no cp, no sob, no dizziness/lightheaded           Health Maintenance: Completed    ROS:  Review of Systems   Constitutional: Negative for chills and fever.   Respiratory: Negative for shortness of breath.    Cardiovascular: Negative for chest pain.       Objective:     Exam:  /80 (BP Location: Left arm, Patient Position: Sitting, BP Cuff Size: Adult)   Pulse 71   Temp 36.4 °C (97.6 °F) (Temporal)   Resp 16   Ht 1.626 m (5' 4\")   Wt 82.4 kg (181 lb 9.6 oz)   SpO2 98%   BMI 31.17 kg/m²  Body mass index is 31.17 kg/m².    Physical Exam  Constitutional:       Appearance: Normal appearance.   Cardiovascular:      Rate and Rhythm: Normal rate and regular rhythm.      Heart sounds: Normal heart sounds.   Pulmonary:      Effort: Pulmonary effort is normal.      Breath sounds: Normal breath sounds.   Musculoskeletal:      Cervical back: Normal range of motion and neck supple.   Feet:      Comments: Diabetic foot exam: No lesions or " calluses noted. 2+ pedal pulses. Sensation intact with 10 out of 10 on monofilament test.  Neurological:      Mental Status: She is alert.       Assessment & Plan:     76 y.o. female with the following -     Problem List Items Addressed This Visit     Type 2 diabetes mellitus (HCC)     Chronic, uncontrolled.  Hemoglobin A1c is above 7.0%.  She is up-to-date with all of her diabetic screenings, on an ARB for renal protection, and is on a statin for cardiovascular protection.  We will adjust the current regimen for better control.  -Increase Metformin ER to 1000 mg every morning and 500 mg every afternoon         Relevant Medications    metFORMIN ER (GLUCOPHAGE XR) 500 MG TABLET SR 24 HR    Other Relevant Orders    Diabetic Monofilament Lower Extremity Exam (Completed)    Essential hypertension     Chronic, controlled.  Blood pressure is over 140/90 in the office today but her home log shows she is consistently under 140/90, we will continue the current regimen.  -Losartan 50 mg twice daily  -Metoprolol 100 mg twice daily         Relevant Medications    losartan (COZAAR) 50 MG Tab    Seasonal allergies     Chronic, acutely exacerbated.  She reports that her seasonal allergies have been especially bothersome recently.  She is getting a lot of sneezing, rhinorrhea, and some slightly itchy ears.  She has tried Claritin, Zyrtec, and Xyzal without relief.  -Try Flonase for a month  -If no improvement, could potentially be vasomotor rhinitis and we could try ipratropium nasal spray               Return in about 3 months (around 6/25/2022) for f/u DM, get A1c.    Please note that this dictation was created using voice recognition software. I have made every reasonable attempt to correct obvious errors, but I expect that there are errors of grammar and possibly content that I did not discover before finalizing the note.

## 2022-03-25 NOTE — ASSESSMENT & PLAN NOTE
Chronic, uncontrolled.  Hemoglobin A1c is above 7.0%.  She is up-to-date with all of her diabetic screenings, on an ARB for renal protection, and is on a statin for cardiovascular protection.  We will adjust the current regimen for better control.  -Increase Metformin ER to 1000 mg every morning and 500 mg every afternoon

## 2022-03-25 NOTE — ASSESSMENT & PLAN NOTE
Chronic, acutely exacerbated.  She reports that her seasonal allergies have been especially bothersome recently.  She is getting a lot of sneezing, rhinorrhea, and some slightly itchy ears.  She has tried Claritin, Zyrtec, and Xyzal without relief.  -Try Flonase for a month  -If no improvement, could potentially be vasomotor rhinitis and we could try ipratropium nasal spray

## 2022-03-25 NOTE — ASSESSMENT & PLAN NOTE
Chronic, controlled.  Blood pressure is over 140/90 in the office today but her home log shows she is consistently under 140/90, we will continue the current regimen.  -Losartan 50 mg twice daily  -Metoprolol 100 mg twice daily

## 2022-03-25 NOTE — PATIENT INSTRUCTIONS
"For the runny nose:  - Use flonase every day for at least a month  - 2 sprays per nostril once per day  - Aim the nozzle towards the back of the head and towards the ear on the same side    For blood pressure  Home blood pressure monitoring:  - check your blood pressure every day and put it in a log  - pick a different time each day so we can see how it varies throughout the day  - when you check your blood pressure: make sure you sit quietly for 5-10 minutes beforehand, keep both feet flat on the ground, and make sure you use an arm cuff at heart height    Lifestyle factors to help manage blood pressure:  1) reduce stress with daily activity, consider yoga, breathing exercises (like 4-7-8 breathing technique)   2) reduce Na to <2000 mg/day  3) DASH diet     4) 200-300min/week cardio, which you can build up to.       Please check out \"life's simple 7\" at [email or web address]://[email or web address]/en/[email or web address]/lifes-[email or web address]-7      "

## 2022-04-11 ENCOUNTER — OFFICE VISIT (OUTPATIENT)
Dept: MEDICAL GROUP | Facility: PHYSICIAN GROUP | Age: 77
End: 2022-04-11
Payer: MEDICARE

## 2022-04-11 VITALS
DIASTOLIC BLOOD PRESSURE: 70 MMHG | HEIGHT: 64 IN | HEART RATE: 82 BPM | RESPIRATION RATE: 16 BRPM | BODY MASS INDEX: 30.63 KG/M2 | OXYGEN SATURATION: 95 % | TEMPERATURE: 98.6 F | WEIGHT: 179.4 LBS | SYSTOLIC BLOOD PRESSURE: 124 MMHG

## 2022-04-11 DIAGNOSIS — R00.2 PALPITATIONS: ICD-10-CM

## 2022-04-11 DIAGNOSIS — I10 ESSENTIAL HYPERTENSION: ICD-10-CM

## 2022-04-11 PROCEDURE — 99213 OFFICE O/P EST LOW 20 MIN: CPT | Performed by: FAMILY MEDICINE

## 2022-04-11 ASSESSMENT — ENCOUNTER SYMPTOMS
FEVER: 0
SHORTNESS OF BREATH: 0
CHILLS: 0

## 2022-04-11 NOTE — ASSESSMENT & PLAN NOTE
Chronic, controlled.  Blood pressure is at goal under 140/90 in the office today.  We will continue the current regimen.  -Losartan 50 mg twice daily  -Metoprolol 100 mg twice daily

## 2022-04-11 NOTE — PROGRESS NOTES
"Subjective:     CC: palpitations    HPI:   Kristin presents today with    Problem   Palpitations    Started 2 days ago  Hard heart beats - measured HR 90s  Didn't stop, so she became more anxious.   She went for a walk, 1.5 miles  No symptoms so far today     Essential Hypertension    This is a chronic condition.  Current Meds: losartan 50 bid, metoprolol 100 mg bid  Side effects: none  Home BP Los-130s/70s-80s (1 isolated )  Associated symptoms: no cp, no sob, no dizziness/lightheaded           Health Maintenance: Completed    ROS:  Review of Systems   Constitutional: Negative for chills and fever.   Respiratory: Negative for shortness of breath.    Cardiovascular: Negative for chest pain.       Objective:     Exam:  /70 (BP Location: Right arm, Patient Position: Sitting, BP Cuff Size: Adult)   Pulse 82   Temp 37 °C (98.6 °F) (Temporal)   Resp 16   Ht 1.626 m (5' 4\")   Wt 81.4 kg (179 lb 6.4 oz)   SpO2 95%   BMI 30.79 kg/m²  Body mass index is 30.79 kg/m².    Physical Exam  Constitutional:       Appearance: Normal appearance.   Cardiovascular:      Rate and Rhythm: Normal rate and regular rhythm.      Heart sounds: Normal heart sounds.   Pulmonary:      Effort: Pulmonary effort is normal.      Breath sounds: Normal breath sounds.   Musculoskeletal:      Cervical back: Normal range of motion and neck supple.   Neurological:      Mental Status: She is alert.         Assessment & Plan:     76 y.o. female with the following -     Problem List Items Addressed This Visit     Essential hypertension     Chronic, controlled.  Blood pressure is at goal under 140/90 in the office today.  We will continue the current regimen.  -Losartan 50 mg twice daily  -Metoprolol 100 mg twice daily         Palpitations     Acute.  A couple of days ago she started getting \"hard heartbeats\" that would not stop so she did become pretty anxious.  She reports her measured heart rate at that time was in the 90s.  She went " for a walk, reports she did feel better but also did not feel worse.  She has not had any symptoms so far today.  My suspicions were dealing with PVCs versus PACs.  After discussion she like to hold off on a heart monitor so we will continue to monitor.  In the meantime, ER precautions were discussed.               Referral for genetic research was offered. Patient declined.    I spent a total of 23 minutes with record review, exam, communication with the patient, and documentation of this encounter.      Return if symptoms worsen or fail to improve.    Please note that this dictation was created using voice recognition software. I have made every reasonable attempt to correct obvious errors, but I expect that there are errors of grammar and possibly content that I did not discover before finalizing the note.

## 2022-04-11 NOTE — PATIENT INSTRUCTIONS
Heart palpitations  Very commonly come from premature ventricular and atrial contractions. Very benign, especially if they never occur with exercise. If you get chest pain, shortness of breath, and/or lightheadedness or dizziness with the heart palpitations, go to the ER. Otherwise, they usually will go away on their own.    Palpitations  Palpitations are feelings that your heartbeat is not normal. Your heartbeat may feel like it is:  · Uneven.  · Faster than normal.  · Fluttering.  · Skipping a beat.  This is usually not a serious problem. In some cases, you may need tests to rule out any serious problems.  Follow these instructions at home:  Pay attention to any changes in your condition. Take these actions to help manage your symptoms:  Eating and drinking  · Avoid:  ? Coffee, tea, soft drinks, and energy drinks.  ? Chocolate.  ? Alcohol.  ? Diet pills.  Lifestyle    · Try to lower your stress. These things can help you relax:  ? Yoga.  ? Deep breathing and meditation.  ? Exercise.  ? Using words and images to create positive thoughts (guided imagery).  ? Using your mind to control things in your body (biofeedback).  · Do not use drugs.  · Get plenty of rest and sleep. Keep a regular bed time.  General instructions    · Take over-the-counter and prescription medicines only as told by your doctor.  · Do not use any products that contain nicotine or tobacco, such as cigarettes and e-cigarettes. If you need help quitting, ask your doctor.  · Keep all follow-up visits as told by your doctor. This is important. You may need more tests if palpitations do not go away or get worse.  Contact a doctor if:  · Your symptoms last more than 24 hours.  · Your symptoms occur more often.  Get help right away if you:  · Have chest pain.  · Feel short of breath.  · Have a very bad headache.  · Feel dizzy.  · Pass out (faint).  Summary  · Palpitations are feelings that your heartbeat is uneven or faster than normal. It may feel like  your heart is fluttering or skipping a beat.  · Avoid food and drinks that may cause palpitations. These include caffeine, chocolate, and alcohol.  · Try to lower your stress. Do not smoke or use drugs.  · Get help right away if you faint or have chest pain, shortness of breath, a severe headache, or dizziness.  This information is not intended to replace advice given to you by your health care provider. Make sure you discuss any questions you have with your health care provider.  Document Released: 09/26/2009 Document Revised: 01/30/2019 Document Reviewed: 01/30/2019  [email or web address] Patient Education © 2020 [email or web address]      Today, your Healthcare Provider may have discussed the following recommendations:    1. Exercise and Physical Activity  According to the American Heart Association, it is recommended to engage in physical activity regularly and to aim for 150 minutes of moderate-intensity aerobic activity per week.  Your Healthcare Provider may have recommended taking the stairs instead of the elevator, starting or maintaining a walking program or strength-training program.    2. Emotional Well-being  Mental and emotional well-being is essential to overall health.  Your Healthcare Provider may have encouraged you to build strong, positive relationships with family and friends, become more involved in your community (by volunteering or joining a spiritual community), or focus on self-care.    3. Fall and Injury Prevention  To prevent falls and injuries and also improve your balance, your Healthcare Provider may have suggested that you use a cane or walker, start an exercise of physical therapy program, or have your vision and/or hearing tested.    4. Urinary Leakage (Urinary Incontinence)  To control or manage the leakage of urine, your Healthcare Provider may have recommended you start bladder training exercises (such as Kegel exercises), a trial of a medication or a referral to see a specialist to discuss surgical  options.    5. Chronic Conditions  Your chronic conditions and any pertinent labs associated with those conditions were reviewed.    6. Medication List  Your medication list was reviewed with the medical assistant. If there were any changes, this was also discussed with your provider.    7. Health Maintenance and Preventive Care  If you had any overdue preventive care topics, these were reviewed and ordered for you.

## 2022-04-11 NOTE — ASSESSMENT & PLAN NOTE
"Acute.  A couple of days ago she started getting \"hard heartbeats\" that would not stop so she did become pretty anxious.  She reports her measured heart rate at that time was in the 90s.  She went for a walk, reports she did feel better but also did not feel worse.  She has not had any symptoms so far today.  My suspicions were dealing with PVCs versus PACs.  After discussion she like to hold off on a heart monitor so we will continue to monitor.  In the meantime, ER precautions were discussed.  "

## 2022-04-28 DIAGNOSIS — K21.9 GASTROESOPHAGEAL REFLUX DISEASE WITHOUT ESOPHAGITIS: ICD-10-CM

## 2022-04-29 RX ORDER — OMEPRAZOLE 20 MG/1
CAPSULE, DELAYED RELEASE ORAL
Qty: 180 CAPSULE | Refills: 1 | Status: SHIPPED | OUTPATIENT
Start: 2022-04-29 | End: 2022-10-13

## 2022-06-24 ENCOUNTER — TELEPHONE (OUTPATIENT)
Dept: MEDICAL GROUP | Facility: PHYSICIAN GROUP | Age: 77
End: 2022-06-24
Payer: MEDICARE

## 2022-06-24 ENCOUNTER — HOSPITAL ENCOUNTER (OUTPATIENT)
Dept: LAB | Facility: MEDICAL CENTER | Age: 77
End: 2022-06-24
Attending: PHYSICIAN ASSISTANT
Payer: MEDICARE

## 2022-06-24 ENCOUNTER — HOSPITAL ENCOUNTER (OUTPATIENT)
Dept: LAB | Facility: MEDICAL CENTER | Age: 77
End: 2022-06-24
Attending: FAMILY MEDICINE
Payer: MEDICARE

## 2022-06-24 DIAGNOSIS — E11.29 TYPE 2 DIABETES MELLITUS WITH MICROALBUMINURIA, WITHOUT LONG-TERM CURRENT USE OF INSULIN (HCC): ICD-10-CM

## 2022-06-24 DIAGNOSIS — R80.9 TYPE 2 DIABETES MELLITUS WITH MICROALBUMINURIA, WITHOUT LONG-TERM CURRENT USE OF INSULIN (HCC): ICD-10-CM

## 2022-06-24 LAB
ALBUMIN SERPL BCP-MCNC: 4.7 G/DL (ref 3.2–4.9)
ALBUMIN/GLOB SERPL: 1.6 G/DL
ALP SERPL-CCNC: 76 U/L (ref 30–99)
ALT SERPL-CCNC: 19 U/L (ref 2–50)
ANION GAP SERPL CALC-SCNC: 13 MMOL/L (ref 7–16)
AST SERPL-CCNC: 28 U/L (ref 12–45)
BILIRUB SERPL-MCNC: 0.8 MG/DL (ref 0.1–1.5)
BUN SERPL-MCNC: 18 MG/DL (ref 8–22)
CALCIUM SERPL-MCNC: 9.7 MG/DL (ref 8.5–10.5)
CHLORIDE SERPL-SCNC: 102 MMOL/L (ref 96–112)
CO2 SERPL-SCNC: 24 MMOL/L (ref 20–33)
CREAT SERPL-MCNC: 0.98 MG/DL (ref 0.5–1.4)
EST. AVERAGE GLUCOSE BLD GHB EST-MCNC: 143 MG/DL
GFR SERPLBLD CREATININE-BSD FMLA CKD-EPI: 59 ML/MIN/1.73 M 2
GLOBULIN SER CALC-MCNC: 2.9 G/DL (ref 1.9–3.5)
GLUCOSE SERPL-MCNC: 133 MG/DL (ref 65–99)
HBA1C MFR BLD: 6.6 % (ref 4–5.6)
POTASSIUM SERPL-SCNC: 4.5 MMOL/L (ref 3.6–5.5)
PROT SERPL-MCNC: 7.6 G/DL (ref 6–8.2)
SODIUM SERPL-SCNC: 139 MMOL/L (ref 135–145)

## 2022-06-24 PROCEDURE — 87086 URINE CULTURE/COLONY COUNT: CPT

## 2022-06-24 PROCEDURE — 36415 COLL VENOUS BLD VENIPUNCTURE: CPT

## 2022-06-24 PROCEDURE — 80053 COMPREHEN METABOLIC PANEL: CPT

## 2022-06-24 PROCEDURE — 83036 HEMOGLOBIN GLYCOSYLATED A1C: CPT | Mod: GA

## 2022-06-24 NOTE — TELEPHONE ENCOUNTER
Pt is here because she went to do her labs but she does not have any ordered. She stated  said she needed to do them again before her next appt. Please call with any questions. She will wait in lobby to find out

## 2022-06-24 NOTE — TELEPHONE ENCOUNTER
Phone Number Called: [phone] (home)       Call outcome: Spoke to patient regarding message below.    Message: pt notified

## 2022-06-27 LAB
BACTERIA UR CULT: NORMAL
SIGNIFICANT IND 70042: NORMAL
SITE SITE: NORMAL
SOURCE SOURCE: NORMAL

## 2022-06-29 ENCOUNTER — OFFICE VISIT (OUTPATIENT)
Dept: MEDICAL GROUP | Facility: PHYSICIAN GROUP | Age: 77
End: 2022-06-29
Payer: MEDICARE

## 2022-06-29 VITALS
WEIGHT: 176 LBS | HEART RATE: 79 BPM | HEIGHT: 64 IN | SYSTOLIC BLOOD PRESSURE: 126 MMHG | DIASTOLIC BLOOD PRESSURE: 72 MMHG | BODY MASS INDEX: 30.05 KG/M2 | OXYGEN SATURATION: 97 % | TEMPERATURE: 97.8 F

## 2022-06-29 DIAGNOSIS — E11.29 TYPE 2 DIABETES MELLITUS WITH MICROALBUMINURIA, WITHOUT LONG-TERM CURRENT USE OF INSULIN (HCC): ICD-10-CM

## 2022-06-29 DIAGNOSIS — R80.9 TYPE 2 DIABETES MELLITUS WITH MICROALBUMINURIA, WITHOUT LONG-TERM CURRENT USE OF INSULIN (HCC): ICD-10-CM

## 2022-06-29 PROCEDURE — 99213 OFFICE O/P EST LOW 20 MIN: CPT | Performed by: FAMILY MEDICINE

## 2022-06-29 ASSESSMENT — ENCOUNTER SYMPTOMS
CHILLS: 0
FEVER: 0
SHORTNESS OF BREATH: 0

## 2022-06-29 ASSESSMENT — FIBROSIS 4 INDEX: FIB4 SCORE: 1.988269693544868638

## 2022-06-29 NOTE — PROGRESS NOTES
"Subjective:     CC: diabetes    HPI:   Kristin presents today with    Problem   Type 2 Diabetes Mellitus (Hcc)    This is a chronic condition. Exercising/walking more. Changed her diet.  Current medications:  Insulin: -  Biguanide: metformin ER 1000 mg qAM + 500 qPM  GLP1-RA: -  SGLT-2i: -  DPP4-I: -  TZD: -  Shravan: -  Sulfonyluria: -    Last A1c: 6.6% (6/2022); 7.6% (3/2022); 6.9% (8/2021)  Last Microalb/Cr ratio: 38 (3/2022)  Fasting sugars: n/a  Last diabetic foot exam: 3/2022  Last retinal eye exam: 3/2022 - no retinopathy  ACEi/ARB: losartan 25 mg  Statin: atorvastatin 40 mg  Aspirin: n/a  Concomitant HTN: yes - at goal  Nightly foot checks: no           Health Maintenance: Completed    ROS:  Review of Systems   Constitutional: Negative for chills and fever.   Respiratory: Negative for shortness of breath.    Cardiovascular: Negative for chest pain.       Objective:     Exam:  /72 (BP Location: Left arm, Patient Position: Sitting, BP Cuff Size: Adult)   Pulse 79   Temp 36.6 °C (97.8 °F) (Temporal)   Ht 1.626 m (5' 4\")   Wt 79.8 kg (176 lb)   SpO2 97%   BMI 30.21 kg/m²  Body mass index is 30.21 kg/m².    Physical Exam  Constitutional:       Appearance: Normal appearance.   Cardiovascular:      Rate and Rhythm: Normal rate and regular rhythm.      Heart sounds: Normal heart sounds.   Pulmonary:      Effort: Pulmonary effort is normal.      Breath sounds: Normal breath sounds.   Musculoskeletal:      Cervical back: Normal range of motion and neck supple.   Neurological:      Mental Status: She is alert.       Assessment & Plan:     76 y.o. female with the following -     1. Type 2 diabetes mellitus with microalbuminuria, without long-term current use of insulin (HCC)  Chronic, controlled.  Hemoglobin A1c is under 7.0%.  She is up-to-date with all of her diabetic screenings, on an ARB for renal protection, and is on a statin for cardiovascular protection.  We will continue the current regimen.  - metformin " ER 1000 mg every morning and 500 mg every evening    Referral for genetic research was offered. Patient previously declined.    Return in about 6 months (around 12/29/2022) for f/u DM, F/u labs.    Please note that this dictation was created using voice recognition software. I have made every reasonable attempt to correct obvious errors, but I expect that there are errors of grammar and possibly content that I did not discover before finalizing the note.

## 2022-08-11 RX ORDER — ESTRADIOL 0.1 MG/G
CREAM VAGINAL
Qty: 42.5 G | Refills: 0 | Status: SHIPPED | OUTPATIENT
Start: 2022-08-11 | End: 2022-10-12

## 2022-08-12 DIAGNOSIS — R80.9 TYPE 2 DIABETES MELLITUS WITH MICROALBUMINURIA, WITHOUT LONG-TERM CURRENT USE OF INSULIN (HCC): ICD-10-CM

## 2022-08-12 DIAGNOSIS — E11.29 TYPE 2 DIABETES MELLITUS WITH MICROALBUMINURIA, WITHOUT LONG-TERM CURRENT USE OF INSULIN (HCC): ICD-10-CM

## 2022-08-15 RX ORDER — METFORMIN HYDROCHLORIDE 500 MG/1
TABLET, EXTENDED RELEASE ORAL
Qty: 270 TABLET | Refills: 3 | Status: SHIPPED | OUTPATIENT
Start: 2022-08-15 | End: 2023-07-18

## 2022-08-18 DIAGNOSIS — E78.2 MIXED HYPERLIPIDEMIA: ICD-10-CM

## 2022-08-18 RX ORDER — ATORVASTATIN CALCIUM 40 MG/1
40 TABLET, FILM COATED ORAL DAILY
Qty: 90 TABLET | Refills: 3 | Status: SHIPPED | OUTPATIENT
Start: 2022-08-18 | End: 2023-07-18

## 2022-10-07 ENCOUNTER — OFFICE VISIT (OUTPATIENT)
Dept: MEDICAL GROUP | Facility: PHYSICIAN GROUP | Age: 77
End: 2022-10-07
Payer: MEDICARE

## 2022-10-07 VITALS
DIASTOLIC BLOOD PRESSURE: 70 MMHG | HEART RATE: 80 BPM | WEIGHT: 172 LBS | OXYGEN SATURATION: 99 % | BODY MASS INDEX: 29.37 KG/M2 | HEIGHT: 64 IN | SYSTOLIC BLOOD PRESSURE: 140 MMHG | TEMPERATURE: 96.8 F

## 2022-10-07 DIAGNOSIS — M79.672 LEFT FOOT PAIN: Primary | ICD-10-CM

## 2022-10-07 DIAGNOSIS — Z23 NEED FOR VACCINATION: ICD-10-CM

## 2022-10-07 PROCEDURE — 90662 IIV NO PRSV INCREASED AG IM: CPT | Performed by: FAMILY MEDICINE

## 2022-10-07 PROCEDURE — 99213 OFFICE O/P EST LOW 20 MIN: CPT | Mod: 25 | Performed by: FAMILY MEDICINE

## 2022-10-07 PROCEDURE — G0008 ADMIN INFLUENZA VIRUS VAC: HCPCS | Performed by: FAMILY MEDICINE

## 2022-10-07 ASSESSMENT — ENCOUNTER SYMPTOMS
CHILLS: 0
FEVER: 0
SHORTNESS OF BREATH: 0

## 2022-10-07 ASSESSMENT — FIBROSIS 4 INDEX: FIB4 SCORE: 1.988269693544868638

## 2022-10-07 NOTE — PROGRESS NOTES
"Subjective:     CC: foot pain    HPI:   Kristin presents today with    Problem   Foot Pain    2 weeks  Left foot  Intermittent pain on dorsal tarsals  Any time she bears weight, pain  If not bearing weight, no pain  Will swell any time she is on the foot  No bruising  No recalled trauma  Tries to get 5000 steps/day, but has had to stop  Tried ice - unknown if helped         Health Maintenance: Completed    ROS:  Review of Systems   Constitutional:  Negative for chills and fever.   Respiratory:  Negative for shortness of breath.    Cardiovascular:  Negative for chest pain.     Objective:     Exam:  BP (!) 140/70 (BP Location: Right arm, Patient Position: Sitting, BP Cuff Size: Large adult)   Pulse 80   Temp 36 °C (96.8 °F) (Temporal)   Ht 1.626 m (5' 4\")   Wt 78 kg (172 lb)   SpO2 99%   BMI 29.52 kg/m²  Body mass index is 29.52 kg/m².    Physical Exam  Constitutional:       Appearance: Normal appearance.   Cardiovascular:      Rate and Rhythm: Normal rate and regular rhythm.      Heart sounds: Normal heart sounds.   Pulmonary:      Effort: Pulmonary effort is normal.      Breath sounds: Normal breath sounds.   Musculoskeletal:      Cervical back: Normal range of motion and neck supple.      Left foot: No deformity.   Feet:      Left foot:      Skin integrity: Skin integrity normal.      Comments: No TTP present anywhere in foot  Neurological:      Mental Status: She is alert.       Assessment & Plan:     76 y.o. female with the following -     1. Left foot pain  Acute.  She reports that for the last 2 weeks has been in significant pain over the dorsal aspect of the tarsals of her left foot anytime she bears weight it significantly affecting her ability to do activities.  No recall of trauma, bruising but she is swelling in that foot anytime she bears weight.  Unfortunately, I cannot precipitate the pain on a foot exam today some uncertain as to the cause of her pain.  We will get an x-ray to evaluate for stress " fracture.  If x-ray is negative then we will treat conservatively.  - DX-FOOT-COMPLETE 3+ LEFT; Future    2. Need for vaccination  - INFLUENZA VACCINE, HIGH DOSE (65+ ONLY)    Return if symptoms worsen or fail to improve.    Please note that this dictation was created using voice recognition software. I have made every reasonable attempt to correct obvious errors, but I expect that there are errors of grammar and possibly content that I did not discover before finalizing the note.

## 2022-10-10 ENCOUNTER — HOSPITAL ENCOUNTER (OUTPATIENT)
Dept: RADIOLOGY | Facility: MEDICAL CENTER | Age: 77
End: 2022-10-10
Attending: FAMILY MEDICINE
Payer: MEDICARE

## 2022-10-10 DIAGNOSIS — M79.672 LEFT FOOT PAIN: ICD-10-CM

## 2022-10-10 PROCEDURE — 73630 X-RAY EXAM OF FOOT: CPT | Mod: LT

## 2022-10-12 DIAGNOSIS — K21.9 GASTROESOPHAGEAL REFLUX DISEASE WITHOUT ESOPHAGITIS: ICD-10-CM

## 2022-10-12 RX ORDER — ESTRADIOL 0.1 MG/G
CREAM VAGINAL
Qty: 42.5 G | Refills: 0 | Status: SHIPPED | OUTPATIENT
Start: 2022-10-12 | End: 2023-09-29

## 2022-10-13 RX ORDER — OMEPRAZOLE 20 MG/1
CAPSULE, DELAYED RELEASE ORAL
Qty: 180 CAPSULE | Refills: 3 | Status: SHIPPED | OUTPATIENT
Start: 2022-10-13 | End: 2023-09-19

## 2022-11-11 ENCOUNTER — PATIENT MESSAGE (OUTPATIENT)
Dept: HEALTH INFORMATION MANAGEMENT | Facility: OTHER | Age: 77
End: 2022-11-11

## 2022-11-29 DIAGNOSIS — E11.29 TYPE 2 DIABETES MELLITUS WITH MICROALBUMINURIA, WITHOUT LONG-TERM CURRENT USE OF INSULIN (HCC): ICD-10-CM

## 2022-11-29 DIAGNOSIS — R94.4 DECREASED GFR: ICD-10-CM

## 2022-11-29 DIAGNOSIS — R80.9 TYPE 2 DIABETES MELLITUS WITH MICROALBUMINURIA, WITHOUT LONG-TERM CURRENT USE OF INSULIN (HCC): ICD-10-CM

## 2022-12-08 ENCOUNTER — HOSPITAL ENCOUNTER (OUTPATIENT)
Dept: LAB | Facility: MEDICAL CENTER | Age: 77
End: 2022-12-08
Attending: FAMILY MEDICINE
Payer: MEDICARE

## 2022-12-08 ENCOUNTER — OFFICE VISIT (OUTPATIENT)
Dept: MEDICAL GROUP | Facility: PHYSICIAN GROUP | Age: 77
End: 2022-12-08
Payer: MEDICARE

## 2022-12-08 VITALS
HEART RATE: 74 BPM | TEMPERATURE: 96.8 F | SYSTOLIC BLOOD PRESSURE: 136 MMHG | WEIGHT: 174 LBS | OXYGEN SATURATION: 98 % | BODY MASS INDEX: 29.71 KG/M2 | HEIGHT: 64 IN | DIASTOLIC BLOOD PRESSURE: 64 MMHG

## 2022-12-08 DIAGNOSIS — R19.4 CHANGE IN BOWEL HABITS: ICD-10-CM

## 2022-12-08 PROBLEM — R19.7 DIARRHEA: Status: ACTIVE | Noted: 2022-12-08

## 2022-12-08 LAB — CRP SERPL HS-MCNC: 2.76 MG/DL (ref 0–0.75)

## 2022-12-08 PROCEDURE — 82784 ASSAY IGA/IGD/IGG/IGM EACH: CPT

## 2022-12-08 PROCEDURE — 99213 OFFICE O/P EST LOW 20 MIN: CPT | Performed by: FAMILY MEDICINE

## 2022-12-08 PROCEDURE — 36415 COLL VENOUS BLD VENIPUNCTURE: CPT

## 2022-12-08 PROCEDURE — 86140 C-REACTIVE PROTEIN: CPT

## 2022-12-08 ASSESSMENT — FIBROSIS 4 INDEX: FIB4 SCORE: 2.01

## 2022-12-08 NOTE — PROGRESS NOTES
"Subjective:     CC: diarrhea    HPI:   Kristin presents today with    Problem   Change in Bowel Habits    2 years  Intermittent  Diarrhea - can be watery, mainly very soft stool  No blood in stool, +abd cramping - occasional, no nausea/vomiting, no fevers/chills  Current episode of diarrhea/soft stool intermittent for a month - 1-3 episodes/day    H/o sister with Crohn's  H/o mother needing partial colectomy for diverticulosis/itis         Health Maintenance: Completed    ROS:  See HPI    Objective:     Exam:  /64 (BP Location: Right arm, Patient Position: Sitting, BP Cuff Size: Large adult)   Pulse 74   Temp 36 °C (96.8 °F) (Temporal)   Ht 1.626 m (5' 4\")   Wt 78.9 kg (174 lb)   SpO2 98%   BMI 29.87 kg/m²  Body mass index is 29.87 kg/m².    Physical Exam  Constitutional:       Appearance: Normal appearance.   Cardiovascular:      Rate and Rhythm: Normal rate and regular rhythm.      Heart sounds: Normal heart sounds.   Pulmonary:      Effort: Pulmonary effort is normal.      Breath sounds: Normal breath sounds.   Abdominal:      General: Abdomen is flat. Bowel sounds are normal.      Palpations: Abdomen is soft.      Tenderness: There is no abdominal tenderness.   Musculoskeletal:      Cervical back: Normal range of motion and neck supple.   Neurological:      Mental Status: She is alert.         Assessment & Plan:     77 y.o. female with the following -     1. Change in bowel habits  Chronic, stable.  For 2 years she reports that she had intermittent episodes of diarrhea.  However, she is not really getting watery stool, mostly very soft stool.  She can get anywhere between 1 and 3 episodes per day when she does have a flare of the symptoms.  Also is that she gets the urgency to have a bowel movement she has to do it right away.  She denies any blood in the stool, fever/chills, nausea/vomiting.  Up until this week there was no associated omkar pain but she notes some mild abdominal cramping in the last " week.  Has a family history of Crohn's disease in her sister and the need for colectomy for diverticulosis in her mother.  At her age it would be unlikely that we would first diagnosed her with an inflammatory bowel disease but will check labs to confirm.  We will also screen her for celiac that she has no weight loss or other signs of malnutrition.  She is concerned about colon cancer but her last colonoscopy 2 years ago was largely normal except for some polyps I do not have the pathology report for the polyps.  Therefore, we are going to get some basic lab work that I suspect will be normal and we will get her back to GI.  Since she is not having watery diarrhea stool studies would not be helpful.  Since she really does not have any abdominal pain I cannot diagnose her with IBS.  More likely this is microscopic colitis we will send her to GI for likely colonoscopy with biopsy.  - CBC WITHOUT DIFFERENTIAL; Future  - Basic Metabolic Panel; Future  - TRANSGLUTAMINASE ANTIBODIES; Future  - IGA SERUM QUANT; Future  - CRP QUANTITIVE (NON-CARDIAC); Future  - Referral to Gastroenterology    Return if symptoms worsen or fail to improve.    Please note that this dictation was created using voice recognition software. I have made every reasonable attempt to correct obvious errors, but I expect that there are errors of grammar and possibly content that I did not discover before finalizing the note.

## 2022-12-10 LAB — IGA SERPL-MCNC: 254 MG/DL (ref 68–408)

## 2022-12-12 ENCOUNTER — TELEPHONE (OUTPATIENT)
Dept: MEDICAL GROUP | Facility: PHYSICIAN GROUP | Age: 77
End: 2022-12-12
Payer: MEDICARE

## 2022-12-12 NOTE — TELEPHONE ENCOUNTER
----- Message from Hamida Hooper M.D. sent at 12/12/2022  9:54 AM PST -----  Please contact lab - why didn't they collect all of the labs ordered on 12/8? They only collected 2

## 2022-12-12 NOTE — TELEPHONE ENCOUNTER
Phone Number Called: [phone]. [phone]    Call outcome:  spoke with     Message:  was not able to determine why all orders were not drawn. It is possible that the pt only brought in the paper copies of the two orders that were completed so the lab only snow for what was given rather than looking in the chart for other orders.

## 2022-12-12 NOTE — TELEPHONE ENCOUNTER
Ok. I will send a message to the patient telling her she needs to go back to the lab to get the rest of the labwork done.

## 2022-12-13 ENCOUNTER — TELEPHONE (OUTPATIENT)
Dept: MEDICAL GROUP | Facility: PHYSICIAN GROUP | Age: 77
End: 2022-12-13
Payer: MEDICARE

## 2022-12-13 ENCOUNTER — HOSPITAL ENCOUNTER (OUTPATIENT)
Dept: LAB | Facility: MEDICAL CENTER | Age: 77
End: 2022-12-13
Attending: FAMILY MEDICINE
Payer: MEDICARE

## 2022-12-13 DIAGNOSIS — R94.4 DECREASED GFR: ICD-10-CM

## 2022-12-13 DIAGNOSIS — R80.9 TYPE 2 DIABETES MELLITUS WITH MICROALBUMINURIA, WITHOUT LONG-TERM CURRENT USE OF INSULIN (HCC): ICD-10-CM

## 2022-12-13 DIAGNOSIS — E11.29 TYPE 2 DIABETES MELLITUS WITH MICROALBUMINURIA, WITHOUT LONG-TERM CURRENT USE OF INSULIN (HCC): ICD-10-CM

## 2022-12-13 DIAGNOSIS — R19.4 CHANGE IN BOWEL HABITS: ICD-10-CM

## 2022-12-13 LAB
ANION GAP SERPL CALC-SCNC: 10 MMOL/L (ref 7–16)
BUN SERPL-MCNC: 20 MG/DL (ref 8–22)
CALCIUM SERPL-MCNC: 10 MG/DL (ref 8.5–10.5)
CHLORIDE SERPL-SCNC: 103 MMOL/L (ref 96–112)
CO2 SERPL-SCNC: 25 MMOL/L (ref 20–33)
CREAT SERPL-MCNC: 0.82 MG/DL (ref 0.5–1.4)
GFR SERPLBLD CREATININE-BSD FMLA CKD-EPI: 73 ML/MIN/1.73 M 2
GLUCOSE SERPL-MCNC: 114 MG/DL (ref 65–99)
POTASSIUM SERPL-SCNC: 5 MMOL/L (ref 3.6–5.5)
SODIUM SERPL-SCNC: 138 MMOL/L (ref 135–145)

## 2022-12-13 PROCEDURE — 80048 BASIC METABOLIC PNL TOTAL CA: CPT

## 2022-12-13 PROCEDURE — 36415 COLL VENOUS BLD VENIPUNCTURE: CPT

## 2022-12-13 PROCEDURE — 86364 TISS TRNSGLTMNASE EA IG CLAS: CPT | Mod: 91

## 2022-12-13 NOTE — TELEPHONE ENCOUNTER
"Patient was at [facility] today on Dk Donovan from lab came to me regarding pt stating she wants another lab ordered, stated she was confused about what patient was talking about. PCP was not in office at this time, looked into patients chart noticed a BMP from 12/8/2022 that was not completed- saw PCP message to pt stating she needed to go to the lab and complete her labs, patient has 3 other labs that needed to be completed as well. I told Venus I would talk to patient and encourage her to receive all labs that PCP has ordered for patient as this is what PCP requested via [employer] message. Explained to patient the following information above pt stated \"you are not a doctor you do not know what I need.\" I stated \"correct I am an MA that did review your chart, and your messages between your doctor and you and I can tell you by reading this information PCP has provided that she would like you to complete your labs prior to your upcoming visit Your A1C has been standing since November from what I saw and I see Dr. Hooper's message reminding you to complete this back in Novemeber\" Patient was confused regarding what these labs were for and why she needed to have them done, I stated I was not sure why as that is not my scope of practice but I can assure you at least completing your A1C, and BMP PCP would appreciate to have prior to your next visit with her. Patient responded \"you do not know anything of what I need done I am not doing these today.\" I stated \"okay I will let Dr. Hooper know you refused labs today.\" Patient stated she does not refuse labs she will only do one. I stated okay, well if you are only doing one lab I recommend you get your BMP. Patient stated that was what I told you from the start you just think you know more than me. I stated I was not going to argue and that I would talk to PCP when Dr. Hooper had a minute to round with me.. Patient agreed I do so and \"let Dr. Hooper tell me how wrong I " "am.\" Venus then jumped in and encouraged a couple labs and I believed the situation would then be resolved.   "

## 2022-12-16 LAB
TTG IGA SER IA-ACNC: <2 U/ML (ref 0–3)
TTG IGG SER IA-ACNC: 6 U/ML (ref 0–5)

## 2022-12-29 ENCOUNTER — HOSPITAL ENCOUNTER (OUTPATIENT)
Facility: MEDICAL CENTER | Age: 77
End: 2022-12-29
Attending: UROLOGY | Admitting: UROLOGY
Payer: MEDICARE

## 2023-01-05 ENCOUNTER — APPOINTMENT (OUTPATIENT)
Dept: ADMISSIONS | Facility: MEDICAL CENTER | Age: 78
End: 2023-01-05
Payer: MEDICARE

## 2023-01-06 ENCOUNTER — TELEPHONE (OUTPATIENT)
Dept: MEDICAL GROUP | Facility: PHYSICIAN GROUP | Age: 78
End: 2023-01-06
Payer: MEDICARE

## 2023-01-06 ENCOUNTER — HOSPITAL ENCOUNTER (OUTPATIENT)
Dept: LAB | Facility: MEDICAL CENTER | Age: 78
End: 2023-01-06
Attending: FAMILY MEDICINE
Payer: MEDICARE

## 2023-01-06 DIAGNOSIS — E11.29 TYPE 2 DIABETES MELLITUS WITH MICROALBUMINURIA, WITHOUT LONG-TERM CURRENT USE OF INSULIN (HCC): ICD-10-CM

## 2023-01-06 DIAGNOSIS — R80.9 TYPE 2 DIABETES MELLITUS WITH MICROALBUMINURIA, WITHOUT LONG-TERM CURRENT USE OF INSULIN (HCC): ICD-10-CM

## 2023-01-06 LAB
EST. AVERAGE GLUCOSE BLD GHB EST-MCNC: 154 MG/DL
HBA1C MFR BLD: 7 % (ref 4–5.6)

## 2023-01-06 PROCEDURE — 83036 HEMOGLOBIN GLYCOSYLATED A1C: CPT | Mod: GA

## 2023-01-06 PROCEDURE — 36415 COLL VENOUS BLD VENIPUNCTURE: CPT | Mod: GA

## 2023-01-06 NOTE — TELEPHONE ENCOUNTER
Pt is here because lab had cancelled her A1C order in December so when pt showed up today to have it done the order shows canceled. She will wait in lobby so she can get it done.

## 2023-01-11 ENCOUNTER — OFFICE VISIT (OUTPATIENT)
Dept: MEDICAL GROUP | Facility: PHYSICIAN GROUP | Age: 78
End: 2023-01-11
Payer: MEDICARE

## 2023-01-11 VITALS
HEIGHT: 64 IN | DIASTOLIC BLOOD PRESSURE: 70 MMHG | WEIGHT: 175 LBS | HEART RATE: 72 BPM | BODY MASS INDEX: 29.88 KG/M2 | TEMPERATURE: 97 F | OXYGEN SATURATION: 98 % | SYSTOLIC BLOOD PRESSURE: 146 MMHG

## 2023-01-11 DIAGNOSIS — I10 ESSENTIAL HYPERTENSION: ICD-10-CM

## 2023-01-11 DIAGNOSIS — I70.0 ATHEROSCLEROSIS OF AORTA (HCC): ICD-10-CM

## 2023-01-11 DIAGNOSIS — R19.4 CHANGE IN BOWEL HABITS: ICD-10-CM

## 2023-01-11 DIAGNOSIS — E11.29 TYPE 2 DIABETES MELLITUS WITH MICROALBUMINURIA, WITHOUT LONG-TERM CURRENT USE OF INSULIN (HCC): ICD-10-CM

## 2023-01-11 DIAGNOSIS — N18.2 STAGE 2 CHRONIC KIDNEY DISEASE: ICD-10-CM

## 2023-01-11 DIAGNOSIS — R80.9 TYPE 2 DIABETES MELLITUS WITH MICROALBUMINURIA, WITHOUT LONG-TERM CURRENT USE OF INSULIN (HCC): ICD-10-CM

## 2023-01-11 PROCEDURE — 99214 OFFICE O/P EST MOD 30 MIN: CPT | Performed by: FAMILY MEDICINE

## 2023-01-11 ASSESSMENT — PATIENT HEALTH QUESTIONNAIRE - PHQ9: CLINICAL INTERPRETATION OF PHQ2 SCORE: 0

## 2023-01-11 ASSESSMENT — FIBROSIS 4 INDEX: FIB4 SCORE: 2.01

## 2023-01-11 NOTE — PROGRESS NOTES
"Subjective:     CC: review labs    HPI:   Kristin presents today with    Problem   Stage 2 Chronic Kidney Disease   Change in Bowel Habits    2 years  Intermittent  Diarrhea - can be watery, mainly very soft stool  No blood in stool, +abd cramping - occasional, no nausea/vomiting, no fevers/chills  Current episode of diarrhea/soft stool intermittent for a month - 1-3 episodes/day    H/o sister with Crohn's  H/o mother needing partial colectomy for diverticulosis/itis    1/11/2023  She reports the diarrhea has improved, 75-80% improved  She did have 2 courses of amoxicillin for tooth infections recently (last course finished 1.5 weeks ago)       Type 2 Diabetes Mellitus (Hcc)    This is a chronic condition. Exercising/walking more. Changed her diet.  Current medications:  Insulin: -  Biguanide: metformin ER 1000 mg qAM + 500 qPM  GLP1-RA: -  SGLT-2i: -  DPP4-I: -  TZD: -  Shravan: -  Sulfonyluria: -    Last A1c: 7.0% (1/2023); 6.6% (6/2022); 7.6% (3/2022); 6.9% (8/2021)  Last Microalb/Cr ratio: 38 (3/2022)  Fasting sugars: n/a  Last diabetic foot exam: 3/2022  Last retinal eye exam: 3/2022 - no retinopathy  ACEi/ARB: losartan 100 mg  Statin: atorvastatin 40 mg  Aspirin: n/a  Concomitant HTN: yes - at goal  Nightly foot checks: no       Essential Hypertension    This is a chronic condition.  Current Meds: losartan 50 bid, metoprolol 100 mg bid  Side effects: none  Home BP Log: none  Associated symptoms: no cp, no sob, no dizziness/lightheaded           Health Maintenance: Completed    ROS:  See HPI    Objective:     Exam:  BP (!) 146/70 (BP Location: Left arm, Patient Position: Sitting, BP Cuff Size: Large adult)   Pulse 72   Temp 36.1 °C (97 °F) (Temporal)   Ht 1.626 m (5' 4\")   Wt 79.4 kg (175 lb)   SpO2 98%   BMI 30.04 kg/m²  Body mass index is 30.04 kg/m².    Physical Exam  Constitutional:       Appearance: Normal appearance.   Cardiovascular:      Rate and Rhythm: Normal rate and regular rhythm.      Heart sounds: " Normal heart sounds.   Pulmonary:      Effort: Pulmonary effort is normal.      Breath sounds: Normal breath sounds.   Musculoskeletal:      Cervical back: Normal range of motion and neck supple.   Neurological:      Mental Status: She is alert.       Assessment & Plan:     77 y.o. female with the following -     1. Stage 2 chronic kidney disease  Chronic, stable.  Labs 6 months ago did show GFR was decreased at stage III level but repeat labs recently shows the GFR is in the stage II level.  We did discuss what she can do at home to help prevent further deterioration of her kidney function and we will monitor yearly.    2. Essential hypertension  Chronic, uncontrolled.  Blood pressure is above 140/90 in the office today on 2 readings.  We will continue her current dosing and she will check her blood pressure at home regularly and we will reevaluate in 1 month.  - Losartan 50 mg twice daily  - Metoprolol 100 mg twice daily    3. Type 2 diabetes mellitus with microalbuminuria, without long-term current use of insulin (HCC)  Chronic, uncontrolled.  Hemoglobin A1c is 7.0% and her goal is to keep her under 7.0%.  Since she is so close to goal we will continue her current regimen while she works on lifestyle factors and weight loss.  - Metformin ER 1000 mg every morning +500 mg every afternoon    4. Change in bowel habits  Chronic, improved.  At a previous appointment she mentioned she had had increased diarrhea for the last 2 years.  Today she reports an approximate 80% improvement of her symptoms since she has completed 2 courses of amoxicillin for tooth infection.  Possible some of the diarrhea was related to the antibiotic use but she has not been on antibiotics for 2 years.  Initial work-up does not show any signs of celiac disease.  CRP was mildly elevated but this was during the setting of a tooth infection so the CRP could be falsely elevated.  Since she has had such a significant improvement in her diarrhea for  now are going to watch and see.  However, if it worsens again we will get her to GI for possible colonoscopy.    HCC Gap Form    Diagnosis to address: I70.0 - Atherosclerosis of aorta (HCC)  Assessment and plan: Chronic, stable.  Noted on a CT scan in 2021.  Continue with current defined treatment plan: Atorvastatin 40 mg daily. Follow-up at least annually.  Last edited 01/11/23 12:04 PST by Hamida Hooper M.D.         Return in about 3 months (around 4/11/2023) for f/u DM, get A1c.    Please note that this dictation was created using voice recognition software. I have made every reasonable attempt to correct obvious errors, but I expect that there are errors of grammar and possibly content that I did not discover before finalizing the note.

## 2023-01-11 NOTE — PATIENT INSTRUCTIONS
"Home blood pressure monitoring:  - check your blood pressure every day and put it in a log  - pick a different time each day so we can see how it varies throughout the day  - when you check your blood pressure: make sure you sit quietly for 5-10 minutes beforehand, keep both feet flat on the ground, and make sure you use an arm cuff at heart height    Lifestyle factors to help manage blood pressure:  1) reduce stress with daily activity, consider yoga, breathing exercises (like 4-7-8 breathing technique)   2) reduce sodium to <2000 mg/day  3) DASH diet     4) 200-300 min/week cardio, which you can build up to.       Please check out \"life's essential 8\" at https://www.heart.org/en/healthy-living/healthy-lifestyle/lifes-[email or web address]-8   "

## 2023-01-13 DIAGNOSIS — E03.4 HYPOTHYROIDISM DUE TO ACQUIRED ATROPHY OF THYROID: ICD-10-CM

## 2023-01-16 RX ORDER — LEVOTHYROXINE SODIUM 0.07 MG/1
TABLET ORAL
Qty: 90 TABLET | Refills: 0 | Status: SHIPPED | OUTPATIENT
Start: 2023-01-16 | End: 2023-04-03

## 2023-01-16 NOTE — TELEPHONE ENCOUNTER
Received request via: Pharmacy    Was the patient seen in the last year in this department? Yes    Does the patient have an active prescription (recently filled or refills available) for medication(s) requested? No    Does the patient have custodial Plus and need 100 day supply (blood pressure, diabetes and cholesterol meds only)? Patient does not have SCP

## 2023-01-30 ENCOUNTER — OFFICE VISIT (OUTPATIENT)
Dept: MEDICAL GROUP | Facility: PHYSICIAN GROUP | Age: 78
End: 2023-01-30
Payer: MEDICARE

## 2023-01-30 VITALS
SYSTOLIC BLOOD PRESSURE: 147 MMHG | DIASTOLIC BLOOD PRESSURE: 89 MMHG | TEMPERATURE: 97.2 F | OXYGEN SATURATION: 97 % | HEIGHT: 64 IN | BODY MASS INDEX: 29.88 KG/M2 | HEART RATE: 84 BPM | WEIGHT: 175 LBS

## 2023-01-30 DIAGNOSIS — F41.9 ANXIETY: ICD-10-CM

## 2023-01-30 DIAGNOSIS — I10 ESSENTIAL HYPERTENSION: ICD-10-CM

## 2023-01-30 PROCEDURE — 99214 OFFICE O/P EST MOD 30 MIN: CPT | Performed by: FAMILY MEDICINE

## 2023-01-30 ASSESSMENT — ANXIETY QUESTIONNAIRES
5. BEING SO RESTLESS THAT IT IS HARD TO SIT STILL: NOT AT ALL
7. FEELING AFRAID AS IF SOMETHING AWFUL MIGHT HAPPEN: NOT AT ALL
4. TROUBLE RELAXING: MORE THAN HALF THE DAYS
6. BECOMING EASILY ANNOYED OR IRRITABLE: SEVERAL DAYS
1. FEELING NERVOUS, ANXIOUS, OR ON EDGE: MORE THAN HALF THE DAYS
GAD7 TOTAL SCORE: 11
2. NOT BEING ABLE TO STOP OR CONTROL WORRYING: NEARLY EVERY DAY
3. WORRYING TOO MUCH ABOUT DIFFERENT THINGS: NEARLY EVERY DAY

## 2023-01-30 ASSESSMENT — FIBROSIS 4 INDEX: FIB4 SCORE: 2.01

## 2023-01-31 NOTE — PROGRESS NOTES
"Subjective:     CC: Blood pressure    HPI:   Kristin presents today with    Problem   Anxiety    Chronic. She reports since her  has passed she has had anxiety. Since 2018. She will certainly get anxious when she has to measure her blood pressure.     AURELIANO-7 Questionnaire    Feeling nervous, anxious, or on edge: More than half the days  Not being able to sop or control worrying: Nearly every day  Worrying too much about different things: Nearly every day  Trouble relaxing: More than half the days  Being so restless that it's hard to sit still: Not at all  Becoming easily annoyed or irritable: Several days  Feeling afraid as if something awful might happen: Not at all  Total: 11    Interpretation of AURELIANO 7 Total Score   Score Severity :  0-4 No Anxiety   5-9 Mild Anxiety  10-14 Moderate Anxiety  15-21 Severe Anxiety         Essential Hypertension    This is a chronic condition.  Current Meds: losartan 50 bid, metoprolol 100 mg bid  Side effects: none  Home BP Los-190s/70s-160s (avg 153/97, avg 151/94 w/o high)  Associated symptoms: no cp, no sob, no dizziness/lightheaded, no headaches    Called EMS earlier today for BP up to 190s/160s. ALANNA suspected her cuff was too small.     Cuff is appropriate size for her arm. Reports when ALANNA checked her BP cuff, it was 20 points higher than their reading.          Health Maintenance: Completed    ROS:  See HPI    Objective:     Exam:  BP (!) 147/89 (BP Location: Left arm, Patient Position: Sitting, BP Cuff Size: Adult) Comment: Home BP Machine  Pulse 84   Temp 36.2 °C (97.2 °F) (Temporal)   Ht 1.626 m (5' 4\")   Wt 79.4 kg (175 lb)   SpO2 97%   BMI 30.04 kg/m²  Body mass index is 30.04 kg/m².    Physical Exam  Constitutional:       Appearance: Normal appearance.   Cardiovascular:      Rate and Rhythm: Normal rate and regular rhythm.      Heart sounds: Normal heart sounds.   Pulmonary:      Effort: Pulmonary effort is normal.      Breath sounds: Normal breath " sounds.   Musculoskeletal:      Cervical back: Normal range of motion and neck supple.   Neurological:      Mental Status: She is alert.       Assessment & Plan:     77 y.o. female with the following -     1. Essential hypertension  Chronic, uncontrolled.  She called EMS earlier today for a blood pressure of 192/127.  She reports that she was completely asymptomatic, had a normal EKG with EMS and their blood pressures were better.  She is high in the office initially but she has been stressed about it all day.  Repeat blood pressure is a little bit better but still above 140/90.  Her cuff is the accurate size for her arm and was decently close to the reading we got in the office today.  I suspect there is a large component of anxiety contributing to her blood pressures would recommend she stop checking blood pressures at home.  Wendy leave her medical regimen alone and will get a 24-hour ambulatory blood pressure monitor to get more accurate data.  - Losartan 50 mg twice daily  - Metoprolol 100 mg twice daily  - Referral to Vascular Medicine    2. Anxiety  Chronic, stable.  She reports that probably for most of her life she has been pretty anxious but her  would help calm her down.  Its been more uncontrolled since her  passed in 2018.  She does admit that she gets stressed about her blood pressure which may contribute to some of her extremely high readings.  We will going to focus on the blood pressure as that is her most important focus of concern, once we get better control we will discuss anxiety at a future visit and find better ways to manage it.    Return if symptoms worsen or fail to improve.    Please note that this dictation was created using voice recognition software. I have made every reasonable attempt to correct obvious errors, but I expect that there are errors of grammar and possibly content that I did not discover before finalizing the note.

## 2023-02-02 ENCOUNTER — OFFICE VISIT (OUTPATIENT)
Dept: VASCULAR LAB | Facility: MEDICAL CENTER | Age: 78
End: 2023-02-02
Attending: FAMILY MEDICINE
Payer: MEDICARE

## 2023-02-02 VITALS
BODY MASS INDEX: 30.22 KG/M2 | WEIGHT: 177 LBS | HEART RATE: 79 BPM | HEIGHT: 64 IN | SYSTOLIC BLOOD PRESSURE: 122 MMHG | DIASTOLIC BLOOD PRESSURE: 82 MMHG

## 2023-02-02 DIAGNOSIS — E03.4 HYPOTHYROIDISM DUE TO ACQUIRED ATROPHY OF THYROID: ICD-10-CM

## 2023-02-02 DIAGNOSIS — I10 ESSENTIAL HYPERTENSION: ICD-10-CM

## 2023-02-02 DIAGNOSIS — F41.9 ANXIETY: ICD-10-CM

## 2023-02-02 DIAGNOSIS — N18.2 CKD STAGE G2/A2, GFR 60-89 AND ALBUMIN CREATININE RATIO 30-299 MG/G: ICD-10-CM

## 2023-02-02 DIAGNOSIS — R80.9 TYPE 2 DIABETES MELLITUS WITH MICROALBUMINURIA, WITHOUT LONG-TERM CURRENT USE OF INSULIN (HCC): ICD-10-CM

## 2023-02-02 DIAGNOSIS — E11.29 TYPE 2 DIABETES MELLITUS WITH MICROALBUMINURIA, WITHOUT LONG-TERM CURRENT USE OF INSULIN (HCC): ICD-10-CM

## 2023-02-02 DIAGNOSIS — I70.0 ATHEROSCLEROSIS OF AORTA (HCC): ICD-10-CM

## 2023-02-02 DIAGNOSIS — E78.2 MIXED HYPERLIPIDEMIA: ICD-10-CM

## 2023-02-02 DIAGNOSIS — G47.33 OBSTRUCTIVE SLEEP APNEA SYNDROME: ICD-10-CM

## 2023-02-02 PROCEDURE — 99204 OFFICE O/P NEW MOD 45 MIN: CPT | Performed by: FAMILY MEDICINE

## 2023-02-02 PROCEDURE — 99212 OFFICE O/P EST SF 10 MIN: CPT

## 2023-02-02 RX ORDER — PRAZOSIN HYDROCHLORIDE 1 MG/1
1 CAPSULE ORAL 3 TIMES DAILY PRN
Qty: 90 CAPSULE | Refills: 0 | Status: SHIPPED | OUTPATIENT
Start: 2023-02-02 | End: 2023-02-22

## 2023-02-02 RX ORDER — CARVEDILOL 12.5 MG/1
12.5 TABLET ORAL 2 TIMES DAILY WITH MEALS
Qty: 180 TABLET | Refills: 3 | Status: SHIPPED | OUTPATIENT
Start: 2023-02-02 | End: 2023-11-15

## 2023-02-02 RX ORDER — TELMISARTAN 80 MG/1
80 TABLET ORAL
Qty: 90 TABLET | Refills: 3 | Status: SHIPPED | OUTPATIENT
Start: 2023-02-02 | End: 2023-11-15

## 2023-02-02 ASSESSMENT — ENCOUNTER SYMPTOMS
WHEEZING: 0
SENSORY CHANGE: 0
NEUROLOGICAL NEGATIVE: 1
PALPITATIONS: 0
DIZZINESS: 0
SPUTUM PRODUCTION: 0
FEVER: 0
HEMOPTYSIS: 0
PND: 0
CLAUDICATION: 0
HEADACHES: 0
FOCAL WEAKNESS: 0
COUGH: 0
ORTHOPNEA: 0
SHORTNESS OF BREATH: 0
CHILLS: 0
WEAKNESS: 0

## 2023-02-02 ASSESSMENT — FIBROSIS 4 INDEX: FIB4 SCORE: 2.01

## 2023-02-02 NOTE — PROGRESS NOTES
RESISTANT HTN CLINIC - INITIAL EVALUATION   23      Kristin Barrios is a female seen for evaluation of resistant HTN and management.   Kristin Barrios is initially referred by Hamida Hooper M.D.     Subjective    HTN:  Current/interval HTN concerns: recent BP lability with spiking to 190s despite current meds.  No sx, seen by EMS recently had reported normal ekg and no ED visit.  Possible anxiety component though she denies physical manifestations of anxiety.  Currently feeling well.  Home BP los/70s,  occ spikes to >160/100 w/o sx   Adherence to current HTN meds: compliant all of the time   Lifestyle factors:  Weight Change: stable   BMI Readings from Last 3 Encounters:   23 30.38 kg/m²   23 30.04 kg/m²   23 30.04 kg/m²      Diet pattern: common adult  Daily salt intake estimate:  Moderate     EtOH: No  Exercise habits: no regular exercise program  Perceived barriers to care: none   Pertinent HTN hx (reviewed at initial visit):   Age at HTN dx: >5yrs   (if female, any hx of pregnancy-related HTN): no  Past HTN medications: as noted   HTN crises:  EMS eval due to 190s/120s w/o sx, nl EKG in 2023  Interfering substances/contributing factors:  None    Hyperlipidemia:  Stable, no current concerns  Current treatment: High intensity  atorva     Antiplatelet/anticoagulation: No    T2D:  Stable. No current symptoms reported.   Tolerating meds and no recent med changes.   Last A1c   Lab Results   Component Value Date    HBA1C 7.0 (H) 2023     Lab Results   Component Value Date/Time    MALBCRT 38 (H) 03/10/2022 11:26 AM    MICROALBUR 1.9 03/10/2022 11:26 AM          CKD: Yes, Details: high UACR only      Sleeping disorder/JUNG: Yes, Details: on CPAP, good adherence     Hypothyroidism: Yes, Details: stable on meds       Patient Active Problem List    Diagnosis Date Noted    Anxiety 2023    Stage 2 chronic kidney disease 2023    Change in bowel habits 2022     Palpitations 04/11/2022    Seasonal allergies 03/25/2022    Overactive bladder 02/17/2022    Dermatitis of eyelid of left eye 08/23/2021    Eczema 08/23/2021    Foot pain 08/13/2021    Mass of urinary bladder 05/28/2021    Recurrent UTI 02/09/2021    Type 2 diabetes mellitus (HCC) 12/01/2020    Essential hypertension 12/01/2020    Mixed hyperlipidemia 12/01/2020    Hypothyroidism due to acquired atrophy of thyroid 12/01/2020    Gastroesophageal reflux disease without esophagitis 12/01/2020    Hiatal hernia 11/09/2020    History of basal cell carcinoma (BCC) 04/04/2019    Atherosclerosis of aorta (HCC) 08/25/2015    Diverticulosis of colon 09/09/2013    Sleep apnea 07/03/2012    Tinnitus of both ears 10/03/2006     Past Surgical History:   Procedure Laterality Date    TURBT (TRANSURETHRAL RESECTION OF BLADDER TUMOR)  5/28/2021    Procedure: TURBT (TRANSURETHRAL RESECTION OF BLADDER TUMOR) - BIPOLAR.;  Surgeon: Dave Arteaga M.D.;  Location: SURGERY [facility];  Service: Urology    ABDOMINAL HYSTERECTOMY TOTAL  1970?    CHOLECYSTECTOMY  1995?    LUMPECTOMY Right     benign    OTHER      colonoscopy X2       Current Outpatient Medications:     telmisartan, 80 mg, Oral, QHS    carvedilol, 12.5 mg, Oral, BID WITH MEALS    prazosin, 1 mg, Oral, TID PRN    levothyroxine, TAKE 1 TABLET BY MOUTH IN  THE MORNING ON AN EMPTY  STOMACH, Taking    omeprazole, TAKE 1 CAPSULE BY MOUTH  TWICE DAILY, Taking    estradiol, INSERT 1 GRAM VAGINALLY  EVERY 72 HOURS, Taking    atorvastatin, 40 mg, Oral, DAILY, Taking    metFORMIN ER, TAKE 2 TABLETS BY MOUTH IN  THE MORNING AND 1 TABLET BY MOUTH IN THE EVENING, Taking    magnesium oxide, 400 mg, Oral, BID, Taking    NON SPECIFIED, 8 Tablet, Oral, DAILY, Taking    Ferrous Sulfate (IRON PO), 1 Tablet, Oral, DAILY, Taking   Lisinopril and Warfarin    Family History   Problem Relation Age of Onset    Cancer Father         lymphoma    Cancer Sister         renal    Cancer Brother          "renal    Diabetes Neg Hx     Ovarian Cancer Neg Hx     Tubal Cancer Neg Hx     Peritoneal Cancer Neg Hx     Colorectal Cancer Neg Hx     Breast Cancer Neg Hx      Social History     Tobacco Use    Smoking status: Never    Smokeless tobacco: Never   Vaping Use    Vaping Use: Never used   Substance Use Topics    Alcohol use: Not Currently     Comment: every couple of years 1 drink     Drug use: Never      Review of Systems   Constitutional:  Negative for chills, fever and malaise/fatigue.   Respiratory:  Negative for cough, hemoptysis, sputum production, shortness of breath and wheezing.    Cardiovascular:  Negative for chest pain, palpitations, orthopnea, claudication, leg swelling and PND.   Neurological: Negative.  Negative for dizziness, sensory change, focal weakness, weakness and headaches.         Objective    Vitals:    02/02/23 0801 02/02/23 0806   BP: 131/79 122/82   BP Location: Left arm Left arm   Patient Position: Sitting Sitting   BP Cuff Size: Adult Adult   Pulse: 77 79   Weight: 80.3 kg (177 lb)    Height: 1.626 m (5' 4\")      Body mass index is 30.38 kg/m².  BP Readings from Last 5 Encounters:   02/02/23 122/82   01/30/23 (!) 147/89   01/11/23 (!) 146/70   12/08/22 136/64   10/07/22 (!) 140/70      Physical Exam  Vitals reviewed.   Constitutional:       General: She is not in acute distress.     Appearance: Normal appearance.   HENT:      Head: Normocephalic and atraumatic.   Neck:      Thyroid: No thyroid mass.      Vascular: Normal carotid pulses.      Trachea: Trachea normal.   Cardiovascular:      Rate and Rhythm: Normal rate and regular rhythm.      Chest Wall: PMI is not displaced.      Pulses: Normal pulses.           Carotid pulses are 2+ on the right side and 2+ on the left side.       Radial pulses are 2+ on the right side and 2+ on the left side.        Dorsalis pedis pulses are 2+ on the right side and 2+ on the left side.        Posterior tibial pulses are 2+ on the right side and 2+ on " the left side.      Heart sounds: Normal heart sounds.   Pulmonary:      Effort: Pulmonary effort is normal.      Breath sounds: Normal breath sounds.   Musculoskeletal:      Cervical back: Full passive range of motion without pain.      Right lower leg: No edema.      Left lower leg: No edema.   Skin:     General: Skin is warm and dry.      Capillary Refill: Capillary refill takes less than 2 seconds.      Coloration: Skin is not cyanotic.      Nails: There is no clubbing.   Neurological:      General: No focal deficit present.      Mental Status: She is alert and oriented to person, place, and time. Mental status is at baseline.      Cranial Nerves: No cranial nerve deficit.      Coordination: Coordination is intact. Coordination normal.      Gait: Gait is intact. Gait normal.   Psychiatric:         Mood and Affect: Mood normal.         Behavior: Behavior normal.      Accessory Clinical Findings:     Lab Results   Component Value Date    CHOLSTRLTOT 184 01/07/2022    LDL 78 01/07/2022    HDL 37 (A) 01/07/2022    TRIGLYCERIDE 346 (H) 01/07/2022      No results found for: LIPOPROTA   No results found for: APOB    Lab Results   Component Value Date    PROTHROMBTM 13.2 05/24/2021    INR 0.97 05/24/2021       Lab Results   Component Value Date    HBA1C 7.0 (H) 01/06/2023      Lab Results   Component Value Date    SODIUM 138 12/13/2022    POTASSIUM 5.0 12/13/2022    CHLORIDE 103 12/13/2022    CO2 25 12/13/2022    GLUCOSE 114 (H) 12/13/2022    BUN 20 12/13/2022    CREATININE 0.82 12/13/2022          Lab Results   Component Value Date    URCREAT 50.37 03/10/2022    MICROALBUR 1.9 03/10/2022    MALBCRT 38 (H) 03/10/2022     Lab Results   Component Value Date/Time    MALBCRT 38 (H) 03/10/2022 11:26 AM    MICROALBUR 1.9 03/10/2022 11:26 AM      VASCULAR IMAGING:     CT abd 4/2021   Adrenals: Adrenal glands are unremarkable.   Vasculature: Renal veins are patent. Moderate atherosclerotic disease of the abdominal aorta and its  branches.            MEDICAL DECISION-MAKING:  TODAY'S ASSESSMENT / STATUS / PLAN:  1. Essential hypertension  ALDOSTERONE    CBC WITH DIFFERENTIAL    Comp Metabolic Panel    CORTISOL    METANEPHRINES PLASMA    MICROALBUMIN CREAT RATIO URINE    RENIN ACTIVITY    TSH WITH REFLEX TO FT4    telmisartan (MICARDIS) 80 MG Tab    carvedilol (COREG) 12.5 MG Tab    prazosin (MINIPRESS) 1 MG Cap      2. Type 2 diabetes mellitus with microalbuminuria, without long-term current use of insulin (HCC)  Lipid Profile    LDL, DIRECT    APOLIPOPROTEIN B    Lipoprotein (a)      3. Mixed hyperlipidemia  Lipid Profile    LDL, DIRECT    APOLIPOPROTEIN B    Lipoprotein (a)      4. Hypothyroidism due to acquired atrophy of thyroid  TSH WITH REFLEX TO FT4      5. Atherosclerosis of aorta (HCC)        6. Obstructive sleep apnea syndrome        7. Anxiety        8. CKD stage G2/A2, GFR 60-89 and albumin creatinine ratio  mg/g  Comp Metabolic Panel    MICROALBUMIN CREAT RATIO URINE         Patient Type: DM    1. BLOOD PRESSURE CONTROL   Qualifies as Resistant HTN (RH):  No, not on optimized, max-dosed or max-tolerated meds from 3 classes   Office BP Goal ACC/AHA (2017) goal <130/80  Home BP at goal:  no  Office BP at goal:  no  24h ABPM:  not ordered to date   Device candidate? Not available at this time   Contributing factors: anxiety, obesity, T2D, CKD   - possibly paroxysmal HTN 2/2 AURELIANO, will address pheo and other secondary causes and monitor closely   - trial of 4-7-8 breathing and consider resperate biofeedback - gave handouts   Plan:   Monitoring:   - start/continue home BP monitoring, reviewed correct technique:  Yes   - order 24h ABPM: UNDECIDED  - monitor lytes/gfr routinely   - advised that stabilizing BP may require multiple med changes and close monitoring over the next several months, reviewed that initially there will be additional imaging and labs and the possibility of adverse drug rxn's and variability of his BP and HR  until we have things stabilized.  Advised to contact our office as needed for questions or concerns.      2. WORK UP FOR SECONDARY CAUSES OF RH:  JUNG on CPAP:  Strongly encouraged CPAP adherence to reduce RV strain, improve overall fatigue symptoms, and improve BP in both the short- and long-term as per HIPARCO (2013) and HIPARCO-2 (2021) studies.   - continue CPAP, defer mgmt to sleep MD     Hypothyroidism: stable  Tolerating meds, TSH normal but at upper limit of normal    - continue current treatment plan, defer mgmt to PCP     Renal parenchymal disease:  G2  Renovascular HTN:  CT normal in past   Primary Aldosteronism: Not Yet Assessed, no adrenal nodules on prior CT 2022   Pheochromocytoma: Not Yet Assessed   Cushing's:   Not Yet Assessed   Coarctation of Aorta: excluded  Other: none identified    Recommendations At This Visit: as noted in orders         3. MEDICATION USE / ADHERENCE:   Assessment: Complete  Recommendations: Instructed to Continue Taking All Medications As Prescribed         4. HTN-MEDIATED END-ORGAN DAMAGE:  Left Ventricular Hypertrophy: Absent on  ECG Date: 2021  Urine Albuminuria:  G2  on Date: 2022  Renal Function: G1-2  - avoid nephrotoxins  - continue HTN control with RAAS blockade   - monitor lytes/gfr routinely  - eval with nephrology if worsening over time  Ophthalmologic: Absent per patient report and/or eye specialist   Established Cardiovascular Disease:     # Non-aneurysmal aortic atherosclerosis - no symptoms or prior known associated clinical manifestations  -Serves as general indicator of systemic atherosclerosis with higher potential atherosclerosis is other vascular beds including coronaries, carotids, cerebral aa.   -Higher overall ASCVD risk and clinical manifestations related to thromboembolic and, less commonly, atheroembolic potential of plaque if becomes complex, >4mm, or associated plaque ulceration or instrumentation of the aorta  - no further imaging surveillance,  "continue med mgmt     5. LIFESTYLE INTERVENTIONS:    SMOKING:    reports that she has never smoked. She has never used smokeless tobacco.   - continued complete avoidance of all tobacco products     PHYSICAL ACTIVITY: continue healthy activity to improve CV fitness.  In general, targeting >150min/week of moderate-level activity or as much as tolerated in light of functional status and co-morbidities     WEIGHT MANAGEMENT AND NUTRITION: DASH style dietary approach , Focus on reduced sodium to <2,000mg per day , Weight reduction approach - reduce caloric intake by 300kcal/day to achieve 1-2lb weight loss per week , and Provide specific dietary approach handouts      6. STANDARD HTN PHARMACOTHERAPY:  - stop losartan, start telmisartan 80mg - best potency, 24h coverage, best ARB for MetS and TG reduction     7. ADDITIONAL AGENTS   - stop metoprolol, start carvedilol 12.5mg BID (better in HTN mgmt) - alpha-blockade likely helpful in anxiety    - consider doxazosin as next agent due to anxiety component     LIPID MANAGEMENT:   Qualifies for Statin Therapy Based on 2018 ACC/AHA Guidelines: yes, Diabetes and UACR >30 and eGFR <60 (high risk DM, risk >20%)   The 10-year ASCVD risk score (Prince BAGLEY, et al., 2019) is: 45.8%, >20% \"high risk\"   Major ASCVD events: None    High-risk conditions: N/A  Risk-enhancers: CKD, Metabolic syndrome , and Persistently elevated trigs >174  Has type IIb phenotype - metabolic dyslipemia presumed due to insulin resistance and reduce LPL activity   Currently on Statin: Yes  Tx goals: LDL-C <70 (consider non-HDL-C <100, apoB <80) per 2022  At goal? No, 1/2022  Plan:   - reinforced ongoing TLC measures as noted   - monitor labs - include apoB due to moderate HTG  Meds:   - continue atorva 40mg     GLYCEMIA MANAGEMENT:  Diabetic, T2  Goal A1c < 7.0  Lab Results   Component Value Date    HBA1C 7.0 (H) 01/06/2023      Lab Results   Component Value Date/Time    MALBCRT 38 (H) 03/10/2022 11:26 AM    " PABLOBUR 1.9 03/10/2022 11:26 AM     Plan:  - continue current medication plan   - recommmend for routine care with PCP (or endocrine) to include regular A1c monitoring, annual albumin/creatinine ratio (ACR), annual diabetic retinopathy screening, foot exams, annual flu vaccine, and updates to pneumonia vaccines as appropriate      ANTIPLATELET/ANTICOAGULANT THERAPY:  not indicated     OTHER ISSUES:     # anxiety - possibly related to BP spikes, monitor for s/s  - use 4-7-8 breathing, consider resperate device - gave handouts   - consider SSRI therapy with non-activating options citalopram, paxil     Studies Ordered At This Visit: consider 24h ABPM at next visit    Blood Work to Be Obtained Prior to Next Visit: as noted below   Disposition: RTC in 6 weeks      Paulino Fink M.D.  [facility]   [facility]   [phone]

## 2023-02-03 DIAGNOSIS — E78.2 MIXED HYPERLIPIDEMIA: ICD-10-CM

## 2023-02-14 ENCOUNTER — HOSPITAL ENCOUNTER (OUTPATIENT)
Dept: LAB | Facility: MEDICAL CENTER | Age: 78
End: 2023-02-14
Attending: FAMILY MEDICINE
Payer: MEDICARE

## 2023-02-14 DIAGNOSIS — E03.4 HYPOTHYROIDISM DUE TO ACQUIRED ATROPHY OF THYROID: ICD-10-CM

## 2023-02-14 DIAGNOSIS — E78.2 MIXED HYPERLIPIDEMIA: ICD-10-CM

## 2023-02-14 DIAGNOSIS — E11.29 TYPE 2 DIABETES MELLITUS WITH MICROALBUMINURIA, WITHOUT LONG-TERM CURRENT USE OF INSULIN (HCC): ICD-10-CM

## 2023-02-14 DIAGNOSIS — N18.2 CKD STAGE G2/A2, GFR 60-89 AND ALBUMIN CREATININE RATIO 30-299 MG/G: ICD-10-CM

## 2023-02-14 DIAGNOSIS — I10 ESSENTIAL HYPERTENSION: ICD-10-CM

## 2023-02-14 DIAGNOSIS — R80.9 TYPE 2 DIABETES MELLITUS WITH MICROALBUMINURIA, WITHOUT LONG-TERM CURRENT USE OF INSULIN (HCC): ICD-10-CM

## 2023-02-14 LAB
BASOPHILS # BLD AUTO: 0.9 % (ref 0–1.8)
BASOPHILS # BLD: 0.03 K/UL (ref 0–0.12)
CHOLEST SERPL-MCNC: 132 MG/DL (ref 100–199)
CORTIS SERPL-MCNC: 10.7 UG/DL (ref 0–23)
CREAT UR-MCNC: 166.74 MG/DL
EOSINOPHIL # BLD AUTO: 0 K/UL (ref 0–0.51)
EOSINOPHIL NFR BLD: 0 % (ref 0–6.9)
ERYTHROCYTE [DISTWIDTH] IN BLOOD BY AUTOMATED COUNT: 46 FL (ref 35.9–50)
FASTING STATUS PATIENT QL REPORTED: NORMAL
HCT VFR BLD AUTO: 35.6 % (ref 37–47)
HDLC SERPL-MCNC: 30 MG/DL
HGB BLD-MCNC: 11.7 G/DL (ref 12–16)
LDLC SERPL CALC-MCNC: 55 MG/DL
LYMPHOCYTES # BLD AUTO: 1.42 K/UL (ref 1–4.8)
LYMPHOCYTES NFR BLD: 43.1 % (ref 22–41)
MANUAL DIFF BLD: NORMAL
MCH RBC QN AUTO: 30.5 PG (ref 27–33)
MCHC RBC AUTO-ENTMCNC: 32.9 G/DL (ref 33.6–35)
MCV RBC AUTO: 92.7 FL (ref 81.4–97.8)
MICROALBUMIN UR-MCNC: 1.5 MG/DL
MICROALBUMIN/CREAT UR: 9 MG/G (ref 0–30)
MONOCYTES # BLD AUTO: 0.14 K/UL (ref 0–0.85)
MONOCYTES NFR BLD AUTO: 4.3 % (ref 0–13.4)
MORPHOLOGY BLD-IMP: NORMAL
NEUTROPHILS # BLD AUTO: 1.71 K/UL (ref 2–7.15)
NEUTROPHILS NFR BLD: 51.7 % (ref 44–72)
NRBC # BLD AUTO: 0 K/UL
NRBC BLD-RTO: 0 /100 WBC
PLATELET # BLD AUTO: 226 K/UL (ref 164–446)
PLATELET BLD QL SMEAR: NORMAL
PMV BLD AUTO: 9.6 FL (ref 9–12.9)
RBC # BLD AUTO: 3.84 M/UL (ref 4.2–5.4)
RBC BLD AUTO: NORMAL
TRIGL SERPL-MCNC: 233 MG/DL (ref 0–149)
TSH SERPL DL<=0.005 MIU/L-ACNC: 4.61 UIU/ML (ref 0.38–5.33)
WBC # BLD AUTO: 3.3 K/UL (ref 4.8–10.8)

## 2023-02-14 PROCEDURE — 80061 LIPID PANEL: CPT

## 2023-02-14 PROCEDURE — 84244 ASSAY OF RENIN: CPT

## 2023-02-14 PROCEDURE — 82570 ASSAY OF URINE CREATININE: CPT

## 2023-02-14 PROCEDURE — 85025 COMPLETE CBC W/AUTO DIFF WBC: CPT

## 2023-02-14 PROCEDURE — 36415 COLL VENOUS BLD VENIPUNCTURE: CPT

## 2023-02-14 PROCEDURE — 82088 ASSAY OF ALDOSTERONE: CPT

## 2023-02-14 PROCEDURE — 84443 ASSAY THYROID STIM HORMONE: CPT

## 2023-02-14 PROCEDURE — 83835 ASSAY OF METANEPHRINES: CPT

## 2023-02-14 PROCEDURE — 85007 BL SMEAR W/DIFF WBC COUNT: CPT

## 2023-02-14 PROCEDURE — 83695 ASSAY OF LIPOPROTEIN(A): CPT

## 2023-02-14 PROCEDURE — 82172 ASSAY OF APOLIPOPROTEIN: CPT

## 2023-02-14 PROCEDURE — 83721 ASSAY OF BLOOD LIPOPROTEIN: CPT | Mod: XU

## 2023-02-14 PROCEDURE — 82533 TOTAL CORTISOL: CPT

## 2023-02-14 PROCEDURE — 82043 UR ALBUMIN QUANTITATIVE: CPT

## 2023-02-15 LAB
APO B100 SERPL-MCNC: 84 MG/DL (ref 55–125)
LDLC SERPL-MCNC: 77 MG/DL (ref 0–129)
LPA SERPL-MCNC: 47 MG/DL

## 2023-02-16 ENCOUNTER — HOSPITAL ENCOUNTER (OUTPATIENT)
Dept: RADIOLOGY | Facility: MEDICAL CENTER | Age: 78
End: 2023-02-16
Attending: FAMILY MEDICINE
Payer: MEDICARE

## 2023-02-16 DIAGNOSIS — Z12.31 VISIT FOR SCREENING MAMMOGRAM: ICD-10-CM

## 2023-02-16 LAB — ALDOST SERPL-MCNC: 8.5 NG/DL

## 2023-02-16 PROCEDURE — 77063 BREAST TOMOSYNTHESIS BI: CPT

## 2023-02-17 ENCOUNTER — OFFICE VISIT (OUTPATIENT)
Dept: MEDICAL GROUP | Facility: PHYSICIAN GROUP | Age: 78
End: 2023-02-17
Payer: MEDICARE

## 2023-02-17 VITALS
HEART RATE: 90 BPM | WEIGHT: 175 LBS | TEMPERATURE: 96 F | BODY MASS INDEX: 29.88 KG/M2 | HEIGHT: 64 IN | SYSTOLIC BLOOD PRESSURE: 112 MMHG | DIASTOLIC BLOOD PRESSURE: 60 MMHG | OXYGEN SATURATION: 100 %

## 2023-02-17 DIAGNOSIS — I10 ESSENTIAL HYPERTENSION: Primary | ICD-10-CM

## 2023-02-17 DIAGNOSIS — F41.9 ANXIETY: ICD-10-CM

## 2023-02-17 LAB
METANEPHS SERPL-SCNC: 0.14 NMOL/L (ref 0–0.49)
NORMETANEPHRINE SERPL-SCNC: 0.9 NMOL/L (ref 0–0.89)

## 2023-02-17 PROCEDURE — 99214 OFFICE O/P EST MOD 30 MIN: CPT | Performed by: FAMILY MEDICINE

## 2023-02-17 ASSESSMENT — ANXIETY QUESTIONNAIRES
4. TROUBLE RELAXING: MORE THAN HALF THE DAYS
GAD7 TOTAL SCORE: 12
5. BEING SO RESTLESS THAT IT IS HARD TO SIT STILL: MORE THAN HALF THE DAYS
3. WORRYING TOO MUCH ABOUT DIFFERENT THINGS: MORE THAN HALF THE DAYS
2. NOT BEING ABLE TO STOP OR CONTROL WORRYING: SEVERAL DAYS
1. FEELING NERVOUS, ANXIOUS, OR ON EDGE: MORE THAN HALF THE DAYS
7. FEELING AFRAID AS IF SOMETHING AWFUL MIGHT HAPPEN: NOT AT ALL
6. BECOMING EASILY ANNOYED OR IRRITABLE: NEARLY EVERY DAY

## 2023-02-17 ASSESSMENT — FIBROSIS 4 INDEX: FIB4 SCORE: 2.25

## 2023-02-17 NOTE — PROGRESS NOTES
"Subjective:     CC: BP    HPI:   Kristin presents today with    Problem   Anxiety    Chronic. She reports since her  has passed she has had anxiety. Since 2018. She will certainly get anxious when she has to measure her blood pressure.     GAD7:  (2023)    Vascular medicine recommended she use RespeRate and working on breathing exercises.     AURELIANO-7 Questionnaire    Feeling nervous, anxious, or on edge: More than half the days  Not being able to sop or control worrying: Several days  Worrying too much about different things: More than half the days  Trouble relaxing: More than half the days  Being so restless that it's hard to sit still: More than half the days  Becoming easily annoyed or irritable: Nearly every day  Feeling afraid as if something awful might happen: Not at all  Total: 12    Interpretation of AURELIANO 7 Total Score   Score Severity :  0-4 No Anxiety   5-9 Mild Anxiety  10-14 Moderate Anxiety  15-21 Severe Anxiety     Essential Hypertension    This is a chronic condition.  Current Meds: telmisartan 80 mg nightly, carvedilol 12.5 mg bid, prazosin if BP>160/100  Side effects: none  Home BP Los-140s/70s-90s (avg 131/82)  Associated symptoms: no cp, no sob, no dizziness/lightheaded    Saw vascular medicine - switched to telmisartan and carvedilol with prazosin as needed if BP >160/100         Health Maintenance: Completed    ROS:  See HPI    Objective:     Exam:  /60 (BP Location: Left arm, Patient Position: Sitting, BP Cuff Size: Large adult)   Pulse 90   Temp (!) 35.6 °C (96 °F) (Temporal)   Ht 1.626 m (5' 4\")   Wt 79.4 kg (175 lb)   SpO2 100%   BMI 30.04 kg/m²  Body mass index is 30.04 kg/m².    Physical Exam  Constitutional:       Appearance: Normal appearance.   Cardiovascular:      Rate and Rhythm: Normal rate and regular rhythm.      Heart sounds: Normal heart sounds.   Pulmonary:      Effort: Pulmonary effort is normal.      Breath sounds: Normal breath sounds. "   Musculoskeletal:      Cervical back: Normal range of motion and neck supple.   Neurological:      Mental Status: She is alert.       Assessment & Plan:     77 y.o. female with the following -     1. Essential hypertension  Chronic, stable.  At her last visit I ordered a 24-hour ambulatory blood pressure monitor.  Somehow she ended up establishing with vascular medicine and she states she did rather just manage her blood pressure with me.  They state her goal should be under 130/80 due to history of diabetes which I think is reasonable.  Blood pressure in the office today is at goal.  I suspect a large component of her elevated blood pressures in the recent past were due to uncontrolled anxiety.  Vascular medicine to change her regimen and we will continue that regimen for now.  - Telmisartan 80 mg nightly  - Carvedilol 12.5 mg twice daily  - Prazosin 1 mg 3 times daily as needed blood pressures over 160/100    2. Anxiety  Chronic, uncontrolled.  She continues to show moderate levels of anxiety based on a AURELIANO-7 questionnaire.  Vascular medicine did go through some respiratory exercises with her and after extensive discussion today she is decided she would like to work on the respiratory exercises first before discussing medication for anxiety.  - Try the RespeRate and the 4 - 7 - 8 breathing technique  - If anxiety still uncontrolled at next visit, we could consider citalopram or Paxil as they are known activating    Return in about 4 weeks (around 3/17/2023) for Anxiety, GAD7.    Please note that this dictation was created using voice recognition software. I have made every reasonable attempt to correct obvious errors, but I expect that there are errors of grammar and possibly content that I did not discover before finalizing the note.

## 2023-02-17 NOTE — PATIENT INSTRUCTIONS
"LIFESTYLE FACTORS TO HELP WITH BLOOD PRESSURE    Lifestyle factors to help manage blood pressure:  1) reduce stress with daily activity, consider yoga, breathing exercises (like 4-7-8 breathing technique)   2) reduce sodium to <2000 mg/day  3) DASH diet     4) 200-300 min/week cardio, which you can build up to.       Please check out \"life's essential 8\" at [email or web address]   " Assessment/Plan:    No problem-specific Assessment & Plan notes found for this encounter  {Assess/PlanSmartLinks:38938}      Subjective: New patient here for a physical exam     Health Maintenance Due   Topic Date Due    BMI: Followup Plan  01/13/2018    INFLUENZA VACCINE  07/01/2019          Patient ID: Freya Gonzales is a 21 y o  female  HPI    {Common ambulatory SmartLinks:64891}    Review of Systems      Objective: There were no vitals taken for this visit  Physical Exam    Subjective:      History was provided by the {relatives:28771}  Freya Gonzales is a 21 y o  female who is here for this well child visit  Immunization History   Administered Date(s) Administered    DTP 2000, 2000, 2000, 05/31/2001, 03/23/2005    H1N1, All Formulations 10/27/2009    HPV Quadrivalent 06/28/2011, 11/17/2011, 11/19/2012    Hep A, ped/adol, 2 dose 07/26/2017    Hep B, adult 2000, 2000, 05/31/2001    Hib (PRP-OMP) 2000, 2000, 2000, 10/29/2011    IPV 2000, 2000, 05/23/2001, 03/23/2005    Influenza Quadrivalent Preservative Free 3 years and older IM 01/04/2016    Influenza TIV (IM) 11/17/2011, 11/19/2012, 11/26/2013, 11/18/2014    MMR 05/31/2001, 03/23/2005    Meningococcal, Unknown Serogroups 11/17/2011, 07/26/2017    Pneumococcal Conjugate PCV 7 2000, 2000, 2000, 05/31/2001    Tdap 11/07/2003    Tuberculin Skin Test-PPD Intradermal 11/07/2003    Varicella 10/29/2001, 08/21/2008     {Common ambulatory SmartLinks:91946}    @[id number]@    Objective: There were no vitals filed for this visit  Growth parameters are noted and {are:36518::"are"} appropriate for age      General:   {general exam:62529}   Gait:   {normal/abnormal***:76704::"normal"}   Skin:   {skin brief exam:104}   Oral cavity:   {oropharynx exam:78680::"lips, mucosa, and tongue normal; teeth and gums normal"}   Eyes:   {eye [facility]:02478::"XQIBEPB white","pupils equal and reactive","red reflex normal bilaterally"}   Ears:   {ear tm:25523}   Neck:   {neck exam:87480::"no adenopathy","no carotid bruit","no JVD","supple, symmetrical, trachea midline","thyroid not enlarged, symmetric, no tenderness/mass/nodules"}   Lungs:  {lung exam:31351::"clear to auscultation bilaterally"}   Heart:   {heart exam:5510::"regular rate and rhythm, S1, S2 normal, no murmur, click, rub or gallop"}   Abdomen:  {abdomen exam:25475::"soft, non-tender; bowel sounds normal; no masses,  no organomegaly"}   :  {genital exam:66839::"exam deferred"}   Davy Stage:   ***   Extremities:  {extremity exam:5109::"extremities normal, warm and well-perfused; no cyanosis, clubbing, or edema"}   Neuro:  {neuro exam:5902::"normal without focal findings","mental status, speech normal, alert and oriented x3","ERIKA","reflexes normal and symmetric"}        Assessment:     Well adolescent  Plan:      1  Anticipatory guidance discussed  {guidance:81635}    2  Weight management:  The patient was counseled regarding {PED MU OBESITY COUNSELIN}  3  Development: {desc; development appropriate/delayed:}    4  Immunizations today: per orders  History of previous adverse reactions to immunizations? {yes***/no:32432::"no"}    5  Follow-up visit in {1-6:56426::"1"} {week/month/year:::"year"} for next well child visit, or sooner as needed

## 2023-02-19 LAB — RENIN PLAS-CCNC: 0.2 NG/ML/HR

## 2023-03-15 ENCOUNTER — OFFICE VISIT (OUTPATIENT)
Dept: MEDICAL GROUP | Facility: PHYSICIAN GROUP | Age: 78
End: 2023-03-15
Payer: MEDICARE

## 2023-03-15 VITALS
WEIGHT: 175.4 LBS | TEMPERATURE: 97.1 F | OXYGEN SATURATION: 99 % | HEIGHT: 64 IN | HEART RATE: 90 BPM | DIASTOLIC BLOOD PRESSURE: 82 MMHG | BODY MASS INDEX: 29.94 KG/M2 | SYSTOLIC BLOOD PRESSURE: 136 MMHG

## 2023-03-15 DIAGNOSIS — F41.9 ANXIETY: ICD-10-CM

## 2023-03-15 DIAGNOSIS — I10 ESSENTIAL HYPERTENSION: ICD-10-CM

## 2023-03-15 DIAGNOSIS — I49.9 IRREGULAR HEART BEAT: ICD-10-CM

## 2023-03-15 PROCEDURE — 99214 OFFICE O/P EST MOD 30 MIN: CPT | Performed by: FAMILY MEDICINE

## 2023-03-15 PROCEDURE — 93000 ELECTROCARDIOGRAM COMPLETE: CPT | Performed by: FAMILY MEDICINE

## 2023-03-15 ASSESSMENT — ANXIETY QUESTIONNAIRES
6. BECOMING EASILY ANNOYED OR IRRITABLE: MORE THAN HALF THE DAYS
1. FEELING NERVOUS, ANXIOUS, OR ON EDGE: SEVERAL DAYS
4. TROUBLE RELAXING: SEVERAL DAYS
7. FEELING AFRAID AS IF SOMETHING AWFUL MIGHT HAPPEN: NOT AT ALL
GAD7 TOTAL SCORE: 7
5. BEING SO RESTLESS THAT IT IS HARD TO SIT STILL: NOT AT ALL
3. WORRYING TOO MUCH ABOUT DIFFERENT THINGS: MORE THAN HALF THE DAYS
2. NOT BEING ABLE TO STOP OR CONTROL WORRYING: SEVERAL DAYS

## 2023-03-15 ASSESSMENT — FIBROSIS 4 INDEX: FIB4 SCORE: 2.25

## 2023-03-15 NOTE — PROGRESS NOTES
"Subjective:     CC: anxiety    HPI:   Kristin presents today with    Problem   Anxiety    Chronic. She reports since her  has passed in 2018 she has had anxiety. She will certainly get anxious when she has to measure her blood pressure.     GAD7: 12/21 (2/2023); 11/21 (1/2023)    Vascular medicine recommended she use RespeRate and working on breathing exercises. Still worries. Been using RespeRate last 2 weeks.     AURELIANO-7 Questionnaire    Feeling nervous, anxious, or on edge: Several days  Not being able to sop or control worrying: Several days  Worrying too much about different things: More than half the days  Trouble relaxing: Several days  Being so restless that it's hard to sit still: Not at all  Becoming easily annoyed or irritable: More than half the days  Feeling afraid as if something awful might happen: Not at all  Total: 7    Interpretation of AURELIANO 7 Total Score   Score Severity :  0-4 No Anxiety   5-9 Mild Anxiety  10-14 Moderate Anxiety  15-21 Severe Anxiety     Essential Hypertension    This is a chronic condition.  Current Meds: telmisartan 80 mg nightly, carvedilol 12.5 mg bid, prazosin if BP>160/100  Side effects: none  Home BP Log: none  Associated symptoms: no cp, no sob, no dizziness/lightheaded    Saw vascular medicine - switched to telmisartan and carvedilol with prazosin as needed if BP >160/100           Health Maintenance: Completed    ROS:  See HPI    Objective:     Exam:  /82 (BP Location: Left arm, Patient Position: Sitting, BP Cuff Size: Adult)   Pulse 90   Temp 36.2 °C (97.1 °F) (Temporal)   Ht 1.626 m (5' 4\")   Wt 79.6 kg (175 lb 6.4 oz)   SpO2 99%   BMI 30.11 kg/m²  Body mass index is 30.11 kg/m².    Physical Exam  Constitutional:       Appearance: Normal appearance.   Cardiovascular:      Rate and Rhythm: Normal rate. Rhythm irregularly irregular.      Heart sounds: Normal heart sounds. No murmur heard.  Pulmonary:      Effort: Pulmonary effort is normal.      Breath " HUB RELAYED MESSAGE    sounds: Normal breath sounds.   Neurological:      Mental Status: She is alert.     EKG Interpretation   Ordered and interpreted by Hamida Hooper MD  Rhythm: normal sinus   Rate: normal   Axis: normal   Ectopy: PVCs  Conduction: normal   ST Segments: no acute change   T Waves: no acute change   Q Waves: none   Clinical Impression: no acute changes and normal EKG    Assessment & Plan:     77 y.o. female with the following -     1. Anxiety  Chronic, improved.  She has been working on her breathing exercises and been using the RespeRate.  AURELIANO-7 questionnaire today shows almost 50% improvement in her symptoms so she would rather not start medication.  I did offer therapy as she admitted that she has a tendency to dwell on negative thoughts but she declined.  We will continue to monitor.    2. Essential hypertension  Chronic, controlled.  Blood pressure is at goal under 140/90 in the office today.  We will continue the current regimen.  - Carvedilol 12.5 mg twice daily  - Telmisartan 80 mg daily  - Prazosin 1 mg as needed if blood pressure goes above 160/100    3. Irregular heart beat  New diagnosis.  On exam today she was noted to have an irregularly irregular heart rhythm.  EKG shows that she is getting PVCs which explained her irregular rhythm on exam.  I explained the etiology of PVCs and associated symptoms.  Reassurance provided.  Return precautions were discussed if she started noting heart palpitations during exertion/exercise.  - EKG - Clinic Performed    Return in about 6 months (around 9/15/2023) for Medicare Wellness.    Please note that this dictation was created using voice recognition software. I have made every reasonable attempt to correct obvious errors, but I expect that there are errors of grammar and possibly content that I did not discover before finalizing the note.

## 2023-04-01 DIAGNOSIS — E03.4 HYPOTHYROIDISM DUE TO ACQUIRED ATROPHY OF THYROID: ICD-10-CM

## 2023-04-03 RX ORDER — LEVOTHYROXINE SODIUM 0.07 MG/1
TABLET ORAL
Qty: 90 TABLET | Refills: 3 | Status: SHIPPED | OUTPATIENT
Start: 2023-04-03 | End: 2024-02-20

## 2023-07-20 ENCOUNTER — HOSPITAL ENCOUNTER (OUTPATIENT)
Dept: CARDIOLOGY | Facility: MEDICAL CENTER | Age: 78
End: 2023-07-20
Attending: UROLOGY
Payer: MEDICARE

## 2023-07-20 DIAGNOSIS — Z01.810 PRE-OPERATIVE CARDIOVASCULAR EXAMINATION: ICD-10-CM

## 2023-07-20 LAB — EKG IMPRESSION: NORMAL

## 2023-07-20 PROCEDURE — 93005 ELECTROCARDIOGRAM TRACING: CPT

## 2023-07-20 PROCEDURE — 93010 ELECTROCARDIOGRAM REPORT: CPT | Performed by: INTERNAL MEDICINE

## 2023-07-24 ENCOUNTER — HOSPITAL ENCOUNTER (OUTPATIENT)
Dept: LAB | Facility: MEDICAL CENTER | Age: 78
End: 2023-07-24
Attending: UROLOGY
Payer: MEDICARE

## 2023-07-24 LAB
ANION GAP SERPL CALC-SCNC: 11 MMOL/L (ref 7–16)
ANISOCYTOSIS BLD QL SMEAR: ABNORMAL
APPEARANCE UR: CLEAR
APTT PPP: 45 SEC (ref 24.7–36)
BASOPHILS # BLD AUTO: 0 % (ref 0–1.8)
BASOPHILS # BLD: 0 K/UL (ref 0–0.12)
BILIRUB UR QL STRIP.AUTO: NEGATIVE
BUN SERPL-MCNC: 16 MG/DL (ref 8–22)
CALCIUM SERPL-MCNC: 9.6 MG/DL (ref 8.5–10.5)
CHLORIDE SERPL-SCNC: 103 MMOL/L (ref 96–112)
CO2 SERPL-SCNC: 24 MMOL/L (ref 20–33)
COLOR UR: YELLOW
CREAT SERPL-MCNC: 0.9 MG/DL (ref 0.5–1.4)
EOSINOPHIL # BLD AUTO: 0.03 K/UL (ref 0–0.51)
EOSINOPHIL NFR BLD: 0.9 % (ref 0–6.9)
ERYTHROCYTE [DISTWIDTH] IN BLOOD BY AUTOMATED COUNT: 46.3 FL (ref 35.9–50)
GFR SERPLBLD CREATININE-BSD FMLA CKD-EPI: 65 ML/MIN/1.73 M 2
GLUCOSE SERPL-MCNC: 174 MG/DL (ref 65–99)
GLUCOSE UR STRIP.AUTO-MCNC: NEGATIVE MG/DL
HCT VFR BLD AUTO: 34.2 % (ref 37–47)
HGB BLD-MCNC: 11.3 G/DL (ref 12–16)
INR PPP: 1.08 (ref 0.87–1.13)
KETONES UR STRIP.AUTO-MCNC: ABNORMAL MG/DL
LEUKOCYTE ESTERASE UR QL STRIP.AUTO: NEGATIVE
LYMPHOCYTES # BLD AUTO: 1.03 K/UL (ref 1–4.8)
LYMPHOCYTES NFR BLD: 34.3 % (ref 22–41)
MANUAL DIFF BLD: NORMAL
MCH RBC QN AUTO: 30.3 PG (ref 27–33)
MCHC RBC AUTO-ENTMCNC: 33 G/DL (ref 32.2–35.5)
MCV RBC AUTO: 91.7 FL (ref 81.4–97.8)
MICRO URNS: ABNORMAL
MICROCYTES BLD QL SMEAR: ABNORMAL
MONOCYTES # BLD AUTO: 0.05 K/UL (ref 0–0.85)
MONOCYTES NFR BLD AUTO: 1.8 % (ref 0–13.4)
MORPHOLOGY BLD-IMP: NORMAL
NEUTROPHILS # BLD AUTO: 1.89 K/UL (ref 1.82–7.42)
NEUTROPHILS NFR BLD: 63 % (ref 44–72)
NITRITE UR QL STRIP.AUTO: NEGATIVE
NRBC # BLD AUTO: 0 K/UL
NRBC BLD-RTO: 0 /100 WBC (ref 0–0.2)
OVALOCYTES BLD QL SMEAR: NORMAL
PH UR STRIP.AUTO: 6 [PH] (ref 5–8)
PLATELET # BLD AUTO: 179 K/UL (ref 164–446)
PLATELET BLD QL SMEAR: NORMAL
PMV BLD AUTO: 9.6 FL (ref 9–12.9)
POIKILOCYTOSIS BLD QL SMEAR: NORMAL
POTASSIUM SERPL-SCNC: 4.4 MMOL/L (ref 3.6–5.5)
PROT UR QL STRIP: NEGATIVE MG/DL
PROTHROMBIN TIME: 13.9 SEC (ref 12–14.6)
RBC # BLD AUTO: 3.73 M/UL (ref 4.2–5.4)
RBC BLD AUTO: PRESENT
RBC UR QL AUTO: NEGATIVE
SODIUM SERPL-SCNC: 138 MMOL/L (ref 135–145)
SP GR UR STRIP.AUTO: 1.02
UROBILINOGEN UR STRIP.AUTO-MCNC: 0.2 MG/DL
WBC # BLD AUTO: 3 K/UL (ref 4.8–10.8)

## 2023-07-24 PROCEDURE — 81003 URINALYSIS AUTO W/O SCOPE: CPT

## 2023-07-24 PROCEDURE — 36415 COLL VENOUS BLD VENIPUNCTURE: CPT

## 2023-07-24 PROCEDURE — 87086 URINE CULTURE/COLONY COUNT: CPT | Mod: GA

## 2023-07-24 PROCEDURE — 85610 PROTHROMBIN TIME: CPT

## 2023-07-24 PROCEDURE — 85025 COMPLETE CBC W/AUTO DIFF WBC: CPT

## 2023-07-24 PROCEDURE — 85007 BL SMEAR W/DIFF WBC COUNT: CPT

## 2023-07-24 PROCEDURE — 80048 BASIC METABOLIC PNL TOTAL CA: CPT

## 2023-07-24 PROCEDURE — 85730 THROMBOPLASTIN TIME PARTIAL: CPT

## 2023-07-26 LAB
BACTERIA UR CULT: NORMAL
SIGNIFICANT IND 70042: NORMAL
SITE SITE: NORMAL
SOURCE SOURCE: NORMAL

## 2023-09-15 ENCOUNTER — APPOINTMENT (OUTPATIENT)
Dept: MEDICAL GROUP | Facility: PHYSICIAN GROUP | Age: 78
End: 2023-09-15
Payer: MEDICARE

## 2023-09-18 DIAGNOSIS — K21.9 GASTROESOPHAGEAL REFLUX DISEASE WITHOUT ESOPHAGITIS: ICD-10-CM

## 2023-09-19 RX ORDER — OMEPRAZOLE 20 MG/1
CAPSULE, DELAYED RELEASE ORAL
Qty: 180 CAPSULE | Refills: 3 | Status: SHIPPED | OUTPATIENT
Start: 2023-09-19

## 2023-09-25 ENCOUNTER — HOSPITAL ENCOUNTER (OUTPATIENT)
Dept: LAB | Facility: MEDICAL CENTER | Age: 78
End: 2023-09-25
Attending: FAMILY MEDICINE
Payer: MEDICARE

## 2023-09-25 DIAGNOSIS — R19.4 CHANGE IN BOWEL HABITS: ICD-10-CM

## 2023-09-25 LAB
ANION GAP SERPL CALC-SCNC: 12 MMOL/L (ref 7–16)
BUN SERPL-MCNC: 16 MG/DL (ref 8–22)
CALCIUM SERPL-MCNC: 9.8 MG/DL (ref 8.5–10.5)
CHLORIDE SERPL-SCNC: 107 MMOL/L (ref 96–112)
CO2 SERPL-SCNC: 24 MMOL/L (ref 20–33)
CREAT SERPL-MCNC: 0.91 MG/DL (ref 0.5–1.4)
GFR SERPLBLD CREATININE-BSD FMLA CKD-EPI: 64 ML/MIN/1.73 M 2
GLUCOSE SERPL-MCNC: 137 MG/DL (ref 65–99)
POTASSIUM SERPL-SCNC: 4.6 MMOL/L (ref 3.6–5.5)
SODIUM SERPL-SCNC: 143 MMOL/L (ref 135–145)

## 2023-09-25 PROCEDURE — 36415 COLL VENOUS BLD VENIPUNCTURE: CPT

## 2023-09-25 PROCEDURE — 80048 BASIC METABOLIC PNL TOTAL CA: CPT

## 2023-09-26 SDOH — ECONOMIC STABILITY: FOOD INSECURITY: WITHIN THE PAST 12 MONTHS, YOU WORRIED THAT YOUR FOOD WOULD RUN OUT BEFORE YOU GOT MONEY TO BUY MORE.: NEVER TRUE

## 2023-09-26 SDOH — ECONOMIC STABILITY: HOUSING INSECURITY: IN THE LAST 12 MONTHS, HOW MANY PLACES HAVE YOU LIVED?: 1

## 2023-09-26 SDOH — HEALTH STABILITY: PHYSICAL HEALTH: ON AVERAGE, HOW MANY MINUTES DO YOU ENGAGE IN EXERCISE AT THIS LEVEL?: 80 MIN

## 2023-09-26 SDOH — ECONOMIC STABILITY: TRANSPORTATION INSECURITY
IN THE PAST 12 MONTHS, HAS LACK OF TRANSPORTATION KEPT YOU FROM MEETINGS, WORK, OR FROM GETTING THINGS NEEDED FOR DAILY LIVING?: NO

## 2023-09-26 SDOH — HEALTH STABILITY: PHYSICAL HEALTH: ON AVERAGE, HOW MANY DAYS PER WEEK DO YOU ENGAGE IN MODERATE TO STRENUOUS EXERCISE (LIKE A BRISK WALK)?: 7 DAYS

## 2023-09-26 SDOH — HEALTH STABILITY: MENTAL HEALTH
STRESS IS WHEN SOMEONE FEELS TENSE, NERVOUS, ANXIOUS, OR CAN'T SLEEP AT NIGHT BECAUSE THEIR MIND IS TROUBLED. HOW STRESSED ARE YOU?: TO SOME EXTENT

## 2023-09-26 SDOH — ECONOMIC STABILITY: FOOD INSECURITY: WITHIN THE PAST 12 MONTHS, THE FOOD YOU BOUGHT JUST DIDN'T LAST AND YOU DIDN'T HAVE MONEY TO GET MORE.: NEVER TRUE

## 2023-09-26 SDOH — ECONOMIC STABILITY: INCOME INSECURITY: IN THE LAST 12 MONTHS, WAS THERE A TIME WHEN YOU WERE NOT ABLE TO PAY THE MORTGAGE OR RENT ON TIME?: NO

## 2023-09-26 ASSESSMENT — SOCIAL DETERMINANTS OF HEALTH (SDOH)
HOW OFTEN DO YOU HAVE SIX OR MORE DRINKS ON ONE OCCASION: NEVER
HOW OFTEN DO YOU HAVE A DRINK CONTAINING ALCOHOL: NEVER
HOW OFTEN DO YOU ATTENT MEETINGS OF THE CLUB OR ORGANIZATION YOU BELONG TO?: MORE THAN 4 TIMES PER YEAR
HOW OFTEN DO YOU ATTEND CHURCH OR RELIGIOUS SERVICES?: PATIENT DECLINED
WITHIN THE PAST 12 MONTHS, YOU WORRIED THAT YOUR FOOD WOULD RUN OUT BEFORE YOU GOT THE MONEY TO BUY MORE: NEVER TRUE
HOW OFTEN DO YOU ATTEND CHURCH OR RELIGIOUS SERVICES?: PATIENT DECLINED
HOW OFTEN DO YOU ATTENT MEETINGS OF THE CLUB OR ORGANIZATION YOU BELONG TO?: MORE THAN 4 TIMES PER YEAR
DO YOU BELONG TO ANY CLUBS OR ORGANIZATIONS SUCH AS CHURCH GROUPS UNIONS, FRATERNAL OR ATHLETIC GROUPS, OR SCHOOL GROUPS?: YES
IN A TYPICAL WEEK, HOW MANY TIMES DO YOU TALK ON THE PHONE WITH FAMILY, FRIENDS, OR NEIGHBORS?: MORE THAN THREE TIMES A WEEK
HOW OFTEN DO YOU GET TOGETHER WITH FRIENDS OR RELATIVES?: THREE TIMES A WEEK
HOW MANY DRINKS CONTAINING ALCOHOL DO YOU HAVE ON A TYPICAL DAY WHEN YOU ARE DRINKING: PATIENT DOES NOT DRINK
HOW OFTEN DO YOU GET TOGETHER WITH FRIENDS OR RELATIVES?: THREE TIMES A WEEK
DO YOU BELONG TO ANY CLUBS OR ORGANIZATIONS SUCH AS CHURCH GROUPS UNIONS, FRATERNAL OR ATHLETIC GROUPS, OR SCHOOL GROUPS?: YES
IN A TYPICAL WEEK, HOW MANY TIMES DO YOU TALK ON THE PHONE WITH FAMILY, FRIENDS, OR NEIGHBORS?: MORE THAN THREE TIMES A WEEK

## 2023-09-26 ASSESSMENT — LIFESTYLE VARIABLES
HOW OFTEN DO YOU HAVE A DRINK CONTAINING ALCOHOL: NEVER
HOW MANY STANDARD DRINKS CONTAINING ALCOHOL DO YOU HAVE ON A TYPICAL DAY: PATIENT DOES NOT DRINK
SKIP TO QUESTIONS 9-10: 1
HOW OFTEN DO YOU HAVE SIX OR MORE DRINKS ON ONE OCCASION: NEVER
AUDIT-C TOTAL SCORE: 0

## 2023-09-29 ENCOUNTER — HOSPITAL ENCOUNTER (OUTPATIENT)
Facility: MEDICAL CENTER | Age: 78
End: 2023-09-29
Attending: FAMILY MEDICINE
Payer: MEDICARE

## 2023-09-29 ENCOUNTER — OFFICE VISIT (OUTPATIENT)
Dept: MEDICAL GROUP | Facility: PHYSICIAN GROUP | Age: 78
End: 2023-09-29
Payer: MEDICARE

## 2023-09-29 VITALS
DIASTOLIC BLOOD PRESSURE: 74 MMHG | TEMPERATURE: 97.1 F | HEIGHT: 64 IN | HEART RATE: 84 BPM | BODY MASS INDEX: 28.38 KG/M2 | SYSTOLIC BLOOD PRESSURE: 136 MMHG | WEIGHT: 166.2 LBS | OXYGEN SATURATION: 96 %

## 2023-09-29 DIAGNOSIS — R19.4 CHANGE IN BOWEL HABITS: ICD-10-CM

## 2023-09-29 DIAGNOSIS — I10 ESSENTIAL HYPERTENSION: ICD-10-CM

## 2023-09-29 DIAGNOSIS — K21.9 GASTROESOPHAGEAL REFLUX DISEASE WITHOUT ESOPHAGITIS: ICD-10-CM

## 2023-09-29 DIAGNOSIS — N18.2 CKD STAGE G2/A2, GFR 60-89 AND ALBUMIN CREATININE RATIO 30-299 MG/G: ICD-10-CM

## 2023-09-29 DIAGNOSIS — N39.0 RECURRENT UTI: ICD-10-CM

## 2023-09-29 DIAGNOSIS — Z11.59 NEED FOR HEPATITIS C SCREENING TEST: ICD-10-CM

## 2023-09-29 DIAGNOSIS — K44.9 HIATAL HERNIA: ICD-10-CM

## 2023-09-29 DIAGNOSIS — Z00.00 ENCOUNTER FOR MEDICARE ANNUAL WELLNESS EXAM: ICD-10-CM

## 2023-09-29 DIAGNOSIS — R80.9 TYPE 2 DIABETES MELLITUS WITH MICROALBUMINURIA, WITHOUT LONG-TERM CURRENT USE OF INSULIN (HCC): ICD-10-CM

## 2023-09-29 DIAGNOSIS — Z23 NEED FOR VACCINATION: ICD-10-CM

## 2023-09-29 DIAGNOSIS — D64.9 ANEMIA, UNSPECIFIED TYPE: ICD-10-CM

## 2023-09-29 DIAGNOSIS — H91.93 BILATERAL HEARING LOSS, UNSPECIFIED HEARING LOSS TYPE: ICD-10-CM

## 2023-09-29 DIAGNOSIS — E03.4 HYPOTHYROIDISM DUE TO ACQUIRED ATROPHY OF THYROID: ICD-10-CM

## 2023-09-29 DIAGNOSIS — I70.0 ATHEROSCLEROSIS OF AORTA (HCC): ICD-10-CM

## 2023-09-29 DIAGNOSIS — E78.2 MIXED HYPERLIPIDEMIA: ICD-10-CM

## 2023-09-29 DIAGNOSIS — G47.33 OBSTRUCTIVE SLEEP APNEA SYNDROME: ICD-10-CM

## 2023-09-29 DIAGNOSIS — E11.29 TYPE 2 DIABETES MELLITUS WITH MICROALBUMINURIA, WITHOUT LONG-TERM CURRENT USE OF INSULIN (HCC): ICD-10-CM

## 2023-09-29 DIAGNOSIS — N32.81 OVERACTIVE BLADDER: ICD-10-CM

## 2023-09-29 PROBLEM — H01.9 DERMATITIS OF EYELID OF LEFT EYE: Status: RESOLVED | Noted: 2021-08-23 | Resolved: 2023-09-29

## 2023-09-29 PROBLEM — N32.89 MASS OF URINARY BLADDER: Status: RESOLVED | Noted: 2021-05-28 | Resolved: 2023-09-29

## 2023-09-29 PROBLEM — M79.673 FOOT PAIN: Status: RESOLVED | Noted: 2021-08-13 | Resolved: 2023-09-29

## 2023-09-29 LAB
APPEARANCE UR: NORMAL
BILIRUB UR STRIP-MCNC: NEGATIVE MG/DL
COLOR UR AUTO: YELLOW
GLUCOSE UR STRIP.AUTO-MCNC: NEGATIVE MG/DL
HBA1C MFR BLD: 6.3 % (ref ?–5.8)
KETONES UR STRIP.AUTO-MCNC: NEGATIVE MG/DL
LEUKOCYTE ESTERASE UR QL STRIP.AUTO: NEGATIVE
NITRITE UR QL STRIP.AUTO: NEGATIVE
PH UR STRIP.AUTO: 5 [PH] (ref 5–8)
POCT INT CON NEG: NEGATIVE
POCT INT CON POS: POSITIVE
PROT UR QL STRIP: NEGATIVE MG/DL
RBC UR QL AUTO: NEGATIVE
SP GR UR STRIP.AUTO: 1
UROBILINOGEN UR STRIP-MCNC: 0.2 MG/DL

## 2023-09-29 PROCEDURE — 83036 HEMOGLOBIN GLYCOSYLATED A1C: CPT | Performed by: FAMILY MEDICINE

## 2023-09-29 PROCEDURE — G0008 ADMIN INFLUENZA VIRUS VAC: HCPCS | Performed by: FAMILY MEDICINE

## 2023-09-29 PROCEDURE — G0439 PPPS, SUBSEQ VISIT: HCPCS | Performed by: FAMILY MEDICINE

## 2023-09-29 PROCEDURE — 3078F DIAST BP <80 MM HG: CPT | Performed by: FAMILY MEDICINE

## 2023-09-29 PROCEDURE — 87086 URINE CULTURE/COLONY COUNT: CPT

## 2023-09-29 PROCEDURE — 3075F SYST BP GE 130 - 139MM HG: CPT | Performed by: FAMILY MEDICINE

## 2023-09-29 PROCEDURE — 99214 OFFICE O/P EST MOD 30 MIN: CPT | Mod: 25 | Performed by: FAMILY MEDICINE

## 2023-09-29 PROCEDURE — 81002 URINALYSIS NONAUTO W/O SCOPE: CPT | Performed by: FAMILY MEDICINE

## 2023-09-29 PROCEDURE — 90662 IIV NO PRSV INCREASED AG IM: CPT | Performed by: FAMILY MEDICINE

## 2023-09-29 ASSESSMENT — PATIENT HEALTH QUESTIONNAIRE - PHQ9: CLINICAL INTERPRETATION OF PHQ2 SCORE: 0

## 2023-09-29 ASSESSMENT — ACTIVITIES OF DAILY LIVING (ADL): BATHING_REQUIRES_ASSISTANCE: 0

## 2023-09-29 ASSESSMENT — FIBROSIS 4 INDEX: FIB4 SCORE: 2.84

## 2023-09-29 ASSESSMENT — ENCOUNTER SYMPTOMS: GENERAL WELL-BEING: FAIR

## 2023-09-29 NOTE — PROGRESS NOTES
Chief Complaint   Patient presents with    Medicare Annual Wellness       HPI:  Kristin Barrios is a 77 y.o. here for Medicare Annual Wellness Visit     Patient Active Problem List    Diagnosis Date Noted    Anxiety 01/30/2023    CKD stage G2/A2, GFR 60-89 and albumin creatinine ratio  mg/g 01/11/2023    Change in bowel habits 12/08/2022    Palpitations 04/11/2022    Seasonal allergies 03/25/2022    Overactive bladder 02/17/2022    Eczema 08/23/2021    Recurrent UTI 02/09/2021    Type 2 diabetes mellitus (HCC) 12/01/2020    Essential hypertension 12/01/2020    Mixed hyperlipidemia 12/01/2020    Hypothyroidism due to acquired atrophy of thyroid 12/01/2020    Gastroesophageal reflux disease without esophagitis 12/01/2020    Hiatal hernia 11/09/2020    History of basal cell carcinoma (BCC) 04/04/2019    Atherosclerosis of aorta (HCC) 08/25/2015    Diverticulosis of colon 09/09/2013    Sleep apnea 07/03/2012    Tinnitus of both ears 10/03/2006       Current Outpatient Medications   Medication Sig Dispense Refill    omeprazole (PRILOSEC) 20 MG delayed-release capsule TAKE 1 CAPSULE BY MOUTH  TWICE DAILY 180 Capsule 3    atorvastatin (LIPITOR) 40 MG Tab TAKE 1 TABLET BY MOUTH  DAILY 90 Tablet 3    metFORMIN ER (GLUCOPHAGE XR) 500 MG TABLET SR 24 HR TAKE 2 TABLETS BY MOUTH IN  THE MORNING AND 1 TABLET BY MOUTH IN THE EVENING 270 Tablet 3    levothyroxine (SYNTHROID) 75 MCG Tab TAKE 1 TABLET BY MOUTH IN THE  MORNING ON AN EMPTY STOMACH 90 Tablet 3    prazosin (MINIPRESS) 1 MG Cap TAKE 1 CAPSULE BY MOUTH 3 TIMES  DAILY AS NEEDED FOR BLOOD  PRESSURE SPIKES, IF SBP&gt;160 OR  DBP &gt;100 FOR UP TO 90 DAYS 200 Capsule 0    telmisartan (MICARDIS) 80 MG Tab Take 1 Tablet by mouth at bedtime. 90 Tablet 3    carvedilol (COREG) 12.5 MG Tab Take 1 Tablet by mouth 2 times a day with meals for 360 days. 180 Tablet 3    magnesium oxide (MAG-OX) 400 MG Tab tablet Take 400 mg by mouth 2 times a day.      Ferrous Sulfate (IRON PO)  Take 1 tablet by mouth every day. OTC       No current facility-administered medications for this visit.          Current supplements as per medication list.     Allergies: Lisinopril and Warfarin    Current social contact/activities: visiting with friends/ knitting group      She  reports that she has never smoked. She has never used smokeless tobacco. She reports that she does not currently use alcohol. She reports that she does not currently use drugs.  Counseling given: Yes      ROS:    Gait: Uses no assistive device  Ostomy: No  Other tubes: No  Amputations: No  Chronic oxygen use: no  Last eye exam: about 5 days ago   Wears hearing aids: No   : Denies any urinary leakage during the last 6 months    Screening:  Depression Screening  Little interest or pleasure in doing things?  0 - not at all  Feeling down, depressed , or hopeless? 0 - not at all  Trouble falling or staying asleep, or sleeping too much?     Feeling tired or having little energy?     Poor appetite or overeating?     Feeling bad about yourself - or that you are a failure or have let yourself or your family down?    Trouble concentrating on things, such as reading the newspaper or watching television?    Moving or speaking so slowly that other people could have noticed.  Or the opposite - being so fidgety or restless that you have been moving around a lot more than usual?     Thoughts that you would be better off dead, or of hurting yourself?     Patient Health Questionnaire Score:      If depressive symptoms identified deferred to follow up visit unless specifically addressed in assessment and plan.    Interpretation of PHQ-9 Total Score   Score Severity   1-4 No Depression   5-9 Mild Depression   10-14 Moderate Depression   15-19 Moderately Severe Depression   20-27 Severe Depression    Screening for Cognitive Impairment  Do you or any of your friends or family members have any concern about your memory? No  Three Minute Recall (Banana,  Maxwell Caldwell 3/3    Jimmy clock face with all 12 numbers and set the hands to show 20 past 8.  Yes    Cognitive concerns identified deferred for follow up unless specifically addressed in assessment and plan.    Fall Risk Assessment  Has the patient had two or more falls in the last year or any fall with injury in the last year?  No    Safety Assessment  Do you always wear your seatbelt?  Yes  Any changes to home needed to function safely? No  Difficulty hearing.  No  Patient counseled about all safety risks that were identified.    Functional Assessment ADLs  Are there any barriers preventing you from cooking for yourself or meeting nutritional needs?  No.    Are there any barriers preventing you from driving safely or obtaining transportation?  No.    Are there any barriers preventing you from using a telephone or calling for help?  No    Are there any barriers preventing you from shopping?  No.    Are there any barriers preventing you from taking care of your own finances?  No    Are there any barriers preventing you from managing your medications?  No    Are there any barriers preventing you from showering, bathing or dressing yourself? No    Are there any barriers preventing you from doing housework or laundry? No  Are there any barriers preventing you from using the toilet?No  Are you currently engaging in any exercise or physical activity?  Yes. Walking daily     Self-Assessment of Health  What is your perception of your health? Fair  Do you sleep more than six hours a night? Yes  In the past 7 days, how much did pain keep you from doing your normal work? None  Do you spend quality time with family or friends (virtually or in person)? Yes  Do you usually eat a heart healthy diet that constists of a variety of fruits, vegetables, whole grains and fiber? Yes  Do you eat foods high in fat and/or Fast Food more than three times per week? No    Advance Care Planning  Do you have an Advance Directive, Living  Will, Durable Power of , or POLST? No               Health Maintenance Summary            Ordered - Hepatitis C Screening (Once) Ordered on 9/29/2023      No completion history exists for this topic.              Overdue - COVID-19 Vaccine (6 - Pfizer series) Overdue since 2/20/2023      10/20/2022  Imm Admin: PFIZER BIVALENT SARS-COV-2 VACCINE (12+)    04/08/2022  Imm Admin: PFIZER PURPLE CAP SARS-COV-2 VACCINATION (12+)    09/30/2021  Imm Admin: PFIZER PURPLE CAP SARS-COV-2 VACCINATION (12+)    02/18/2021  Imm Admin: PFIZER PURPLE CAP SARS-COV-2 VACCINATION (12+)    01/26/2021  Imm Admin: PFIZER PURPLE CAP SARS-COV-2 VACCINATION (12+)              Ordered - Influenza Vaccine (1) Ordered on 9/29/2023      10/07/2022  Imm Admin: Influenza Vaccine Adult HD    09/21/2021  Imm Admin: Influenza Vaccine Adult HD    09/14/2020  Imm Admin: Influenza Vaccine Adult HD    09/26/2019  Imm Admin: Influenza Vaccine Adult HD    10/13/2018  Imm Admin: Influenza Vaccine Adult HD    Only the first 5 history entries have been loaded, but more history exists.              Fasting Lipid Profile (Yearly) Tentatively due on 2/14/2024 02/14/2023  Lipid Profile    01/07/2022  Lipid Profile    10/21/2020  Outside Procedure: LIPID PROFILE              Diabetes: Urine Protein Screening (Yearly) Next due on 2/14/2024 02/14/2023  MICROALBUMIN CREAT RATIO URINE    03/10/2022  MICROALBUMIN CREAT RATIO URINE    02/04/2021  MICROALBUMIN CREAT RATIO URINE              Diabetes: Retinopathy Screening (Yearly) Next due on 3/6/2024      03/06/2023  AMB EXTERNAL RETINAL SCREENING RESULTS    03/08/2022  REFERRAL FOR RETINAL SCREENING EXAM    03/04/2021  REFERRAL FOR RETINAL SCREENING EXAM    01/09/2018 09/11/2015      Only the first 5 history entries have been loaded, but more history exists.              A1c Screening (Every 6 Months) Tentatively due on 3/29/2024      09/29/2023  POCT A1C    01/06/2023  HEMOGLOBIN A1C     06/24/2022  HEMOGLOBIN A1C    03/10/2022  HEMOGLOBIN A1C    08/18/2021  HEMOGLOBIN A1C    Only the first 5 history entries have been loaded, but more history exists.              SERUM CREATININE (Yearly) Next due on 9/25/2024 09/25/2023  Basic Metabolic Panel    07/24/2023  Basic Metabolic Panel    12/13/2022  Basic Metabolic Panel    06/24/2022  Comp Metabolic Panel    05/24/2021  Basic Metabolic Panel    Only the first 5 history entries have been loaded, but more history exists.              Annual Wellness Visit (Every 366 Days) Next due on 9/29/2024 09/29/2023  Visit Dx: Encounter for Medicare annual wellness exam    09/29/2023  Level of Service: ANNUAL WELLNESS VISIT-INCLUDES PPPS SUBSEQUE*    03/17/2021  Visit Dx: Encounter for Medicare annual wellness exam              Diabetes: Monofilament / LE Exam (Yearly) Next due on 9/29/2024 09/29/2023  Done    03/25/2022  Diabetic Monofilament Lower Extremity Exam    03/17/2021  Diabetic Monofilament Lower Extremity Exam              Bone Density Scan (Every 5 Years) Tentatively due on 2/24/2026 02/24/2021  DS-BONE DENSITY STUDY (DEXA)    09/17/2011  DS-BONE DENSITY STUDY (DEXA)    09/12/2011  DS-BONE DENSITY STUDY (DEXA)    11/17/2006  DS-BONE DENSITY STUDY (DEXA)              IMM DTaP/Tdap/Td Vaccine (3 - Td or Tdap) Next due on 10/13/2028      10/13/2018  Imm Admin: Tdap Vaccine    08/13/2008  Imm Admin: Tdap Vaccine              Pneumococcal Vaccine: 65+ Years (Series Information) Completed      08/15/2016  Imm Admin: Pneumococcal polysaccharide vaccine (PPSV-23)    08/25/2015  Imm Admin: Pneumococcal Conjugate Vaccine (Prevnar/PCV-13)    08/13/2008  Imm Admin: Pneumococcal polysaccharide vaccine (PPSV-23)              Hepatitis A Vaccine (Hep A) (Series Information) Aged Out      02/27/2019  Imm Admin: Hepatitis A Vaccine, Adult    09/22/2009  Imm Admin: Hepatitis A Vaccine, Adult              Hepatitis B Vaccine (Hep B) (Series Information)  Completed      08/28/2019  Imm Admin: Hepatitis B Vaccine (Adol/Adult)    03/28/2019  Imm Admin: Hepatitis B Vaccine (Adol/Adult)    02/27/2019  Imm Admin: Hepatitis B Vaccine (Adol/Adult)    10/20/2009  Imm Admin: Hepatitis B Vaccine (Adol/Adult)    09/22/2009  Imm Admin: Hepatitis B Vaccine (Adol/Adult)              Zoster (Shingles) Vaccines (Series Information) Completed      01/12/2022  Imm Admin: Zoster Vaccine Recombinant (RZV) (SHINGRIX)    10/14/2021  Imm Admin: Zoster Vaccine Recombinant (RZV) (SHINGRIX)    10/20/2009  Imm Admin: Zoster Vaccine Live (ZVL) (Zostavax) - HISTORICAL DATA              HPV Vaccines (Series Information) Aged Out      No completion history exists for this topic.              Polio Vaccine (Inactivated Polio) (Series Information) Aged Out      No completion history exists for this topic.              Meningococcal Immunization (Series Information) Aged Out      No completion history exists for this topic.              Discontinued - Mammogram  Discontinued        Frequency changed to Never automatically (Topic No Longer Applies)    02/16/2023  MA-SCREENING MAMMO BILAT W/TOMOSYNTHESIS W/CAD    02/10/2022  MA-SCREENING MAMMO BILAT W/TOMOSYNTHESIS W/CAD    01/19/2021  MA-SCREENING MAMMO BILAT W/TOMOSYNTHESIS W/CAD    07/11/2017  [id number]    Only the first 5 history entries have been loaded, but more history exists.              Discontinued - Colorectal Cancer Screening  Discontinued        Frequency changed to Never automatically (Topic No Longer Applies)    01/21/2020  REFERRAL TO GI FOR COLONOSCOPY    01/21/2020  REFERRAL TO GI FOR COLONOSCOPY                    Patient Care Team:  Hamida Hooper M.D. as PCP - General (Family Medicine)  Gilda Bah M.D. as Consulting Physician (Dermatology)      Social History     Tobacco Use    Smoking status: Never    Smokeless tobacco: Never   Vaping Use    Vaping Use: Never used   Substance Use Topics    Alcohol  "use: Not Currently     Comment: every couple of years 1 drink     Drug use: Not Currently     Family History   Problem Relation Age of Onset    Cancer Father         Cancer    Cancer Sister         renal    Cancer Brother         renal    Cancer Sister         Renal Carcinoma    Diabetes Neg Hx     Ovarian Cancer Neg Hx     Tubal Cancer Neg Hx     Peritoneal Cancer Neg Hx     Colorectal Cancer Neg Hx     Breast Cancer Neg Hx      She  has a past medical history of Allergy, Ankle fracture (2012), Blood clotting disorder (HCC), Cancer (HCC), Cataract, Colitis (2020), Dental disorder, Diabetes (HCC), Disorder of thyroid, GERD (gastroesophageal reflux disease), Hemorrhagic disorder (HCC), Hiatus hernia syndrome, High cholesterol, and Hypertension.   Past Surgical History:   Procedure Laterality Date    TURBT (TRANSURETHRAL RESECTION OF BLADDER TUMOR)  5/28/2021    Procedure: TURBT (TRANSURETHRAL RESECTION OF BLADDER TUMOR) - BIPOLAR.;  Surgeon: Dave Arteaga M.D.;  Location: SURGERY [facility];  Service: Urology    ABDOMINAL HYSTERECTOMY TOTAL  1970?    CHOLECYSTECTOMY  1995?    LUMPECTOMY Right     benign    OTHER      colonoscopy X2       Exam:   /74 (BP Location: Right arm, Patient Position: Sitting, BP Cuff Size: Adult)   Pulse 84   Temp 36.2 °C (97.1 °F) (Temporal)   Ht 1.626 m (5' 4\")   Wt 75.4 kg (166 lb 3.2 oz)   SpO2 96%  Body mass index is 28.53 kg/m².    Hearing good.    Dentition good  Alert, oriented in no acute distress.  Eye contact is good, speech goal directed, affect calm  Diabetic foot exam: No lesions or calluses noted. 2+ pedal pulses. Sensation intact with 10 out of 10 on monofilament test.      Assessment and Plan. The following treatment and monitoring plan is recommended:      1. Encounter for Medicare annual wellness exam  2. Need for hepatitis C screening test  3. Need for vaccination  Rebecca is a pleasant 77-year-old woman here today for her Medicare wellness visit.  Flu " vaccine provided in the office today.  I recommend she get the COVID-19 booster and the RSV vaccine as well.  She does have a few acute concerns which are discussed below.  - HCV Scrn ( [id number] 1xLife); Future  - INFLUENZA VACCINE, HIGH DOSE (65+ ONLY)    4. Atherosclerosis of aorta (HCC)  5. Mixed hyperlipidemia  Chronic, controlled. She is on a statin for primary prevention and tolerating it well. The  last cholesterol panel in  was within normal limits except triglycerides and HDL, so we will continue the current dosing.  - Atorvastatin 40 mg daily    6. Type 2 diabetes mellitus with microalbuminuria, without long-term current use of insulin (HCC)  Chronic, controlled.  Hemoglobin A1c is under 7.0%.  She is up-to-date with all diabetic screenings, on an ACE inhibitor for renal protection, and is on a statin for cardiovascular protection.  We will continue the current regimen.  - Metformin ER 1000 mg every morning and 500 mg every evening  - POCT A1C    7. CKD stage G2/A2, GFR 60-89 and albumin creatinine ratio  mg/g  Chronic, stable.  Present for at least a year.  We will monitor labs yearly.    8. Essential hypertension  Chronic, controlled.  Blood pressure is at goal under 140/90 in the office today.  We will continue the current regimen.  - Carvedilol 12.5 mg twice daily  - Telmisartan 80 mg nightly  - Prazosin 1 mg 3 times daily as needed blood pressures above 160/100    9. Gastroesophageal reflux disease without esophagitis  10. Hiatal hernia  Chronic, controlled.  She reports that her daily PPI use continues to control her symptoms well.  She will continue this regimen.  - Omeprazole 20 mg daily    11. Hypothyroidism due to acquired atrophy of thyroid  Chronic, controlled. She is on levothyroxine and tolerating it well. Last TSH in  was within normal limits, so we will continue the current dosing.  - Levothyroxine 75 mcg daily    12. Overactive bladder  Chronic,  stable.  She has failed multiple medication options.  She tried PTNS with urology but her symptoms are still not completely managed so in July, 2023 she had an SNS stage I device implanted.  She will follow-up with urology as directed.    13. Obstructive sleep apnea syndrome  Chronic, status unknown.  She is not aware that she was given a sleep apnea diagnosis and is not treating it currently.  Taking through care everywhere it appears she had a sleep study in 2008 and in 2012 she saw an ENT doctor who did not think surgery would be an option for her and recommended CPAP.  I am not certain she is ever had the sleep apnea treated.  Stop bang score today is 2 indicating a low risk of sleep apnea and notably she is 20 pounds lighter than when she was seeing the outside facility in discussing her sleep apnea.  At this time we will hold off on repeating a sleep study but may want to consider in the future.    Services suggested: No services needed at this time  Health Care Screening: Age-appropriate preventive services recommended by USPTF and ACIP covered by Medicare were discussed today. Services ordered if indicated and agreed upon by the patient.  Referrals offered: Community-based lifestyle interventions to reduce health risks and promote self-management and wellness, fall prevention, nutrition, physical activity, tobacco-use cessation, weight loss, and mental health services as per orders if indicated.    Discussion today about general wellness and lifestyle habits:    Prevent falls and reduce trip hazards; Cautioned about securing or removing rugs.  Have a working fire alarm and carbon monoxide detector;   Engage in regular physical activity and social activities     ACUTE VISIT    14. Change in bowel habits  Chronic, uncontrolled.  For the last 2 years she has been having significant difficulty with diarrhea.  When we last followed up in January, 2023 her diarrhea had improved but now it is gotten worse again.   In December, 2022 I did place a referral for gastroenterology but she but she never followed up on it.  I will place a new referral today and she knows to monitor Manuela for the referral information and to make the phone call herself to schedule an appointment.  - Referral to Gastroenterology    15. Anemia, unspecified type  Chronic, stable.  She has been noted to be mildly anemic since February, 2023.  Her urology office told her that she was iron deficient but her MCV was normal and there is no actual iron panel to back that up.  Additionally, she tells me she has been taking an iron supplement for the last 2 years so she is iron deficient and that was the reason for her anemia, I would expect that she would not be anemic on her labs.  We are going to repeat labs and check an iron panel.  If the iron panel is high normal or elevated I would recommend she stop her iron supplement.  If the anemia persists we will do a work-up.  - CBC WITHOUT DIFFERENTIAL; Future  - IRON/TOTAL IRON BIND; Future  - FERRITIN; Future    16. Recurrent UTI  Chronic, acutely exacerbated.  She has a history of recurrent UTI and she think she is currently having another 1.  For the last 3 days has been noticing increased urinary frequency.  She does not have any dysuria and she states that the urgency is difficult to determine based on her overactive bladder symptoms. UA in the office is normal, so we will wait for the culture results before providing antibiotics.  - POCT Urinalysis  - URINE CULTURE(NEW); Future    17. Bilateral hearing loss, unspecified hearing loss type  Chronic, stable.  She reports that she been getting bilateral hearing loss.  She is interested in having her hearing evaluated soon audiology referral has been placed today.  - Referral to Audiology    **Patient was informed the annual wellness visit does not include a discussion of acute/new concerns. She was informed that this portion of the visit will be coded  separately and She will receive a separate bill later. Patient expressed understanding.      Follow-up: Return for carpal tunnel.

## 2023-10-02 LAB
BACTERIA UR CULT: NORMAL
SIGNIFICANT IND 70042: NORMAL
SITE SITE: NORMAL
SOURCE SOURCE: NORMAL

## 2023-11-08 ENCOUNTER — OFFICE VISIT (OUTPATIENT)
Dept: MEDICAL GROUP | Facility: PHYSICIAN GROUP | Age: 78
End: 2023-11-08
Payer: MEDICARE

## 2023-11-08 ENCOUNTER — HOSPITAL ENCOUNTER (OUTPATIENT)
Facility: MEDICAL CENTER | Age: 78
End: 2023-11-08
Attending: FAMILY MEDICINE
Payer: MEDICARE

## 2023-11-08 VITALS
SYSTOLIC BLOOD PRESSURE: 152 MMHG | HEIGHT: 64 IN | TEMPERATURE: 97.1 F | BODY MASS INDEX: 29.47 KG/M2 | WEIGHT: 172.6 LBS | DIASTOLIC BLOOD PRESSURE: 86 MMHG | OXYGEN SATURATION: 97 % | HEART RATE: 85 BPM

## 2023-11-08 DIAGNOSIS — N39.0 RECURRENT UTI: ICD-10-CM

## 2023-11-08 DIAGNOSIS — G56.03 BILATERAL CARPAL TUNNEL SYNDROME: Primary | ICD-10-CM

## 2023-11-08 PROBLEM — G56.00 CARPAL TUNNEL SYNDROME: Status: ACTIVE | Noted: 2023-11-08

## 2023-11-08 PROCEDURE — 99213 OFFICE O/P EST LOW 20 MIN: CPT | Performed by: FAMILY MEDICINE

## 2023-11-08 PROCEDURE — 3077F SYST BP >= 140 MM HG: CPT | Performed by: FAMILY MEDICINE

## 2023-11-08 PROCEDURE — 3079F DIAST BP 80-89 MM HG: CPT | Performed by: FAMILY MEDICINE

## 2023-11-08 PROCEDURE — 87086 URINE CULTURE/COLONY COUNT: CPT

## 2023-11-08 ASSESSMENT — ENCOUNTER SYMPTOMS
SHORTNESS OF BREATH: 0
FLANK PAIN: 0
FEVER: 0
NAUSEA: 0
VOMITING: 0
CHILLS: 1

## 2023-11-08 ASSESSMENT — FIBROSIS 4 INDEX: FIB4 SCORE: 2.87

## 2023-11-08 NOTE — PROGRESS NOTES
"Subjective:     CC: carpal tunnel, UTI    HPI:   Kristin presents today with    Problem   Bilateral Carpal Tunnel Syndrome    Years, exacerbated since 2/2023  Both hands, R>L  Triggered by knitting - stopping will resolve symptoms  Hold hand will \"go to sleep\" - tingling  No pain  Unsure if she has lost strength in the hand  She does wear a wrist brace almost 24/7       Recurrent Uti    Chronic. Reports recurrent UTIs since the 1960s. Seen at [facility] [place] 1.5 weeks ago. She was put on cefdinir - this did not work so they put her on levofloxacin. They did not get a urine culture.    She thinks she still has symptoms - urinary frequency, dysuria, urinary urgency. No hematuria.          Health Maintenance: Completed    ROS:  Review of Systems   Constitutional:  Positive for chills. Negative for fever.   Respiratory:  Negative for shortness of breath.    Cardiovascular:  Negative for chest pain.   Gastrointestinal:  Negative for nausea and vomiting.   Genitourinary:  Negative for flank pain.       Objective:     Exam:  BP (!) 152/86 (BP Location: Left arm, Patient Position: Sitting, BP Cuff Size: Adult)   Pulse 85   Temp 36.2 °C (97.1 °F) (Temporal)   Ht 1.626 m (5' 4\")   Wt 78.3 kg (172 lb 9.6 oz)   SpO2 97%   BMI 29.63 kg/m²  Body mass index is 29.63 kg/m².    Physical Exam  Constitutional:       Appearance: Normal appearance.   Cardiovascular:      Rate and Rhythm: Normal rate and regular rhythm.      Heart sounds: Normal heart sounds.   Pulmonary:      Effort: Pulmonary effort is normal.      Breath sounds: Normal breath sounds.   Musculoskeletal:      Cervical back: Normal range of motion and neck supple.      Comments: Wrist: tinel's negative bilaterally, phalen's positive bilaterally   Neurological:      Mental Status: She is alert.           Assessment & Plan:     78 y.o. female with the following -     1. Bilateral carpal tunnel syndrome  Chronic, uncontrolled.  For years she has had bilateral carpal tunnel " that would especially be triggered by knitting.  If she stop knitting for several days the symptoms would resolve.  However, after shoveling snow February, 2023 she has had consistent carpal tunnel symptoms in her right hand.  She wears a wrist brace almost 24/7 and is still not controlling the symptoms.  We will send her to orthopedics to discuss injection versus surgical procedures.  - Referral to Orthopedics    2. Recurrent UTI  Chronic, acutely exacerbated.  She reports since the 1960s she has had recurrent UTIs.  She was seen at urgent care 1.5 weeks ago and given 2 different antibiotics.  However, she reports that she still having symptoms-dysuria, urgency, and frequency.  Since she just had 2 antibiotics, we will get a urine culture to be sure there truly is an infection and to be sure I give her the correct antibiotic.  If there is no bacterial growth on the culture, I will send her to urology to address her symptoms.  - URINE CULTURE(NEW); Future    Return in about 2 months (around 1/8/2024) for f/u DM, get A1c.    Please note that this dictation was created using voice recognition software. I have made every reasonable attempt to correct obvious errors, but I expect that there are errors of grammar and possibly content that I did not discover before finalizing the note.

## 2023-11-11 LAB
BACTERIA UR CULT: NORMAL
SIGNIFICANT IND 70042: NORMAL
SITE SITE: NORMAL
SOURCE SOURCE: NORMAL

## 2023-11-14 DIAGNOSIS — I10 ESSENTIAL HYPERTENSION: ICD-10-CM

## 2023-11-15 RX ORDER — TELMISARTAN 80 MG/1
80 TABLET ORAL
Qty: 90 TABLET | Refills: 0 | Status: SHIPPED | OUTPATIENT
Start: 2023-11-15 | End: 2024-01-17

## 2023-11-15 RX ORDER — CARVEDILOL 12.5 MG/1
12.5 TABLET ORAL 2 TIMES DAILY WITH MEALS
Qty: 180 TABLET | Refills: 0 | Status: SHIPPED | OUTPATIENT
Start: 2023-11-15 | End: 2024-01-17

## 2023-11-29 ENCOUNTER — PATIENT MESSAGE (OUTPATIENT)
Dept: HEALTH INFORMATION MANAGEMENT | Facility: OTHER | Age: 78
End: 2023-11-29

## 2024-01-11 ENCOUNTER — OFFICE VISIT (OUTPATIENT)
Dept: MEDICAL GROUP | Facility: PHYSICIAN GROUP | Age: 79
End: 2024-01-11
Payer: MEDICARE

## 2024-01-11 VITALS
BODY MASS INDEX: 29.37 KG/M2 | WEIGHT: 172 LBS | HEIGHT: 64 IN | HEART RATE: 86 BPM | TEMPERATURE: 97.7 F | OXYGEN SATURATION: 97 % | DIASTOLIC BLOOD PRESSURE: 84 MMHG | SYSTOLIC BLOOD PRESSURE: 144 MMHG

## 2024-01-11 DIAGNOSIS — N18.2 TYPE 2 DIABETES MELLITUS WITH STAGE 2 CHRONIC KIDNEY DISEASE, WITHOUT LONG-TERM CURRENT USE OF INSULIN (HCC): ICD-10-CM

## 2024-01-11 DIAGNOSIS — I70.0 ATHEROSCLEROSIS OF AORTA (HCC): ICD-10-CM

## 2024-01-11 DIAGNOSIS — L60.8 GREEN NAILS: ICD-10-CM

## 2024-01-11 DIAGNOSIS — E11.22 TYPE 2 DIABETES MELLITUS WITH STAGE 2 CHRONIC KIDNEY DISEASE, WITHOUT LONG-TERM CURRENT USE OF INSULIN (HCC): ICD-10-CM

## 2024-01-11 LAB
HBA1C MFR BLD: 6.5 % (ref ?–5.8)
POCT INT CON NEG: NEGATIVE
POCT INT CON POS: POSITIVE

## 2024-01-11 PROCEDURE — 83036 HEMOGLOBIN GLYCOSYLATED A1C: CPT | Performed by: FAMILY MEDICINE

## 2024-01-11 PROCEDURE — 3079F DIAST BP 80-89 MM HG: CPT | Performed by: FAMILY MEDICINE

## 2024-01-11 PROCEDURE — 3077F SYST BP >= 140 MM HG: CPT | Performed by: FAMILY MEDICINE

## 2024-01-11 PROCEDURE — 99214 OFFICE O/P EST MOD 30 MIN: CPT | Performed by: FAMILY MEDICINE

## 2024-01-11 RX ORDER — CEFDINIR 300 MG/1
CAPSULE ORAL
COMMUNITY
End: 2024-01-26

## 2024-01-11 RX ORDER — POLYETHYLENE GLYCOL-3350 AND ELECTROLYTES 236; 6.74; 5.86; 2.97; 22.74 G/274.31G; G/274.31G; G/274.31G; G/274.31G; G/274.31G
POWDER, FOR SOLUTION ORAL
COMMUNITY
End: 2024-01-26

## 2024-01-11 RX ORDER — CIPROFLOXACIN HYDROCHLORIDE 3.5 MG/ML
SOLUTION/ DROPS TOPICAL
Qty: 5 ML | Refills: 0 | Status: SHIPPED | OUTPATIENT
Start: 2024-01-11

## 2024-01-11 RX ORDER — LEVOFLOXACIN 750 MG/1
1 TABLET, FILM COATED ORAL
COMMUNITY
End: 2024-01-26

## 2024-01-11 ASSESSMENT — ENCOUNTER SYMPTOMS
SHORTNESS OF BREATH: 0
CHILLS: 0
FEVER: 0

## 2024-01-11 ASSESSMENT — FIBROSIS 4 INDEX: FIB4 SCORE: 2.87

## 2024-01-11 ASSESSMENT — PATIENT HEALTH QUESTIONNAIRE - PHQ9: CLINICAL INTERPRETATION OF PHQ2 SCORE: 0

## 2024-01-11 NOTE — PROGRESS NOTES
"Subjective:     CC: DM, toenail abnormality, toe concern    HPI:   Kristin presents today with    Problem   Type 2 Diabetes Mellitus (Hcc)    This is a chronic condition. Exercising/walking more. Changed her diet.  Current medications:  Insulin: -  Biguanide: metformin ER 1000 mg qAM + 500 qPM  GLP1-RA: -  SGLT-2i: -  DPP4-I: -  TZD: -  Shravan: -  Sulfonyluria: -    Last A1c: 6.5%(1/2024); 6.3% (9/2023); 7.0% (1/2023); 6.6% (6/2022); 7.6% (3/2022); 6.9% (8/2021)  Last Microalb/Cr ratio: <30 (2/2023)  Fasting sugars: n/a  Last diabetic foot exam: 9/2023  Last retinal eye exam: 3/2023 - no retinopathy  ACEi/ARB: losartan 100 mg  Statin: atorvastatin 40 mg  Aspirin: n/a  Concomitant HTN: yes - at goal  Nightly foot checks: no         Toe nail abnormality  4-5 days  R big toe  The toe nail is green & yellow  No recalled trauma  Some pain, depends on type of shoe  Also noticing some symptom between toes bilaterally, mostly at night for a couple of months - unsure if numb, tingling, or something else    Health Maintenance: Completed    ROS:  Review of Systems   Constitutional:  Negative for chills and fever.   Respiratory:  Negative for shortness of breath.    Cardiovascular:  Negative for chest pain.       Objective:     Exam:  BP (!) 144/84 (BP Location: Left arm, Patient Position: Sitting, BP Cuff Size: Adult)   Pulse 86   Temp 36.5 °C (97.7 °F) (Temporal)   Ht 1.626 m (5' 4\")   Wt 78 kg (172 lb)   SpO2 97%   BMI 29.52 kg/m²  Body mass index is 29.52 kg/m².    Physical Exam  Constitutional:       Appearance: Normal appearance.   Cardiovascular:      Rate and Rhythm: Normal rate and regular rhythm.      Heart sounds: Normal heart sounds.   Pulmonary:      Effort: Pulmonary effort is normal.      Breath sounds: Normal breath sounds.   Musculoskeletal:      Cervical back: Normal range of motion and neck supple.   Feet:      Comments: Sensation intact to monofilament testing between toes bilterally  Greenish discoloration " of right big toe nail  Neurological:      Mental Status: She is alert.             Assessment & Plan:     78 y.o. female with the following -     1. Type 2 diabetes mellitus with stage 2 chronic kidney disease, without long-term current use of insulin (HCC)  Chronic, controlled.  Hemoglobin A1c is under 7.0%.  She is up-to-date with all diabetic screenings, on an ACE inhibitor for renal protection, and is on a statin for cardiovascular protection.  We will adjust her the current regimen.  - decrease metformin XR to 500 mg bid  - POCT Hemoglobin A1C    2. Green nails  Acute. For the last 4-5 days she has noticed blue-green discoloration of her right big toe nail. No other evidence of onychomycosis.  This may be green nail syndrome so we will treat for Pseudomonas infection.  - Ciprofloxacin 0.3% ophthalmic solution, 1 drop on toenail 3 times a day for 2 weeks  - 1:4 bleach:water solution soaks 2-3 times daily    HCC Gap Form    Diagnosis to address: I70.0 - Atherosclerosis of aorta (HCC)  Assessment and plan: Chronic, stable. Continue with current defined treatment plan: atorvastatin 40 mg daily. Follow-up at least annually.  Last edited 01/11/24 16:02 PST by Hamida Hooper M.D.         Return in about 3 months (around 4/11/2024) for f/u DM, get A1c.    Please note that this dictation was created using voice recognition software. I have made every reasonable attempt to correct obvious errors, but I expect that there are errors of grammar and possibly content that I did not discover before finalizing the note.

## 2024-01-11 NOTE — PATIENT INSTRUCTIONS
FOR THE TOE NAIL  - You will do bleach water soaks 2-3 times per day  - 1 part bleach to 4 parts water  - Soak for 5-10 minutes  - Then use the topical antibiotics as directed for 2 weeks

## 2024-01-16 DIAGNOSIS — I10 ESSENTIAL HYPERTENSION: ICD-10-CM

## 2024-01-17 RX ORDER — TELMISARTAN 80 MG/1
80 TABLET ORAL
Qty: 90 TABLET | Refills: 0 | Status: SHIPPED | OUTPATIENT
Start: 2024-01-17 | End: 2024-03-26

## 2024-01-17 RX ORDER — CARVEDILOL 12.5 MG/1
12.5 TABLET ORAL 2 TIMES DAILY WITH MEALS
Qty: 180 TABLET | Refills: 0 | Status: SHIPPED | OUTPATIENT
Start: 2024-01-17 | End: 2024-03-26

## 2024-01-18 ENCOUNTER — HOSPITAL ENCOUNTER (EMERGENCY)
Facility: MEDICAL CENTER | Age: 79
End: 2024-01-18
Attending: EMERGENCY MEDICINE
Payer: MEDICARE

## 2024-01-18 ENCOUNTER — APPOINTMENT (OUTPATIENT)
Dept: RADIOLOGY | Facility: MEDICAL CENTER | Age: 79
End: 2024-01-18
Attending: EMERGENCY MEDICINE
Payer: MEDICARE

## 2024-01-18 VITALS
RESPIRATION RATE: 20 BRPM | OXYGEN SATURATION: 93 % | SYSTOLIC BLOOD PRESSURE: 129 MMHG | TEMPERATURE: 97.2 F | WEIGHT: 163 LBS | HEIGHT: 64 IN | BODY MASS INDEX: 27.83 KG/M2 | DIASTOLIC BLOOD PRESSURE: 60 MMHG | HEART RATE: 94 BPM

## 2024-01-18 DIAGNOSIS — I16.0 HYPERTENSIVE URGENCY: ICD-10-CM

## 2024-01-18 DIAGNOSIS — T17.908A ASPIRATION INTO RESPIRATORY TRACT, INITIAL ENCOUNTER: ICD-10-CM

## 2024-01-18 LAB
ALBUMIN SERPL BCP-MCNC: 4.2 G/DL (ref 3.2–4.9)
ALBUMIN/GLOB SERPL: 1.6 G/DL
ALP SERPL-CCNC: 79 U/L (ref 30–99)
ALT SERPL-CCNC: 24 U/L (ref 2–50)
ANION GAP SERPL CALC-SCNC: 12 MMOL/L (ref 7–16)
ANISOCYTOSIS BLD QL SMEAR: ABNORMAL
AST SERPL-CCNC: 30 U/L (ref 12–45)
BASOPHILS # BLD AUTO: 0.9 % (ref 0–1.8)
BASOPHILS # BLD: 0.03 K/UL (ref 0–0.12)
BILIRUB SERPL-MCNC: 0.7 MG/DL (ref 0.1–1.5)
BUN SERPL-MCNC: 11 MG/DL (ref 8–22)
CALCIUM ALBUM COR SERPL-MCNC: 8.4 MG/DL (ref 8.5–10.5)
CALCIUM SERPL-MCNC: 8.6 MG/DL (ref 8.5–10.5)
CHLORIDE SERPL-SCNC: 103 MMOL/L (ref 96–112)
CO2 SERPL-SCNC: 22 MMOL/L (ref 20–33)
CREAT SERPL-MCNC: 0.72 MG/DL (ref 0.5–1.4)
EKG IMPRESSION: NORMAL
EOSINOPHIL # BLD AUTO: 0.1 K/UL (ref 0–0.51)
EOSINOPHIL NFR BLD: 3.5 % (ref 0–6.9)
ERYTHROCYTE [DISTWIDTH] IN BLOOD BY AUTOMATED COUNT: 44.4 FL (ref 35.9–50)
GFR SERPLBLD CREATININE-BSD FMLA CKD-EPI: 84 ML/MIN/1.73 M 2
GLOBULIN SER CALC-MCNC: 2.6 G/DL (ref 1.9–3.5)
GLUCOSE SERPL-MCNC: 122 MG/DL (ref 65–99)
HCT VFR BLD AUTO: 33.7 % (ref 37–47)
HGB BLD-MCNC: 11.5 G/DL (ref 12–16)
LYMPHOCYTES # BLD AUTO: 1.11 K/UL (ref 1–4.8)
LYMPHOCYTES NFR BLD: 39.5 % (ref 22–41)
MANUAL DIFF BLD: NORMAL
MCH RBC QN AUTO: 29.6 PG (ref 27–33)
MCHC RBC AUTO-ENTMCNC: 34.1 G/DL (ref 32.2–35.5)
MCV RBC AUTO: 86.9 FL (ref 81.4–97.8)
MICROCYTES BLD QL SMEAR: ABNORMAL
MONOCYTES # BLD AUTO: 0.1 K/UL (ref 0–0.85)
MONOCYTES NFR BLD AUTO: 3.5 % (ref 0–13.4)
MORPHOLOGY BLD-IMP: NORMAL
NEUTROPHILS # BLD AUTO: 1.47 K/UL (ref 1.82–7.42)
NEUTROPHILS NFR BLD: 52.6 % (ref 44–72)
NRBC # BLD AUTO: 0 K/UL
NRBC BLD-RTO: 0 /100 WBC (ref 0–0.2)
PLATELET # BLD AUTO: 187 K/UL (ref 164–446)
PLATELET BLD QL SMEAR: NORMAL
PMV BLD AUTO: 9.1 FL (ref 9–12.9)
POTASSIUM SERPL-SCNC: 4 MMOL/L (ref 3.6–5.5)
PROT SERPL-MCNC: 6.8 G/DL (ref 6–8.2)
RBC # BLD AUTO: 3.88 M/UL (ref 4.2–5.4)
RBC BLD AUTO: PRESENT
SODIUM SERPL-SCNC: 137 MMOL/L (ref 135–145)
TROPONIN T SERPL-MCNC: 11 NG/L (ref 6–19)
WBC # BLD AUTO: 2.8 K/UL (ref 4.8–10.8)

## 2024-01-18 PROCEDURE — 700102 HCHG RX REV CODE 250 W/ 637 OVERRIDE(OP): Performed by: EMERGENCY MEDICINE

## 2024-01-18 PROCEDURE — 93005 ELECTROCARDIOGRAM TRACING: CPT | Performed by: EMERGENCY MEDICINE

## 2024-01-18 PROCEDURE — 36415 COLL VENOUS BLD VENIPUNCTURE: CPT

## 2024-01-18 PROCEDURE — 99284 EMERGENCY DEPT VISIT MOD MDM: CPT

## 2024-01-18 PROCEDURE — 80053 COMPREHEN METABOLIC PANEL: CPT

## 2024-01-18 PROCEDURE — A9270 NON-COVERED ITEM OR SERVICE: HCPCS | Performed by: EMERGENCY MEDICINE

## 2024-01-18 PROCEDURE — 85007 BL SMEAR W/DIFF WBC COUNT: CPT

## 2024-01-18 PROCEDURE — 85027 COMPLETE CBC AUTOMATED: CPT

## 2024-01-18 PROCEDURE — 70450 CT HEAD/BRAIN W/O DYE: CPT

## 2024-01-18 PROCEDURE — 71045 X-RAY EXAM CHEST 1 VIEW: CPT

## 2024-01-18 PROCEDURE — 84484 ASSAY OF TROPONIN QUANT: CPT

## 2024-01-18 RX ORDER — CARVEDILOL 12.5 MG/1
12.5 TABLET ORAL ONCE
Status: COMPLETED | OUTPATIENT
Start: 2024-01-18 | End: 2024-01-18

## 2024-01-18 RX ORDER — CARVEDILOL 12.5 MG/1
12.5 TABLET ORAL 2 TIMES DAILY WITH MEALS
Status: DISCONTINUED | OUTPATIENT
Start: 2024-01-18 | End: 2024-01-18

## 2024-01-18 RX ADMIN — CARVEDILOL 12.5 MG: 12.5 TABLET, FILM COATED ORAL at 13:45

## 2024-01-18 ASSESSMENT — FIBROSIS 4 INDEX: FIB4 SCORE: 2.87

## 2024-01-18 NOTE — ED PROVIDER NOTES
ED Provider Note    CHIEF COMPLAINT  Chief Complaint   Patient presents with    Other     Pt aspirated during procedure PTA    Hypertension       EXTERNAL RECORDS REVIEWED  Reviewed baseline labs for comparison.    HPI/ROS  LIMITATION TO HISTORY   Patient was sedated on this episode so she is unable to provide any history.  OUTSIDE HISTORIAN(S):  History is obtained by nursing report from EMS.    Kristin Barrios is a 78 y.o. female who presents to the emergency department for complications that arose during a colonoscopy.  The patient was having an elective outpatient colonoscopy and during the procedure she had an episode or episodes of aspiration.  Apparently they suctioned her airway, but no PA in place.  She is also noted to have profound retention of 244/105.  The patient is amnestic to this because she was sedated.  She denies any cough or shortness of breath or chest pain.  She does have a headache.  She reports a history of chronic hypertension and takes telmisartan and carvedilol.  Typically takes carvedilol in the morning which she has not yet today but she has taken the telmisartan and carvedilol last night.  She has been n.p.o. and she reports being hungry.  She denies any other acute concerns or complaints.  Per EMS reports she did receive labetalol and esmolol there.  Currently mildly hypertensive.    PAST MEDICAL HISTORY   has a past medical history of Allergy, Ankle fracture (2012), Blood clotting disorder (HCC), Cancer (HCC), Cataract, Colitis (2020), Dental disorder, Diabetes (HCC), Disorder of thyroid, GERD (gastroesophageal reflux disease), Hemorrhagic disorder (HCC), Hiatus hernia syndrome, High cholesterol, and Hypertension.    SURGICAL HISTORY   has a past surgical history that includes lumpectomy (Right); cholecystectomy (1995?); abdominal hysterectomy total (1970?); other; turbt (transurethral resection of bladder tumor) (05/28/2021); and cataract extraction with iol (Bilateral,  "08/2023).    FAMILY HISTORY  Family History   Problem Relation Age of Onset    Cancer Father         Cancer    Cancer Sister         renal    Cancer Brother         renal    Cancer Sister         Renal Carcinoma    Diabetes Neg Hx     Ovarian Cancer Neg Hx     Tubal Cancer Neg Hx     Peritoneal Cancer Neg Hx     Colorectal Cancer Neg Hx     Breast Cancer Neg Hx        SOCIAL HISTORY  Social History     Tobacco Use    Smoking status: Never    Smokeless tobacco: Never   Vaping Use    Vaping Use: Never used   Substance and Sexual Activity    Alcohol use: Not Currently     Comment: every couple of years 1 drink     Drug use: Never    Sexual activity: Not Currently     Comment:  recently passed        CURRENT MEDICATIONS  Home Medications    **Home medications have not yet been reviewed for this encounter**         ALLERGIES  Allergies   Allergen Reactions    Lisinopril Cough     Cough 2/6/2008    Warfarin      1998-10-13;BLEEDING    Oxycodone Vomiting and Nausea       PHYSICAL EXAM  VITAL SIGNS: Ht 1.626 m (5' 4\")   Wt 73.9 kg (163 lb)   BMI 27.98 kg/m²    Constitutional: Well developed, Well nourished, No acute distress, Non-toxic appearance.   HENT: Normocephalic, Atraumatic, Bilateral external ears normal,  Eyes: PERRL, EOMI, Conjunctiva normal, No discharge.   Neck: Normal range of motion,  Cardiovascular: Normal heart rate, Normal rhythm, No murmurs, No rubs, No gallops.   Thorax & Lungs: Normal breath sounds, No respiratory distress, No wheezing,  Abdomen: Bowel sounds normal, Soft, No tenderness  Skin: Warm, Dry, No erythema, No rash.   Back: No tenderness, No CVA tenderness.     Musculoskeletal: Good range of motion in all major joints.   Neurologic: Alert, No focal deficits noted.   Psychiatric: Affect normal      DIAGNOSTIC STUDIES / PROCEDURES  Results for orders placed or performed during the hospital encounter of 01/18/24   CBC WITH DIFFERENTIAL   Result Value Ref Range    WBC 2.8 (L) 4.8 - 10.8 " K/uL    RBC 3.88 (L) 4.20 - 5.40 M/uL    Hemoglobin 11.5 (L) 12.0 - 16.0 g/dL    Hematocrit 33.7 (L) 37.0 - 47.0 %    MCV 86.9 81.4 - 97.8 fL    MCH 29.6 27.0 - 33.0 pg    MCHC 34.1 32.2 - 35.5 g/dL    RDW 44.4 35.9 - 50.0 fL    Platelet Count 187 164 - 446 K/uL    MPV 9.1 9.0 - 12.9 fL    Neutrophils-Polys 52.60 44.00 - 72.00 %    Lymphocytes 39.50 22.00 - 41.00 %    Monocytes 3.50 0.00 - 13.40 %    Eosinophils 3.50 0.00 - 6.90 %    Basophils 0.90 0.00 - 1.80 %    Nucleated RBC 0.00 0.00 - 0.20 /100 WBC    Neutrophils (Absolute) 1.47 (L) 1.82 - 7.42 K/uL    Lymphs (Absolute) 1.11 1.00 - 4.80 K/uL    Monos (Absolute) 0.10 0.00 - 0.85 K/uL    Eos (Absolute) 0.10 0.00 - 0.51 K/uL    Baso (Absolute) 0.03 0.00 - 0.12 K/uL    NRBC (Absolute) 0.00 K/uL    Anisocytosis 1+     Microcytosis 1+    COMP METABOLIC PANEL   Result Value Ref Range    Sodium 137 135 - 145 mmol/L    Potassium 4.0 3.6 - 5.5 mmol/L    Chloride 103 96 - 112 mmol/L    Co2 22 20 - 33 mmol/L    Anion Gap 12.0 7.0 - 16.0    Glucose 122 (H) 65 - 99 mg/dL    Bun 11 8 - 22 mg/dL    Creatinine 0.72 0.50 - 1.40 mg/dL    Calcium 8.6 8.5 - 10.5 mg/dL    Correct Calcium 8.4 (L) 8.5 - 10.5 mg/dL    AST(SGOT) 30 12 - 45 U/L    ALT(SGPT) 24 2 - 50 U/L    Alkaline Phosphatase 79 30 - 99 U/L    Total Bilirubin 0.7 0.1 - 1.5 mg/dL    Albumin 4.2 3.2 - 4.9 g/dL    Total Protein 6.8 6.0 - 8.2 g/dL    Globulin 2.6 1.9 - 3.5 g/dL    A-G Ratio 1.6 g/dL   TROPONIN   Result Value Ref Range    Troponin T 11 6 - 19 ng/L   ESTIMATED GFR   Result Value Ref Range    GFR (CKD-EPI) 84 >60 mL/min/1.73 m 2   DIFFERENTIAL MANUAL   Result Value Ref Range    Manual Diff Status PERFORMED    PERIPHERAL SMEAR REVIEW   Result Value Ref Range    Peripheral Smear Review see below    PLATELET ESTIMATE   Result Value Ref Range    Plt Estimation Normal    MORPHOLOGY   Result Value Ref Range    RBC Morphology Present    EKG (NOW)   Result Value Ref Range    Report       [facility]  [place] [facility].    Test Date:  2024  Pt Name:    YUMIKO FUENTES                Department: ER  MRN:        [id number]                      Room:       BL 13  Gender:     Female                       Technician: [id number]  :        [id number]                   Requested By:CLAUDE WILKERSON  Order #:    [id number]                    Reading MD: CLAUDE WILKERSON. [facility]    Measurements  Intervals                                Axis  Rate:       85                           P:          67  AR:         138                          QRS:        21  QRSD:       80                           T:          30  QT:         373  QTc:        444    Interpretive Statements  Sinus rhythm  Compared to ECG 2023 09:00:41  No significant changes  Electronically Signed On 2024 15:55:22 PST by CLAUDE WILKERSON. [facility]        RADIOLOGY  I have independently interpreted the diagnostic imaging associated with this visit and am waiting the final reading from the radiologist.     Radiologist interpretation:     CT-HEAD W/O   Final Result      1.  1.  There is no acute intracranial hemorrhage or infarct.   2.   3.  2.  White matter lucencies most consistent with small vessel ischemic change versus demyelination or gliosis.   4.   5.  3.  There is cerebral atrophy.         DX-CHEST-PORTABLE (1 VIEW)   Final Result      1.  Minimal LEFT lung base atelectasis.   2.  No lobar pneumonia or pneumothorax.            COURSE & MEDICAL DECISION MAKING    ED Observation Status?  Patient does not meet observation criteria    INITIAL ASSESSMENT, COURSE AND PLAN  Care Narrative:     This is an 78-year-old female who presents to the emergency department for evaluation of unusual episode complicating an outpatient colonoscopy procedure.  Apparently she was believed to have aspirated during her sedation and hypertension.  She is amnestic to the events we are presents with a headache and otherwise feeling okay.    Differential diagnosis includes  aspiration, hypoxia, hypertensive urgency emergency, perioperative MI or hemorrhage.    The patient's workup with labs and imaging for this.    Chest x-ray shows questionable atelectasis her lungs are clear she is not coughing or hypoxemic.  Certainly she could have aspirated do not see any evidence of at this time.  Will observe her make sure she does not develop symptoms.  \    Blood pressure was elevated during this episode still elevated here but she did receive labetalol.  Will give her home dose of carvedilol.      The patient expressed some concern about getting a head CT.  I felt the head CT was indicated because the patient had hypertension and a headache.  This is negative.    Ultimately patient is tolerating food and fluids.  She feels well.  She would like to go home.  At this point does not indication for prophylactic antibiotics or possible procedural aspiration.  She will go home and she will return if she develops cough, shortness of breath, chest pain, or other concerns otherwise she can see her doctor.  Patient understands her discharge instructions return precautions.  Questions were answered.  She is agreeable with the plan.      Patient had transient hypertension.  I suspect this is situational associated with her possible aspiration.  She looks well at this time and she is discharged in good condition.        HTN/IDDM FOLLOW UP:  The patient has known hypertension and is being followed by their primary care doctor      ADDITIONAL PROBLEM LIST  Hypertension  DISPOSITION AND DISCUSSIONS      Hamida Hooper M.D.  [address]  [place] [address]  [phone]              FINAL DIAGNOSIS  1. Hypertensive urgency    2. Aspiration into respiratory tract, initial encounter    3.  Possible aspiration.       Electronically signed by: Jaime Cano M.D., 1/18/2024 10:04 AM

## 2024-01-18 NOTE — ED NOTES
All lines and monitors disconnected.  Discharge instructions were reviewed, questions answered. Pt states all belongings in possession.  Pt ambulates with steady gait to the lobby, escorted by RN and family member.

## 2024-01-18 NOTE — ED TRIAGE NOTES
.  Chief Complaint   Patient presents with    Other     Pt aspirated during procedure PTA    Hypertension   Pt BIB EMS from [facility].  Per EMS Pt aspirated during colonoscopy procedure.  Pt suctioned immediately, OPA placed following aspiration.  N/V resolved.  No difficulty breathing or cough. Pt c/o minor headache at this time.  Pt has HX of HTN - last medications yesterday evening.  EMS reports BP of 244/105 UA.  Pt received Labetalol, Esmolol, Zofran at [facility].  Pt also received NS 900ml en route by EMS.

## 2024-01-18 NOTE — DISCHARGE INSTRUCTIONS
At this point results are reassuring.  Return if you develop cough, fever, shortness of breath or difficulty breathing or other concerns.  Otherwise continue home medicines and follow-up with your doctor.

## 2024-01-26 ENCOUNTER — OFFICE VISIT (OUTPATIENT)
Dept: MEDICAL GROUP | Facility: PHYSICIAN GROUP | Age: 79
End: 2024-01-26
Payer: MEDICARE

## 2024-01-26 VITALS
DIASTOLIC BLOOD PRESSURE: 70 MMHG | BODY MASS INDEX: 29.64 KG/M2 | HEIGHT: 64 IN | OXYGEN SATURATION: 98 % | SYSTOLIC BLOOD PRESSURE: 144 MMHG | WEIGHT: 173.6 LBS | HEART RATE: 84 BPM | TEMPERATURE: 97.6 F

## 2024-01-26 DIAGNOSIS — I10 ESSENTIAL HYPERTENSION: ICD-10-CM

## 2024-01-26 DIAGNOSIS — T17.908A ASPIRATION INTO AIRWAY, INITIAL ENCOUNTER: Primary | ICD-10-CM

## 2024-01-26 PROCEDURE — 99213 OFFICE O/P EST LOW 20 MIN: CPT | Performed by: FAMILY MEDICINE

## 2024-01-26 PROCEDURE — 3077F SYST BP >= 140 MM HG: CPT | Performed by: FAMILY MEDICINE

## 2024-01-26 PROCEDURE — 3078F DIAST BP <80 MM HG: CPT | Performed by: FAMILY MEDICINE

## 2024-01-26 ASSESSMENT — FIBROSIS 4 INDEX: FIB4 SCORE: 2.62

## 2024-01-26 NOTE — PROGRESS NOTES
"Subjective:     CC: ER follow up  1/18/2024    HPI:   Kristin presents today with ER follow up. Seen on 1/18/2023 with [facility] for colonoscopy, mildly elevated BP. They proceeded with the colonoscopy but then aspirated twice during the procedure. Then blood pressure shot up to over 200 systolic. Given labetalol & esmolol and [facility] was called.     In ER  Labs normal except for: low WBC, low RBC/Hb/Hc, low Ca, high glucose, GFR 84  EKG normal  CT head showed no acute findings  CXR showed no pneumonia    Since discharge  No cough, no fevers/chills, no SOB  She is noticing some fatigue    Health Maintenance: Completed    ROS:  See HPI    Objective:     Exam:  BP (!) 144/70 (BP Location: Left arm, Patient Position: Sitting, BP Cuff Size: Adult)   Pulse 84   Temp 36.4 °C (97.6 °F) (Temporal)   Ht 1.626 m (5' 4\")   Wt 78.7 kg (173 lb 9.6 oz)   SpO2 98%   BMI 29.80 kg/m²  Body mass index is 29.8 kg/m².    Physical Exam  Constitutional:       Appearance: Normal appearance.   Cardiovascular:      Rate and Rhythm: Normal rate and regular rhythm.      Heart sounds: Normal heart sounds.   Pulmonary:      Effort: Pulmonary effort is normal.      Breath sounds: Normal breath sounds.   Musculoskeletal:      Cervical back: Normal range of motion and neck supple.   Neurological:      Mental Status: She is alert.             Assessment & Plan:     78 y.o. female with the following -     1. Aspiration into airway, initial encounter  Acute.  On 1/18/2024 she was brought to the ER by [facility] as she aspirated twice during a colonoscopy.  Imaging did not show any inflammation or pneumonia of the lungs and she has not developed any symptoms so I suspect that while she did aspirate, because she was lying on her side for the colonoscopy it did not actually go down into the lung space.  I am uncertain as to why she aspirated and she has been told by orthopedics and GI that for any future procedure she needs to have it done at a hospital facility " because of this history of aspiration under anesthesia which I think is a reasonable precaution.    2. Essential hypertension  Chronic, stable.  Blood pressure is elevated in the office today with 2 readings.  She does have a history of whitecoat hypertension and just checking her blood pressure at home elevate her blood pressure so she does not monitor it at home.  We are going to continue her current regimen for now.  - Carvedilol 12.5 mg twice daily  - Telmisartan 80 mg nightly  - Prazosin 1 mg 3 times daily as needed blood pressure above 160/100    Return for pre-operative exam.    Please note that this dictation was created using voice recognition software. I have made every reasonable attempt to correct obvious errors, but I expect that there are errors of grammar and possibly content that I did not discover before finalizing the note.

## 2024-02-08 ENCOUNTER — TELEPHONE (OUTPATIENT)
Dept: MEDICAL GROUP | Facility: PHYSICIAN GROUP | Age: 79
End: 2024-02-08

## 2024-02-08 ENCOUNTER — TELEPHONE (OUTPATIENT)
Dept: SCHEDULING | Facility: IMAGING CENTER | Age: 79
End: 2024-02-08

## 2024-02-08 ENCOUNTER — TELEPHONE (OUTPATIENT)
Dept: HEALTH INFORMATION MANAGEMENT | Facility: OTHER | Age: 79
End: 2024-02-08

## 2024-02-08 ENCOUNTER — OFFICE VISIT (OUTPATIENT)
Dept: URGENT CARE | Facility: CLINIC | Age: 79
End: 2024-02-08
Payer: MEDICARE

## 2024-02-08 VITALS
DIASTOLIC BLOOD PRESSURE: 68 MMHG | WEIGHT: 169 LBS | HEART RATE: 100 BPM | OXYGEN SATURATION: 95 % | HEIGHT: 64 IN | BODY MASS INDEX: 28.85 KG/M2 | RESPIRATION RATE: 16 BRPM | TEMPERATURE: 97.3 F | SYSTOLIC BLOOD PRESSURE: 132 MMHG

## 2024-02-08 DIAGNOSIS — R25.1 SHAKINESS: ICD-10-CM

## 2024-02-08 LAB — GLUCOSE BLD-MCNC: 140 MG/DL (ref 65–99)

## 2024-02-08 PROCEDURE — 3075F SYST BP GE 130 - 139MM HG: CPT

## 2024-02-08 PROCEDURE — 82962 GLUCOSE BLOOD TEST: CPT

## 2024-02-08 PROCEDURE — 99213 OFFICE O/P EST LOW 20 MIN: CPT

## 2024-02-08 PROCEDURE — 3078F DIAST BP <80 MM HG: CPT

## 2024-02-08 ASSESSMENT — FIBROSIS 4 INDEX: FIB4 SCORE: 2.62

## 2024-02-08 NOTE — PROGRESS NOTES
Subjective:   Kristin Barrios is a 78 y.o. female who presents for Medication Reaction (Feeling shaky)      HPI: This is an 78-year-old female who presents today for episode of shakiness.  This is a new problem.  Patient reports episode of shakiness that developed this morning.  She reports symptoms were accompanied with some slight weakness.  She states that she immediately ate for apricots and symptoms gradually improved.  She does have underlying history of diabetes.  She reports taking her metformin and other routine home medications after the episode.  Patient reports that she does not check her blood sugars at home.  Over the course of the day, patient reports symptoms have improved.  She denies any chest pain, lightheadedness, dizziness, vision changes.  She does have an appointment with PCP scheduled for tomorrow.    Review of Systems   Eyes:  Negative for blurred vision, double vision and photophobia.   Cardiovascular:  Negative for chest pain and palpitations.   Neurological:  Negative for dizziness, tingling, tremors, sensory change, speech change, focal weakness, weakness and headaches.        Episode of shakiness has resolved   Endo/Heme/Allergies:  Positive for polydipsia.       Medications:    Current Outpatient Medications on File Prior to Visit   Medication Sig Dispense Refill    telmisartan (MICARDIS) 80 MG Tab TAKE 1 TABLET BY MOUTH AT  BEDTIME 90 Tablet 0    carvedilol (COREG) 12.5 MG Tab TAKE 1 TABLET BY MOUTH TWICE  DAILY WITH MEALS 180 Tablet 0    ciprofloxacin (CILOXIN) 0.3 % Solution 1 drop of solution on toe nail three times a day for 2 weeks. 5 mL 0    omeprazole (PRILOSEC) 20 MG delayed-release capsule TAKE 1 CAPSULE BY MOUTH  TWICE DAILY 180 Capsule 3    atorvastatin (LIPITOR) 40 MG Tab TAKE 1 TABLET BY MOUTH  DAILY 90 Tablet 3    metFORMIN ER (GLUCOPHAGE XR) 500 MG TABLET SR 24 HR TAKE 2 TABLETS BY MOUTH IN  THE MORNING AND 1 TABLET BY MOUTH IN THE EVENING (Patient taking differently:  Take 500 mg by mouth 2 times a day.) 270 Tablet 3    levothyroxine (SYNTHROID) 75 MCG Tab TAKE 1 TABLET BY MOUTH IN THE  MORNING ON AN EMPTY STOMACH 90 Tablet 3    prazosin (MINIPRESS) 1 MG Cap TAKE 1 CAPSULE BY MOUTH 3 TIMES  DAILY AS NEEDED FOR BLOOD  PRESSURE SPIKES, IF SBP&gt;160 OR  DBP &gt;100 FOR UP TO 90 DAYS 200 Capsule 0    Ferrous Sulfate (IRON PO) Take 1 tablet by mouth every day. OTC       No current facility-administered medications on file prior to visit.        Allergies:   Lisinopril, Warfarin, and Oxycodone    Problem List:   Patient Active Problem List   Diagnosis    Type 2 diabetes mellitus (HCC)    Essential hypertension    Mixed hyperlipidemia    Hypothyroidism due to acquired atrophy of thyroid    Gastroesophageal reflux disease without esophagitis    History of basal cell carcinoma (BCC)    Hiatal hernia    Recurrent UTI    Atherosclerosis of aorta (HCC)    Diverticulosis of colon    Sleep apnea    Tinnitus of both ears    Eczema    Overactive bladder    Seasonal allergies    Palpitations    Change in bowel habits    CKD stage G2/A2, 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    CHOLECYSTECTOMY  1995?    LUMPECTOMY Right     benign    OTHER      colonoscopy X2       Past Social Hx:   Social History     Tobacco Use    Smoking status: Never    Smokeless tobacco: Never   Vaping Use    Vaping Use: Never used   Substance Use Topics    Alcohol use: Not Currently     Comment: every couple of years 1 drink     Drug use: Never          Problem list, medications, and  "allergies reviewed by myself today in Epic.     Objective:     /68   Pulse 100   Temp 36.3 °C (97.3 °F)   Resp 16   Ht 1.626 m (5' 4\")   Wt 76.7 kg (169 lb)   SpO2 95%   BMI 29.01 kg/m²     Physical Exam  Vitals and nursing note reviewed.   Constitutional:       General: She is not in acute distress.     Appearance: Normal appearance. She is normal weight. She is not ill-appearing, toxic-appearing or diaphoretic.   HENT:      Head: Normocephalic and atraumatic.   Cardiovascular:      Rate and Rhythm: Normal rate and regular rhythm.      Pulses: Normal pulses.      Heart sounds: Normal heart sounds. No murmur heard.     No friction rub. No gallop.   Pulmonary:      Effort: Pulmonary effort is normal. No respiratory distress.      Breath sounds: Normal breath sounds. No stridor. No wheezing, rhonchi or rales.   Chest:      Chest wall: No tenderness.   Musculoskeletal:      Cervical back: Neck supple. No tenderness.   Lymphadenopathy:      Cervical: No cervical adenopathy.   Skin:     General: Skin is warm and dry.      Capillary Refill: Capillary refill takes less than 2 seconds.   Neurological:      General: No focal deficit present.      Mental Status: She is alert and oriented to person, place, and time. Mental status is at baseline.      Cranial Nerves: No cranial nerve deficit.      Motor: No weakness.      Gait: Gait normal.   Psychiatric:         Mood and Affect: Mood normal.         Behavior: Behavior normal.         Thought Content: Thought content normal.         Judgment: Judgment normal.         Assessment/Plan:     Diagnosis and associated orders:   1. Ellie  POCT Glucose         Results for orders placed or performed in visit on 02/08/24   POCT Glucose   Result Value Ref Range    Glucose - Accu-Ck 140 (A) 65 - 99 mg/dL          Comments/MDM:   Pt is clinically stable at today's acute urgent care visit.  No acute distress noted. Appropriate for outpatient management at this time. "     Acute problem.  Patient presents today for episode of shaking that resolved after eating for apricots.  She does have underlying history of diabetes but does not check her blood sugars routinely.  Vital signs are stable in clinic.  POC glucose 140.  Neurological exam is reassuring.  At this time I have advised patient to monitor her symptoms and have given patient's strict ER precautions for any worsening in her condition.  She is to follow-up with PCP tomorrow as scheduled.  Attempted to perform UA in clinic, patient unable to provide urine sample today.           Discussed DDx, management options (risks,benefits, and alternatives to planned treatment), natural progression and supportive care.  Expressed understanding and the treatment plan was agreed upon. Questions were encouraged and answered   Return to urgent care prn if new or worsening sx or if there is no improvement in condition prn.    Educated in Red flags and indications to immediately call 911 or present to the Emergency Department.   Advised the patient to follow-up with the primary care physician for recheck, reevaluation, and consideration of further management.    I personally reviewed prior external notes and test results pertinent to today's visit.  I have independently reviewed and interpreted all diagnostics ordered during this urgent care acute visit.       Please note that this dictation was created using voice recognition software. I have made a reasonable attempt to correct obvious errors, but I expect that there are errors of grammar and possibly content that I did not discover before finalizing the note.    This note was electronically signed by BIRGIT Crandall

## 2024-02-08 NOTE — TELEPHONE ENCOUNTER
Scheduling called and the patient is not taking her diabetic meds correctly and she now feels dizzy and tingly.  I spoke with Jacklyn and she advised her to go to urgent care of the ER as we have no appointments today and the patient doesn't want to wait until tomorrow.    I informed scheduling to find a spot at [facility] or another facility if the patient will not go to the ER or urgent care.    ROMULO    Thank you   Gayle

## 2024-02-08 NOTE — TELEPHONE ENCOUNTER
"At the request of the  and area practice manager Paulino Mcwilliams, I contacted this patient to discuss the symptoms they reported to scheduling and next steps. The patient had called scheduling to try to get in to see her PCP immediately but was advised by scheduling that there were no appointments available and that she should go to urgent care or the emergency room for assessment.  The patient was en route [facility]. She was being driven by her sister She reports \"shaking\" and \"feeling out of it.\" The patient is diabetic but states that they did not check their blood sugar when she noticed the symptoms but took a second Metformin. The patient states they have eaten today. They were alert and oriented but seemed frazzled. The patient states that their A1C has been at 6.3 and 6.5 her last two checks, which is confirmed in her chart. Based on this she and her PCP, Dr. Waters, decided to lower the dosage of her Metformin. I reinforced that she needed to be seen and evaluated as soon as possible. I got her scheduled for an appointment with her primary care provider tomorrow afternoon to further discuss the dosage of her Metformin.   "

## 2024-02-09 ENCOUNTER — OFFICE VISIT (OUTPATIENT)
Dept: MEDICAL GROUP | Facility: PHYSICIAN GROUP | Age: 79
End: 2024-02-09
Payer: MEDICARE

## 2024-02-09 VITALS
TEMPERATURE: 97.3 F | OXYGEN SATURATION: 98 % | DIASTOLIC BLOOD PRESSURE: 80 MMHG | HEIGHT: 64 IN | WEIGHT: 173 LBS | SYSTOLIC BLOOD PRESSURE: 124 MMHG | BODY MASS INDEX: 29.53 KG/M2 | HEART RATE: 83 BPM

## 2024-02-09 DIAGNOSIS — R25.1 SHAKING: ICD-10-CM

## 2024-02-09 DIAGNOSIS — R42 LIGHTHEADEDNESS: ICD-10-CM

## 2024-02-09 PROCEDURE — 3074F SYST BP LT 130 MM HG: CPT | Performed by: FAMILY MEDICINE

## 2024-02-09 PROCEDURE — 3079F DIAST BP 80-89 MM HG: CPT | Performed by: FAMILY MEDICINE

## 2024-02-09 PROCEDURE — 99213 OFFICE O/P EST LOW 20 MIN: CPT | Performed by: FAMILY MEDICINE

## 2024-02-09 ASSESSMENT — ENCOUNTER SYMPTOMS
POLYDIPSIA: 1
BLURRED VISION: 0
CHILLS: 0
TINGLING: 0
SENSORY CHANGE: 0
FOCAL WEAKNESS: 0
FEVER: 0
DIZZINESS: 0
PHOTOPHOBIA: 0
WEAKNESS: 0
PALPITATIONS: 0
TREMORS: 0
DOUBLE VISION: 0
SPEECH CHANGE: 0
HEADACHES: 0
SHORTNESS OF BREATH: 0

## 2024-02-09 ASSESSMENT — FIBROSIS 4 INDEX: FIB4 SCORE: 2.62

## 2024-02-09 NOTE — PROGRESS NOTES
"Subjective:     CC: urgent care follow up    HPI:   Kristin presents today with UC follow up    2/8i/2024 UC  Seen for an episode of shakiness.  Developed the morning of presentation to urgent care with some slight weakness.  Improved shortly after eating apricots  POC glucose 140    Became lightheaded around 1 pm  -had shaking with this as well  --ate some apricots and her symptoms improved  -did not check her sugars at this time  Occurred after standing for a period of time  Also felt \"something with my stomach\"  Had not yet taken her metformin yet that day or her other medications (including HTN medications)  -took 2 metformin later in the day  --is normally on 1 tab BID  Pt did have breakfast yesterday  No repeat shaking or lightheadedness since this episode yesterday  Pt has been having anxiety about her weight    Health Maintenance: Completed    ROS:  Review of Systems   Constitutional:  Negative for chills and fever.   Respiratory:  Negative for shortness of breath.    Cardiovascular:  Negative for chest pain.       Objective:     Exam:  /80   Pulse 83   Temp 36.3 °C (97.3 °F) (Temporal)   Ht 1.626 m (5' 4\")   Wt 78.5 kg (173 lb)   SpO2 98%   BMI 29.70 kg/m²  Body mass index is 29.7 kg/m².    Physical Exam  Constitutional:       Appearance: Normal appearance.   Cardiovascular:      Rate and Rhythm: Normal rate and regular rhythm.      Heart sounds: Normal heart sounds.   Pulmonary:      Effort: Pulmonary effort is normal.      Breath sounds: Normal breath sounds.   Musculoskeletal:      Cervical back: Normal range of motion and neck supple.   Neurological:      Mental Status: She is alert.             Assessment & Plan:     78 y.o. female with the following -       1. Lightheadedness  2. Shaking  Acute.  Yesterday, she admits that she forgot to take her morning medications and was on her feet for about 2-3 hours working on some chores when she suddenly felt lightheaded and shaky and remember that she " did not take any of her medications.  She then took 1000 mg of metformin when her normal dose is 500 mg and ate some apricots.  She did not check her sugars at home and was seen in urgent care where her sugar was 140.  Metformin has a peak plasma time of 2-3 hours so I do not think she had a significantly elevated blood glucose as it would not of lower 240 by the time she saw urgent care.  Additionally, I am not certain she actually had hypoglycemia as she had missed her morning metformin dose and it is extremely rare to get hypoglycemia metformin.  It is possible this was blood pressure related as she missed all of her morning medications though her blood pressure was only just slightly elevated in urgent care.  Therefore, for now we are just going to monitor and leave her on her current regimen of medications.    Return if symptoms worsen or fail to improve.    Please note that this dictation was created using voice recognition software. I have made every reasonable attempt to correct obvious errors, but I expect that there are errors of grammar and possibly content that I did not discover before finalizing the note.

## 2024-02-17 DIAGNOSIS — E03.4 HYPOTHYROIDISM DUE TO ACQUIRED ATROPHY OF THYROID: ICD-10-CM

## 2024-02-20 RX ORDER — LEVOTHYROXINE SODIUM 0.07 MG/1
TABLET ORAL
Qty: 90 TABLET | Refills: 0 | Status: SHIPPED | OUTPATIENT
Start: 2024-02-20

## 2024-02-20 NOTE — TELEPHONE ENCOUNTER
Received request via: Pharmacy    Was the patient seen in the last year in this department? Yes    Does the patient have an active prescription (recently filled or refills available) for medication(s) requested? No    Pharmacy Name:     Does the patient have assisted Plus and need 100 day supply (blood pressure, diabetes and cholesterol meds only)? Patient does not have SCP

## 2024-02-21 ENCOUNTER — HOSPITAL ENCOUNTER (OUTPATIENT)
Dept: RADIOLOGY | Facility: MEDICAL CENTER | Age: 79
End: 2024-02-21
Attending: FAMILY MEDICINE
Payer: MEDICARE

## 2024-02-21 DIAGNOSIS — Z12.31 SCREENING MAMMOGRAM FOR BREAST CANCER: ICD-10-CM

## 2024-02-21 PROCEDURE — 77067 SCR MAMMO BI INCL CAD: CPT

## 2024-02-23 ENCOUNTER — OFFICE VISIT (OUTPATIENT)
Dept: MEDICAL GROUP | Facility: PHYSICIAN GROUP | Age: 79
End: 2024-02-23
Payer: MEDICARE

## 2024-02-23 VITALS
BODY MASS INDEX: 29.26 KG/M2 | TEMPERATURE: 97.3 F | DIASTOLIC BLOOD PRESSURE: 80 MMHG | HEIGHT: 64 IN | WEIGHT: 171.4 LBS | HEART RATE: 92 BPM | SYSTOLIC BLOOD PRESSURE: 156 MMHG | OXYGEN SATURATION: 96 %

## 2024-02-23 DIAGNOSIS — F41.9 ANXIETY: ICD-10-CM

## 2024-02-23 DIAGNOSIS — G56.03 BILATERAL CARPAL TUNNEL SYNDROME: Primary | ICD-10-CM

## 2024-02-23 DIAGNOSIS — E03.4 HYPOTHYROIDISM DUE TO ACQUIRED ATROPHY OF THYROID: ICD-10-CM

## 2024-02-23 DIAGNOSIS — Z01.818 PREOP EXAMINATION: ICD-10-CM

## 2024-02-23 PROBLEM — Z86.718 HISTORY OF DVT (DEEP VEIN THROMBOSIS): Status: ACTIVE | Noted: 2024-02-23

## 2024-02-23 PROCEDURE — 99214 OFFICE O/P EST MOD 30 MIN: CPT | Performed by: FAMILY MEDICINE

## 2024-02-23 PROCEDURE — 3079F DIAST BP 80-89 MM HG: CPT | Performed by: FAMILY MEDICINE

## 2024-02-23 PROCEDURE — 3077F SYST BP >= 140 MM HG: CPT | Performed by: FAMILY MEDICINE

## 2024-02-23 RX ORDER — CITALOPRAM HYDROBROMIDE 10 MG/1
10 TABLET ORAL DAILY
Qty: 90 TABLET | Refills: 0 | Status: SHIPPED | OUTPATIENT
Start: 2024-02-23

## 2024-02-23 ASSESSMENT — FIBROSIS 4 INDEX: FIB4 SCORE: 2.62

## 2024-02-23 NOTE — PROGRESS NOTES
REASON FOR VISIT: Pre-Op Consultation  Consultation Requested by: Dr. Williamson  Procedure date and type: Right carpal tunnel release  Inherent cardiac risk of procedure: low    History of condition for which surgery is planned: Present for years, worse in the last year.    Current chronic conditions: does not have any pertinent problems on file.    Past medical history:  has a past medical history of Allergy, Ankle fracture (2012), Blood clotting disorder (HCC), Cancer (HCC), Cataract, Colitis (2020), Dental disorder, Diabetes (HCC), Disorder of thyroid, GERD (gastroesophageal reflux disease), Hemorrhagic disorder (HCC), Hiatus hernia syndrome, High cholesterol, and Hypertension.. Negative for: CAD, SBE, CVA, TIA, PE, bleeding requiring transfusion, intubation.  Positive for: DVT - provoked    Surgical and anesthetic history:  has a past surgical history that includes lumpectomy (Right); cholecystectomy (1995?); abdominal hysterectomy total (1970?); other; turbt (transurethral resection of bladder tumor) (05/28/2021); and cataract extraction with iol (Bilateral, 08/2023). Prior surgery without complication, bleeding, reaction to anesthetic, prolonged recovery    Habits:   Social History     Tobacco Use    Smoking status: Never    Smokeless tobacco: Never   Vaping Use    Vaping Use: Never used   Substance Use Topics    Alcohol use: Not Currently     Comment: every couple of years 1 drink     Drug use: Never       Allergies: Lisinopril, Warfarin, and Oxycodone No known allergy to Anesthetic, or Latex.       Current medicines:   Current Outpatient Medications   Medication Sig Dispense Refill    citalopram (CELEXA) 10 MG tablet Take 1 Tablet by mouth every day. 90 Tablet 0    levothyroxine (SYNTHROID) 75 MCG Tab TAKE 1 TABLET BY MOUTH IN THE  MORNING ON AN EMPTY STOMACH 90 Tablet 0    telmisartan (MICARDIS) 80 MG Tab TAKE 1 TABLET BY MOUTH AT  BEDTIME 90 Tablet 0    carvedilol (COREG) 12.5 MG Tab TAKE 1 TABLET BY MOUTH  "TWICE  DAILY WITH MEALS 180 Tablet 0    ciprofloxacin (CILOXIN) 0.3 % Solution 1 drop of solution on toe nail three times a day for 2 weeks. 5 mL 0    omeprazole (PRILOSEC) 20 MG delayed-release capsule TAKE 1 CAPSULE BY MOUTH  TWICE DAILY 180 Capsule 3    atorvastatin (LIPITOR) 40 MG Tab TAKE 1 TABLET BY MOUTH  DAILY 90 Tablet 3    metFORMIN ER (GLUCOPHAGE XR) 500 MG TABLET SR 24 HR TAKE 2 TABLETS BY MOUTH IN  THE MORNING AND 1 TABLET BY MOUTH IN THE EVENING (Patient taking differently: Take 500 mg by mouth 2 times a day.) 270 Tablet 3    prazosin (MINIPRESS) 1 MG Cap TAKE 1 CAPSULE BY MOUTH 3 TIMES  DAILY AS NEEDED FOR BLOOD  PRESSURE SPIKES, IF SBP&gt;160 OR  DBP &gt;100 FOR UP TO 90 DAYS 200 Capsule 0    Ferrous Sulfate (IRON PO) Take 1 tablet by mouth every day. OTC       No current facility-administered medications for this visit.       Anticoagulant: NSAIDs - occasional   Herbals: Denies - Black Cohash, Echinacea, Garlic, Ginger, Ginkgo Biloba, Ginseng, Kava, Marc’s Wort,  Saw Palmetto, Valerian               Patel Activity Status Index: 39.45  METS: 7.59 megs - moderate functional capacity    ASA Class: 2    ROS: negative for: CP, SOB, FALK, Orthopnea, wheezing, leg edema, polydipsia, polyuria, fevers, chills, sweats, cough, cold, congestion, abd pain, reflux, black or bloody stools, weight loss/gain.    PHYSICAL EXAMINATION:  VITAL SIGNS: BP (!) 156/80   Pulse 92   Temp 36.3 °C (97.3 °F) (Temporal)   Ht 1.626 m (5' 4\")   Wt 77.7 kg (171 lb 6.4 oz)   SpO2 96%  Body mass index is 29.42 kg/m².  HEENT: EOMI, PERRL. Oropharynx pink, moist. Normal airway. Neck supple, no cervical lymphadenopathy.  LUNGS: CTAB good excursion.   HEART: RRR no murmur.  ABDOMEN: soft, nondistended, nontender normal BS. No HSM.  LOWER EXTREMITIES: warm and well perfused with no edema.    Labs: per surgeon    IMPRESSION:  The primary encounter diagnosis was Bilateral carpal tunnel syndrome. Diagnoses of Preop examination, " Hypothyroidism due to acquired atrophy of thyroid, and Anxiety were also pertinent to this visit.  Planned surgery: right carpal tunnel release   High risk medical conditions: negative for Cardiac, Pulmonary, Bleeding, Poor healing, Thrombosis, Debility    PLAN:  Chronic medical conditions: Stable and controlled. Continue current medicines.   Avoid drugs that potentiate bleeding as advised by surgeon  Discontinue all herbal supplements 2 weeks prior to surgery.  Need for SBE prophylaxis: yes  This patient is considered Very Low risk for cardiopulmonary complications for this planned surgery by the Jet index  Start citalopram 10 mg daily for uncontrolled anxiety  TSH ordered to monitor levothyroxine    Jet Index for assessing perioperative cardiovascular risk [Circulation 1999;100:1047]:   (one point each for * high-risk surgery [ intrathoracic, suprainguinal vascular, intraperitoneal ],* Hx ischemic heart dz, * Hx CHF, *Hx TIA or CVA, *IDDM, *Serum Cr >2.0)   Interpretation of risk: (Complication = MI, Pulm edema, v-fib or cardiac arrest, complete heart block)  Very Low: 0 points = 0.4 % complication. Low: 1 point = 0.9 %, Moderate: 2 points = 6.6 %, High: 3+ points = 11%.

## 2024-02-26 ENCOUNTER — OFFICE VISIT (OUTPATIENT)
Dept: MEDICAL GROUP | Facility: PHYSICIAN GROUP | Age: 79
End: 2024-02-26
Payer: MEDICARE

## 2024-02-26 ENCOUNTER — HOSPITAL ENCOUNTER (OUTPATIENT)
Facility: MEDICAL CENTER | Age: 79
End: 2024-02-26
Attending: NURSE PRACTITIONER
Payer: MEDICARE

## 2024-02-26 VITALS
HEIGHT: 64 IN | DIASTOLIC BLOOD PRESSURE: 80 MMHG | OXYGEN SATURATION: 97 % | BODY MASS INDEX: 29.26 KG/M2 | HEART RATE: 85 BPM | SYSTOLIC BLOOD PRESSURE: 154 MMHG | WEIGHT: 171.4 LBS | TEMPERATURE: 97.4 F

## 2024-02-26 DIAGNOSIS — N30.00 ACUTE CYSTITIS WITHOUT HEMATURIA: ICD-10-CM

## 2024-02-26 DIAGNOSIS — E11.29 TYPE 2 DIABETES MELLITUS WITH MICROALBUMINURIA, WITHOUT LONG-TERM CURRENT USE OF INSULIN (HCC): ICD-10-CM

## 2024-02-26 DIAGNOSIS — R80.9 TYPE 2 DIABETES MELLITUS WITH MICROALBUMINURIA, WITHOUT LONG-TERM CURRENT USE OF INSULIN (HCC): ICD-10-CM

## 2024-02-26 DIAGNOSIS — R30.0 DYSURIA: ICD-10-CM

## 2024-02-26 LAB
APPEARANCE UR: NORMAL
BILIRUB UR STRIP-MCNC: NEGATIVE MG/DL
COLOR UR AUTO: NORMAL
CREAT UR-MCNC: 181.64 MG/DL
GLUCOSE UR STRIP.AUTO-MCNC: NEGATIVE MG/DL
KETONES UR STRIP.AUTO-MCNC: NEGATIVE MG/DL
LEUKOCYTE ESTERASE UR QL STRIP.AUTO: NORMAL
MICROALBUMIN UR-MCNC: 13.1 MG/DL
MICROALBUMIN/CREAT UR: 72 MG/G (ref 0–30)
NITRITE UR QL STRIP.AUTO: NEGATIVE
PH UR STRIP.AUTO: 5.5 [PH] (ref 5–8)
PROT UR QL STRIP: NORMAL MG/DL
RBC UR QL AUTO: NEGATIVE
SP GR UR STRIP.AUTO: 1.02
UROBILINOGEN UR STRIP-MCNC: NORMAL MG/DL

## 2024-02-26 PROCEDURE — 82570 ASSAY OF URINE CREATININE: CPT

## 2024-02-26 PROCEDURE — 81002 URINALYSIS NONAUTO W/O SCOPE: CPT | Performed by: NURSE PRACTITIONER

## 2024-02-26 PROCEDURE — 87077 CULTURE AEROBIC IDENTIFY: CPT

## 2024-02-26 PROCEDURE — 99214 OFFICE O/P EST MOD 30 MIN: CPT | Performed by: NURSE PRACTITIONER

## 2024-02-26 PROCEDURE — 82043 UR ALBUMIN QUANTITATIVE: CPT

## 2024-02-26 PROCEDURE — 87086 URINE CULTURE/COLONY COUNT: CPT

## 2024-02-26 PROCEDURE — 3077F SYST BP >= 140 MM HG: CPT | Performed by: NURSE PRACTITIONER

## 2024-02-26 PROCEDURE — 3079F DIAST BP 80-89 MM HG: CPT | Performed by: NURSE PRACTITIONER

## 2024-02-26 RX ORDER — SULFAMETHOXAZOLE AND TRIMETHOPRIM 800; 160 MG/1; MG/1
1 TABLET ORAL 2 TIMES DAILY
Qty: 10 TABLET | Refills: 0 | Status: SHIPPED | OUTPATIENT
Start: 2024-02-26 | End: 2024-03-02

## 2024-02-26 ASSESSMENT — FIBROSIS 4 INDEX: FIB4 SCORE: 2.62

## 2024-02-26 NOTE — PROGRESS NOTES
"Chief Complaint   Patient presents with    UTI     2/25/24        HPI:  Symptom onset: 1 days ago   Current symptoms: Painful, urgent, frequent voids. No blood noted in urine.  States chronic UTI did follow-up with urology previously, was taking post-coital prophylaxis in the past  Since onset symptoms are: Unchanged  Treatments tried: OTC antispasm med.  Associated symptoms: Negative for fever, +flank pain across back, no nausea and vomiting, vaginal discharge, pelvic pain.  History is positive for frequent UTI. Last UTI in 10/23'    Type 2 diabetes is chronic in nature and stable.  Patient is due for microalbumin creatinine screening as well as lipid panel which are ordered today.    ROS:  Denies fever, chills, vomiting or abdominal pain.     OBJECTIVE:  BP (!) 154/80 (BP Location: Left arm, Patient Position: Sitting, BP Cuff Size: Adult)   Pulse 85   Temp 36.3 °C (97.4 °F) (Temporal)   Ht 1.626 m (5' 4\")   Wt 77.7 kg (171 lb 6.4 oz)   SpO2 97%   Gen: Alert, NAD.  Chest: Lungs clear to auscultation, CV RRR.  Abdomen: Soft, tender in suprapubic region. No CVAT. Normal bowel sounds.     Lab Results   Component Value Date    POCCOLOR OTHER 02/26/2024    POCAPPEAR CLOUDY 02/26/2024    POCLEUKEST SMALL 02/26/2024    POCNITRITE NEGATIVE 02/26/2024    POCUROBILIGE 0.2 E.U./DL 02/26/2024    POCPROTEIN 30 MG/DL 02/26/2024    POCURPH 5.5 02/26/2024    POCBLOOD NEGATIVE 02/26/2024    POCSPGRV 1.025 02/26/2024    POCKETONES NEGATIVE 02/26/2024    POCBILIRUBIN NEGATIVE 02/26/2024    POCGLUCUA NEGATIVE 02/26/2024          ASSESSMENT/PLAN:     1. Dysuria    2. Type 2 diabetes mellitus with microalbuminuria, without long-term current use of insulin (HCC)    3. Acute cystitis without hematuria          1. Abnormal urine dipstick in office. Urine sent for culture. Start antibiotics, Bactrim 800/160 mg PO twice daily for 5 days  2. Provided education to drink plenty of fluids, wipe front to back every void and bowel movement. "   3. Return to clinic if symptoms not improving within 3-4 days or in case of vomiting, fever, increasing pain.  4. Complete Lipid panel with TSH.  5. Microalbumin creatinine ratio ordered today, continue metformin 500 mg PO twice daily  6.follow closely with Dr. Hooper regarding blood pressure, continue medication as prescribed.

## 2024-02-28 LAB
BACTERIA UR CULT: NORMAL
SIGNIFICANT IND 70042: NORMAL
SITE SITE: NORMAL
SOURCE SOURCE: NORMAL

## 2024-03-01 ENCOUNTER — OFFICE VISIT (OUTPATIENT)
Dept: URGENT CARE | Facility: CLINIC | Age: 79
End: 2024-03-01
Payer: MEDICARE

## 2024-03-01 ENCOUNTER — HOSPITAL ENCOUNTER (OUTPATIENT)
Facility: MEDICAL CENTER | Age: 79
End: 2024-03-01
Attending: PHYSICIAN ASSISTANT
Payer: MEDICARE

## 2024-03-01 VITALS
TEMPERATURE: 97.8 F | DIASTOLIC BLOOD PRESSURE: 78 MMHG | HEIGHT: 64 IN | HEART RATE: 68 BPM | WEIGHT: 168 LBS | BODY MASS INDEX: 28.68 KG/M2 | SYSTOLIC BLOOD PRESSURE: 128 MMHG | OXYGEN SATURATION: 98 % | RESPIRATION RATE: 18 BRPM

## 2024-03-01 DIAGNOSIS — R39.9 LOWER URINARY TRACT SYMPTOMS: ICD-10-CM

## 2024-03-01 DIAGNOSIS — N30.01 ACUTE CYSTITIS WITH HEMATURIA: ICD-10-CM

## 2024-03-01 LAB
APPEARANCE UR: NORMAL
BILIRUB UR STRIP-MCNC: NEGATIVE MG/DL
COLOR UR AUTO: YELLOW
GLUCOSE UR STRIP.AUTO-MCNC: NEGATIVE MG/DL
KETONES UR STRIP.AUTO-MCNC: NEGATIVE MG/DL
LEUKOCYTE ESTERASE UR QL STRIP.AUTO: NORMAL
NITRITE UR QL STRIP.AUTO: NEGATIVE
PH UR STRIP.AUTO: 5.5 [PH] (ref 5–8)
PROT UR QL STRIP: 100 MG/DL
RBC UR QL AUTO: NORMAL
SP GR UR STRIP.AUTO: 1.03
UROBILINOGEN UR STRIP-MCNC: 0.2 MG/DL

## 2024-03-01 PROCEDURE — 87186 SC STD MICRODIL/AGAR DIL: CPT

## 2024-03-01 PROCEDURE — 99213 OFFICE O/P EST LOW 20 MIN: CPT | Performed by: PHYSICIAN ASSISTANT

## 2024-03-01 PROCEDURE — 81002 URINALYSIS NONAUTO W/O SCOPE: CPT | Performed by: PHYSICIAN ASSISTANT

## 2024-03-01 PROCEDURE — 3078F DIAST BP <80 MM HG: CPT | Performed by: PHYSICIAN ASSISTANT

## 2024-03-01 PROCEDURE — 87086 URINE CULTURE/COLONY COUNT: CPT

## 2024-03-01 PROCEDURE — 87077 CULTURE AEROBIC IDENTIFY: CPT

## 2024-03-01 PROCEDURE — 3074F SYST BP LT 130 MM HG: CPT | Performed by: PHYSICIAN ASSISTANT

## 2024-03-01 RX ORDER — CEFDINIR 300 MG/1
300 CAPSULE ORAL EVERY 12 HOURS
Qty: 10 CAPSULE | Refills: 0 | Status: SHIPPED | OUTPATIENT
Start: 2024-03-01 | End: 2024-03-06

## 2024-03-01 ASSESSMENT — ENCOUNTER SYMPTOMS
NAUSEA: 0
FEVER: 0
VOMITING: 0
FLANK PAIN: 0
DIZZINESS: 0
CHILLS: 0
ABDOMINAL PAIN: 1

## 2024-03-01 ASSESSMENT — FIBROSIS 4 INDEX: FIB4 SCORE: 2.62

## 2024-03-01 NOTE — PROGRESS NOTES
Subjective     Rebecca Barrios is a 78 y.o. female who presents with UTI (Sx x on going )    HPI:  Kristin Barrios is a 78 y.o. female who presents today for evaluation of what feels like a urinary tract infection.  Patient actually saw her primary care provider on 2/26 for same symptoms.  At that point she had had 1 to 2 days of painful urination with urgency/frequency.  She was started on sulfamethoxazole-trimethoprim.  Urine culture, ever, came back negative.  Patient reports that she has continued taking the antibiotics and her symptoms actually seem to be worsening.  Still denies any fever/chills or nausea/vomiting.    Review of Systems   Constitutional:  Negative for chills and fever.   Gastrointestinal:  Positive for abdominal pain. Negative for nausea and vomiting.   Genitourinary:  Positive for dysuria, frequency and urgency. Negative for flank pain and hematuria.   Neurological:  Negative for dizziness.           PMH:  has a past medical history of Allergy, Ankle fracture (2012), Blood clotting disorder ([facility]), Cancer ([facility]), Cataract, Colitis (2020), Dental disorder, Diabetes ([facility]), Disorder of thyroid, GERD (gastroesophageal reflux disease), Hemorrhagic disorder ([facility]), Hiatus hernia syndrome, High cholesterol, and Hypertension.  MEDS:   Current Outpatient Medications:   •  cefdinir (OMNICEF) 300 MG Cap, Take 1 Capsule by mouth every 12 hours for 5 days., Disp: 10 Capsule, Rfl: 0  •  sulfamethoxazole-trimethoprim (BACTRIM DS) 800-160 MG tablet, Take 1 Tablet by mouth 2 times a day for 5 days., Disp: 10 Tablet, Rfl: 0  •  citalopram (CELEXA) 10 MG tablet, Take 1 Tablet by mouth every day., Disp: 90 Tablet, Rfl: 0  •  levothyroxine (SYNTHROID) 75 MCG Tab, TAKE 1 TABLET BY MOUTH IN THE  MORNING ON AN EMPTY STOMACH, Disp: 90 Tablet, Rfl: 0  •  telmisartan (MICARDIS) 80 MG Tab, TAKE 1 TABLET BY MOUTH AT  BEDTIME, Disp: 90 Tablet, Rfl: 0  •  carvedilol (COREG) 12.5 MG Tab, TAKE 1 TABLET BY MOUTH TWICE  DAILY  "WITH MEALS, Disp: 180 Tablet, Rfl: 0  •  ciprofloxacin (CILOXIN) 0.3 % Solution, 1 drop of solution on toe nail three times a day for 2 weeks., Disp: 5 mL, Rfl: 0  •  omeprazole (PRILOSEC) 20 MG delayed-release capsule, TAKE 1 CAPSULE BY MOUTH  TWICE DAILY, Disp: 180 Capsule, Rfl: 3  •  atorvastatin (LIPITOR) 40 MG Tab, TAKE 1 TABLET BY MOUTH  DAILY, Disp: 90 Tablet, Rfl: 3  •  metFORMIN ER (GLUCOPHAGE XR) 500 MG TABLET SR 24 HR, TAKE 2 TABLETS BY MOUTH IN  THE MORNING AND 1 TABLET BY MOUTH IN THE EVENING (Patient taking differently: Take 500 mg by mouth 2 times a day.), Disp: 270 Tablet, Rfl: 3  •  prazosin (MINIPRESS) 1 MG Cap, TAKE 1 CAPSULE BY MOUTH 3 TIMES  DAILY AS NEEDED FOR BLOOD  PRESSURE SPIKES, IF SBP&gt;160 OR  DBP &gt;100 FOR UP TO 90 DAYS, Disp: 200 Capsule, Rfl: 0  •  Ferrous Sulfate (IRON PO), Take 1 tablet by mouth every day. OTC, Disp: , Rfl:   ALLERGIES:   Allergies   Allergen Reactions   • Lisinopril Cough     Cough 2/6/2008   • Warfarin      1998-10-13;BLEEDING   • Oxycodone Vomiting and Nausea     SURGHX:   Past Surgical History:   Procedure Laterality Date   • CATARACT EXTRACTION WITH IOL Bilateral 08/2023   • TURBT (TRANSURETHRAL RESECTION OF BLADDER TUMOR)  05/28/2021    Procedure: TURBT (TRANSURETHRAL RESECTION OF BLADDER TUMOR) - BIPOLAR.;  Surgeon: Dave Arteaga M.D.;  Location: SURGERY [facility];  Service: Urology   • ABDOMINAL HYSTERECTOMY TOTAL  1970?   • CHOLECYSTECTOMY  1995?   • LUMPECTOMY Right     benign   • OTHER      colonoscopy X2     SOCHX:  reports that she has never smoked. She has never used smokeless tobacco. She reports that she does not currently use alcohol. She reports that she does not use drugs.  FH: Family history was reviewed, no pertinent findings to report        Objective     /78 (BP Location: Left arm, Patient Position: Sitting, BP Cuff Size: Large adult)   Pulse 68   Temp 36.6 °C (97.8 °F) (Temporal)   Resp 18   Ht 1.626 m (5' 4\")   Wt 76.2 kg " (168 lb)   SpO2 98%   BMI 28.84 kg/m²      Physical Exam  Constitutional:       General: She is not in acute distress.     Appearance: She is not diaphoretic.   HENT:      Head: Normocephalic and atraumatic.      Right Ear: External ear normal.      Left Ear: External ear normal.   Eyes:      Conjunctiva/sclera: Conjunctivae normal.      Pupils: Pupils are equal, round, and reactive to light.   Pulmonary:      Effort: Pulmonary effort is normal. No respiratory distress.   Abdominal:      Tenderness: There is no abdominal tenderness. There is no right CVA tenderness or left CVA tenderness.   Musculoskeletal:      Cervical back: Normal range of motion.   Skin:     Findings: No rash.   Neurological:      Mental Status: She is alert and oriented to person, place, and time.   Psychiatric:         Mood and Affect: Mood and affect normal.         Cognition and Memory: Memory normal.         Judgment: Judgment normal.          POCT Urinalysis  Lab Results   Component Value Date/Time    POCCOLOR YELLOW 03/01/2024 08:31 AM    POCAPPEAR CLOUDY 03/01/2024 08:31 AM    POCLEUKEST LARGE 03/01/2024 08:31 AM    POCNITRITE NEGATIVE 03/01/2024 08:31 AM    POCUROBILIGE 0.2 03/01/2024 08:31 AM    POCPROTEIN 100 03/01/2024 08:31 AM    POCURPH 5.5 03/01/2024 08:31 AM    POCBLOOD LARGE 03/01/2024 08:31 AM    POCSPGRV 1.030 03/01/2024 08:31 AM    POCKETONES NEGATIVE 03/01/2024 08:31 AM    POCBILIRUBIN NEGATIVE 03/01/2024 08:31 AM    POCGLUCUA NEGATIVE 03/01/2024 08:31 AM          Assessment & Plan       1. Lower urinary tract symptoms  - POCT Urinalysis    2. Acute cystitis with hematuria  - Urine Culture; Future  - cefdinir (OMNICEF) 300 MG Cap; Take 1 Capsule by mouth every 12 hours for 5 days.  Dispense: 10 Capsule; Refill: 0       Urine culture was negative a few days ago but her symptoms have been worsening and her urinalysis today shows more positive findings on the when she had performed on 2/26.  We will have her stop the Bactrim.   Will switch to cefdinir for now.  New urine culture will be obtained.  Also discussed that she should try to call her urology office to make an appointment for follow-up.          Differential Diagnosis, natural history, and supportive care discussed. Return to the Urgent Care or follow up with your PCP if symptoms fail to resolve, or for any new or worsening symptoms. Emergency room precautions discussed. Patient and/or family appears understanding of information.

## 2024-03-03 LAB
BACTERIA UR CULT: ABNORMAL
BACTERIA UR CULT: ABNORMAL
SIGNIFICANT IND 70042: ABNORMAL
SITE SITE: ABNORMAL
SOURCE SOURCE: ABNORMAL

## 2024-03-07 ENCOUNTER — HOSPITAL ENCOUNTER (OUTPATIENT)
Facility: MEDICAL CENTER | Age: 79
End: 2024-03-07
Attending: PHYSICIAN ASSISTANT
Payer: MEDICARE

## 2024-03-07 PROCEDURE — 87086 URINE CULTURE/COLONY COUNT: CPT

## 2024-03-10 LAB
BACTERIA UR CULT: NORMAL
SIGNIFICANT IND 70042: NORMAL
SITE SITE: NORMAL
SOURCE SOURCE: NORMAL

## 2024-03-15 ENCOUNTER — APPOINTMENT (OUTPATIENT)
Dept: RADIOLOGY | Facility: MEDICAL CENTER | Age: 79
End: 2024-03-15
Attending: EMERGENCY MEDICINE
Payer: MEDICARE

## 2024-03-15 ENCOUNTER — HOSPITAL ENCOUNTER (OUTPATIENT)
Facility: MEDICAL CENTER | Age: 79
End: 2024-03-15
Attending: PHYSICIAN ASSISTANT
Payer: MEDICARE

## 2024-03-15 ENCOUNTER — HOSPITAL ENCOUNTER (EMERGENCY)
Facility: MEDICAL CENTER | Age: 79
End: 2024-03-16
Attending: EMERGENCY MEDICINE
Payer: MEDICARE

## 2024-03-15 ENCOUNTER — OFFICE VISIT (OUTPATIENT)
Dept: URGENT CARE | Facility: CLINIC | Age: 79
End: 2024-03-15
Payer: MEDICARE

## 2024-03-15 VITALS
RESPIRATION RATE: 14 BRPM | WEIGHT: 169 LBS | SYSTOLIC BLOOD PRESSURE: 136 MMHG | HEART RATE: 80 BPM | DIASTOLIC BLOOD PRESSURE: 78 MMHG | TEMPERATURE: 98.6 F | BODY MASS INDEX: 28.85 KG/M2 | HEIGHT: 64 IN | OXYGEN SATURATION: 98 %

## 2024-03-15 DIAGNOSIS — N30.00 ACUTE CYSTITIS WITHOUT HEMATURIA: ICD-10-CM

## 2024-03-15 DIAGNOSIS — M79.10 MYALGIA: ICD-10-CM

## 2024-03-15 DIAGNOSIS — R11.2 NAUSEA AND VOMITING, UNSPECIFIED VOMITING TYPE: ICD-10-CM

## 2024-03-15 DIAGNOSIS — R51.9 NONINTRACTABLE HEADACHE, UNSPECIFIED CHRONICITY PATTERN, UNSPECIFIED HEADACHE TYPE: ICD-10-CM

## 2024-03-15 DIAGNOSIS — R30.0 DYSURIA: ICD-10-CM

## 2024-03-15 DIAGNOSIS — N39.0 RECURRENT UTI: ICD-10-CM

## 2024-03-15 DIAGNOSIS — R68.83 CHILLS (WITHOUT FEVER): ICD-10-CM

## 2024-03-15 DIAGNOSIS — N39.0 RECURRENT UTI: Primary | ICD-10-CM

## 2024-03-15 LAB
ALBUMIN SERPL BCP-MCNC: 4.4 G/DL (ref 3.2–4.9)
ALBUMIN/GLOB SERPL: 1.5 G/DL
ALP SERPL-CCNC: 74 U/L (ref 30–99)
ALT SERPL-CCNC: 16 U/L (ref 2–50)
ANION GAP SERPL CALC-SCNC: 15 MMOL/L (ref 7–16)
APPEARANCE UR: CLEAR
AST SERPL-CCNC: 19 U/L (ref 12–45)
BASOPHILS # BLD AUTO: 0 % (ref 0–1.8)
BASOPHILS # BLD: 0 K/UL (ref 0–0.12)
BILIRUB SERPL-MCNC: 0.8 MG/DL (ref 0.1–1.5)
BILIRUB UR STRIP-MCNC: NEGATIVE MG/DL
BUN SERPL-MCNC: 18 MG/DL (ref 8–22)
CALCIUM ALBUM COR SERPL-MCNC: 8.7 MG/DL (ref 8.5–10.5)
CALCIUM SERPL-MCNC: 9 MG/DL (ref 8.5–10.5)
CHLORIDE SERPL-SCNC: 99 MMOL/L (ref 96–112)
CO2 SERPL-SCNC: 22 MMOL/L (ref 20–33)
COLOR UR AUTO: NORMAL
CREAT SERPL-MCNC: 0.82 MG/DL (ref 0.5–1.4)
EOSINOPHIL # BLD AUTO: 0 K/UL (ref 0–0.51)
EOSINOPHIL NFR BLD: 0 % (ref 0–6.9)
ERYTHROCYTE [DISTWIDTH] IN BLOOD BY AUTOMATED COUNT: 43.1 FL (ref 35.9–50)
GFR SERPLBLD CREATININE-BSD FMLA CKD-EPI: 72 ML/MIN/1.73 M 2
GLOBULIN SER CALC-MCNC: 2.9 G/DL (ref 1.9–3.5)
GLUCOSE SERPL-MCNC: 167 MG/DL (ref 65–99)
GLUCOSE UR STRIP.AUTO-MCNC: NEGATIVE MG/DL
HCT VFR BLD AUTO: 35.9 % (ref 37–47)
HGB BLD-MCNC: 12.3 G/DL (ref 12–16)
KETONES UR STRIP.AUTO-MCNC: NEGATIVE MG/DL
LACTATE SERPL-SCNC: 1 MMOL/L (ref 0.5–2)
LEUKOCYTE ESTERASE UR QL STRIP.AUTO: NEGATIVE
LYMPHOCYTES # BLD AUTO: 1.08 K/UL (ref 1–4.8)
LYMPHOCYTES NFR BLD: 20 % (ref 22–41)
MANUAL DIFF BLD: NORMAL
MCH RBC QN AUTO: 30.1 PG (ref 27–33)
MCHC RBC AUTO-ENTMCNC: 34.3 G/DL (ref 32.2–35.5)
MCV RBC AUTO: 87.8 FL (ref 81.4–97.8)
MICROCYTES BLD QL SMEAR: ABNORMAL
MONOCYTES # BLD AUTO: 0.05 K/UL (ref 0–0.85)
MONOCYTES NFR BLD AUTO: 0.9 % (ref 0–13.4)
MORPHOLOGY BLD-IMP: NORMAL
NEUTROPHILS # BLD AUTO: 4.27 K/UL (ref 1.82–7.42)
NEUTROPHILS NFR BLD: 78.2 % (ref 44–72)
NEUTS BAND NFR BLD MANUAL: 0.9 % (ref 0–10)
NITRITE UR QL STRIP.AUTO: NEGATIVE
NRBC # BLD AUTO: 0 K/UL
NRBC BLD-RTO: 0 /100 WBC (ref 0–0.2)
PH UR STRIP.AUTO: 5.5 [PH] (ref 5–8)
PLATELET # BLD AUTO: 189 K/UL (ref 164–446)
PLATELET BLD QL SMEAR: NORMAL
PMV BLD AUTO: 9 FL (ref 9–12.9)
POTASSIUM SERPL-SCNC: 4.2 MMOL/L (ref 3.6–5.5)
PROT SERPL-MCNC: 7.3 G/DL (ref 6–8.2)
PROT UR QL STRIP: NEGATIVE MG/DL
RBC # BLD AUTO: 4.09 M/UL (ref 4.2–5.4)
RBC BLD AUTO: PRESENT
RBC UR QL AUTO: NEGATIVE
SODIUM SERPL-SCNC: 136 MMOL/L (ref 135–145)
SP GR UR STRIP.AUTO: 1.02
UROBILINOGEN UR STRIP-MCNC: 0.2 MG/DL
VARIANT LYMPHS BLD QL SMEAR: NORMAL
WBC # BLD AUTO: 5.4 K/UL (ref 4.8–10.8)

## 2024-03-15 PROCEDURE — 36415 COLL VENOUS BLD VENIPUNCTURE: CPT

## 2024-03-15 PROCEDURE — 87086 URINE CULTURE/COLONY COUNT: CPT | Mod: 91

## 2024-03-15 PROCEDURE — 87086 URINE CULTURE/COLONY COUNT: CPT

## 2024-03-15 PROCEDURE — 700102 HCHG RX REV CODE 250 W/ 637 OVERRIDE(OP): Performed by: EMERGENCY MEDICINE

## 2024-03-15 PROCEDURE — 80053 COMPREHEN METABOLIC PANEL: CPT

## 2024-03-15 PROCEDURE — 87077 CULTURE AEROBIC IDENTIFY: CPT

## 2024-03-15 PROCEDURE — 83605 ASSAY OF LACTIC ACID: CPT

## 2024-03-15 PROCEDURE — 81001 URINALYSIS AUTO W/SCOPE: CPT

## 2024-03-15 PROCEDURE — 81002 URINALYSIS NONAUTO W/O SCOPE: CPT | Performed by: PHYSICIAN ASSISTANT

## 2024-03-15 PROCEDURE — 99213 OFFICE O/P EST LOW 20 MIN: CPT | Performed by: PHYSICIAN ASSISTANT

## 2024-03-15 PROCEDURE — 3078F DIAST BP <80 MM HG: CPT | Performed by: PHYSICIAN ASSISTANT

## 2024-03-15 PROCEDURE — 71045 X-RAY EXAM CHEST 1 VIEW: CPT

## 2024-03-15 PROCEDURE — 85007 BL SMEAR W/DIFF WBC COUNT: CPT

## 2024-03-15 PROCEDURE — A9270 NON-COVERED ITEM OR SERVICE: HCPCS | Performed by: EMERGENCY MEDICINE

## 2024-03-15 PROCEDURE — 700105 HCHG RX REV CODE 258: Performed by: EMERGENCY MEDICINE

## 2024-03-15 PROCEDURE — 85027 COMPLETE CBC AUTOMATED: CPT

## 2024-03-15 PROCEDURE — 87040 BLOOD CULTURE FOR BACTERIA: CPT

## 2024-03-15 PROCEDURE — 99284 EMERGENCY DEPT VISIT MOD MDM: CPT

## 2024-03-15 PROCEDURE — 3075F SYST BP GE 130 - 139MM HG: CPT | Performed by: PHYSICIAN ASSISTANT

## 2024-03-15 RX ORDER — SODIUM CHLORIDE, SODIUM LACTATE, POTASSIUM CHLORIDE, CALCIUM CHLORIDE 600; 310; 30; 20 MG/100ML; MG/100ML; MG/100ML; MG/100ML
1000 INJECTION, SOLUTION INTRAVENOUS ONCE
Status: COMPLETED | OUTPATIENT
Start: 2024-03-15 | End: 2024-03-16

## 2024-03-15 RX ORDER — CEFDINIR 300 MG/1
300 CAPSULE ORAL 2 TIMES DAILY
Qty: 14 CAPSULE | Refills: 0 | Status: SHIPPED | OUTPATIENT
Start: 2024-03-15 | End: 2024-03-16

## 2024-03-15 RX ORDER — ACETAMINOPHEN 500 MG
1000 TABLET ORAL ONCE
Status: COMPLETED | OUTPATIENT
Start: 2024-03-15 | End: 2024-03-15

## 2024-03-15 RX ADMIN — SODIUM CHLORIDE, POTASSIUM CHLORIDE, SODIUM LACTATE AND CALCIUM CHLORIDE 1000 ML: 600; 310; 30; 20 INJECTION, SOLUTION INTRAVENOUS at 22:53

## 2024-03-15 RX ADMIN — ACETAMINOPHEN 1000 MG: 500 TABLET, FILM COATED ORAL at 23:28

## 2024-03-15 ASSESSMENT — FIBROSIS 4 INDEX
FIB4 SCORE: 2.62
FIB4 SCORE: 2.62

## 2024-03-15 ASSESSMENT — PAIN DESCRIPTION - PAIN TYPE: TYPE: ACUTE PAIN

## 2024-03-16 VITALS
WEIGHT: 169 LBS | BODY MASS INDEX: 28.16 KG/M2 | SYSTOLIC BLOOD PRESSURE: 183 MMHG | HEIGHT: 65 IN | TEMPERATURE: 98.3 F | OXYGEN SATURATION: 97 % | HEART RATE: 101 BPM | RESPIRATION RATE: 20 BRPM | DIASTOLIC BLOOD PRESSURE: 83 MMHG

## 2024-03-16 LAB
APPEARANCE UR: CLEAR
BACTERIA #/AREA URNS HPF: NEGATIVE /HPF
BILIRUB UR QL STRIP.AUTO: NEGATIVE
COLOR UR: YELLOW
EPI CELLS #/AREA URNS HPF: NEGATIVE /HPF
FLUAV RNA SPEC QL NAA+PROBE: NEGATIVE
FLUBV RNA SPEC QL NAA+PROBE: NEGATIVE
GLUCOSE UR STRIP.AUTO-MCNC: NEGATIVE MG/DL
HYALINE CASTS #/AREA URNS LPF: NORMAL /LPF
KETONES UR STRIP.AUTO-MCNC: 15 MG/DL
LEUKOCYTE ESTERASE UR QL STRIP.AUTO: NEGATIVE
MICRO URNS: ABNORMAL
NITRITE UR QL STRIP.AUTO: NEGATIVE
PH UR STRIP.AUTO: 6 [PH] (ref 5–8)
PROT UR QL STRIP: 30 MG/DL
RBC # URNS HPF: NORMAL /HPF
RBC UR QL AUTO: NEGATIVE
RSV RNA SPEC QL NAA+PROBE: NEGATIVE
SARS-COV-2 RNA RESP QL NAA+PROBE: NOTDETECTED
SP GR UR STRIP.AUTO: 1.01
UROBILINOGEN UR STRIP.AUTO-MCNC: 0.2 MG/DL
WBC #/AREA URNS HPF: NORMAL /HPF

## 2024-03-16 PROCEDURE — 0241U HCHG SARS-COV-2 COVID-19 NFCT DS RESP RNA 4 TRGT ED POC: CPT

## 2024-03-16 RX ORDER — CEPHALEXIN 500 MG/1
500 CAPSULE ORAL 4 TIMES DAILY
Qty: 28 CAPSULE | Refills: 0 | Status: ACTIVE | OUTPATIENT
Start: 2024-03-16 | End: 2024-03-23

## 2024-03-16 RX ORDER — ONDANSETRON 4 MG/1
2 TABLET, ORALLY DISINTEGRATING ORAL EVERY 6 HOURS PRN
Qty: 10 TABLET | Refills: 0 | Status: SHIPPED | OUTPATIENT
Start: 2024-03-16

## 2024-03-16 ASSESSMENT — PAIN DESCRIPTION - PAIN TYPE: TYPE: ACUTE PAIN

## 2024-03-16 NOTE — ED TRIAGE NOTES
"Chief Complaint   Patient presents with    N/V     Since 1600 hrs today     Headache     Since 1600 hrs today     Chills     Since 1600 hrs today    BIB EMS to triage with complaint of headache, nausea, vomiting and chills since 1600 hrs. Denies any change in bowel movement or cough. Endorses she was diagnosed with UTI at urgent care and started with first dose of antibiotics today.     Patient AAO X4, respirations even and unlabored on room air, afebrile at this time.    Medications given en route:  Tylenol 1000 mg at 1700 hrs   Zofran ODT 4 mg at 2050 hrs     BP (!) 174/91   Pulse 84   Temp 36.2 °C (97.1 °F) (Temporal)   Resp 18   Ht 1.646 m (5' 4.8\")   Wt 76.7 kg (169 lb)   SpO2 99%   BMI 28.30 kg/m²     Pt made aware of triage process, placed back into lobby, educated pt to tell staff of any worsening of symptoms     "

## 2024-03-16 NOTE — ED NOTES
"Pt discharged to home with steady gait.  Pt alert and oriented times 4 on room air.  IV discontinued and gauze placed, pt in possession of belongings.  Pt provided discharge education and information pertaining to medications and follow up appointments.  Pt received copy of discharge instructions and verbalized understanding. Encouraged to follow up with PCP. Cab called for pt. BP (!) 183/83   Pulse (!) 101   Temp 36.8 °C (98.3 °F) (Temporal)   Resp 20   Ht 1.646 m (5' 4.8\")   Wt 76.7 kg (169 lb)   SpO2 97%   BMI 28.30 kg/m²      "

## 2024-03-16 NOTE — ED NOTES
Pt medicated per MAR. Pt wheeled to and from restroom. Urine sample collected and sent. Covid swab collected and ran. Second set of cultures obtained. Pt denies needs at this time.

## 2024-03-16 NOTE — ED PROVIDER NOTES
ER Provider Note    Scribed for Dr. Thaddeus Garcia M.D. by Davon Bynum. 3/15/2024  10:23 PM    Primary Care Provider: Hamida Hooper M.D.    CHIEF COMPLAINT  Chief Complaint   Patient presents with    N/V     Since 1600 hrs today     Headache     Since 1600 hrs today     Chills     Since 1600 hrs today        EXTERNAL RECORDS REVIEWED  Outpatient Notes Patient seen earlier today at Urgent Care and was empirically treated with antibiotics for UTI.     HPI/ROS    Kristin Barrios is a 78 y.o. female who presents to the ED via EMS for evaluation of nausea and vomiting onset 4 PM today. The patient states she was seen at Urgent Care earlier today and was diagnosed with a UTI and started on antibiotics. The patient reports since she got home from Urgent Care, she developed severe nausea and vomiting. The patient also reports headache, chills, and dysuria onset 4 PM. Patient denies shortness of breath, cough, or abdominal pain. The patient reports a medical history of hypertension, and notes she currently takes two blood pressure medications.       PAST MEDICAL HISTORY  Past Medical History:   Diagnosis Date    Allergy     Ankle fracture 2012    bilateral, no surgery required    Blood clotting disorder (HCC)     DVT age 25-30? Was on Warfarin, but had to get blood transfusions    Cancer (HCC)     basal cell, skin    Cataract     Colitis 2020    Dental disorder     implant left upper    Diabetes (HCC)     oral meds    Disorder of thyroid     GERD (gastroesophageal reflux disease)     Hemorrhagic disorder (HCC)     only on Warfarin, needed blood transfusions    Hiatus hernia syndrome     High cholesterol     Hypertension        SURGICAL HISTORY  Past Surgical History:   Procedure Laterality Date    CATARACT EXTRACTION WITH IOL Bilateral 08/2023    TURBT (TRANSURETHRAL RESECTION OF BLADDER TUMOR)  05/28/2021    Procedure: TURBT (TRANSURETHRAL RESECTION OF BLADDER TUMOR) - BIPOLAR.;  Surgeon: Dave Arteaga M.D.;   Location: SURGERY [facility];  Service: Urology    ABDOMINAL HYSTERECTOMY TOTAL  1970?    CHOLECYSTECTOMY  1995?    LUMPECTOMY Right     benign    OTHER      colonoscopy X2       FAMILY HISTORY  Family History   Problem Relation Age of Onset    Cancer Father         Cancer    Cancer Sister         renal    Cancer Brother         renal    Cancer Sister         Renal Carcinoma    Diabetes Neg Hx     Ovarian Cancer Neg Hx     Tubal Cancer Neg Hx     Peritoneal Cancer Neg Hx     Colorectal Cancer Neg Hx     Breast Cancer Neg Hx        SOCIAL HISTORY   reports that she has never smoked. She has never used smokeless tobacco. She reports that she does not currently use alcohol. She reports that she does not use drugs.    CURRENT MEDICATIONS  Previous Medications    ATORVASTATIN (LIPITOR) 40 MG TAB    TAKE 1 TABLET BY MOUTH  DAILY    CARVEDILOL (COREG) 12.5 MG TAB    TAKE 1 TABLET BY MOUTH TWICE  DAILY WITH MEALS    CEFDINIR (OMNICEF) 300 MG CAP    Take 1 Capsule by mouth 2 times a day for 7 days.    CIPROFLOXACIN (CILOXIN) 0.3 % SOLUTION    1 drop of solution on toe nail three times a day for 2 weeks.    CITALOPRAM (CELEXA) 10 MG TABLET    Take 1 Tablet by mouth every day.    FERROUS SULFATE (IRON PO)    Take 1 tablet by mouth every day. OTC    LEVOTHYROXINE (SYNTHROID) 75 MCG TAB    TAKE 1 TABLET BY MOUTH IN THE  MORNING ON AN EMPTY STOMACH    METFORMIN ER (GLUCOPHAGE XR) 500 MG TABLET SR 24 HR    TAKE 2 TABLETS BY MOUTH IN  THE MORNING AND 1 TABLET BY MOUTH IN THE EVENING    OMEPRAZOLE (PRILOSEC) 20 MG DELAYED-RELEASE CAPSULE    TAKE 1 CAPSULE BY MOUTH  TWICE DAILY    PRAZOSIN (MINIPRESS) 1 MG CAP    TAKE 1 CAPSULE BY MOUTH 3 TIMES  DAILY AS NEEDED FOR BLOOD  PRESSURE SPIKES, IF SBP&gt;160 OR  DBP &gt;100 FOR UP TO 90 DAYS    TELMISARTAN (MICARDIS) 80 MG TAB    TAKE 1 TABLET BY MOUTH AT  BEDTIME       ALLERGIES  Lisinopril, Warfarin, and Oxycodone    PHYSICAL EXAM  BP (!) 163/73   Pulse 97   Temp 36.2 °C (97.1 °F)  "(Temporal)   Resp 17   Ht 1.646 m (5' 4.8\")   Wt 76.7 kg (169 lb)   SpO2 97%   BMI 28.30 kg/m²   Constitutional: Alert in no apparent distress. Mildly tired appearing.   HENT: No signs of trauma, Bilateral external ears normal, Nose normal.   Eyes: Pupils are equal and reactive, Conjunctiva normal, Non-icteric.   Neck: Normal range of motion, No tenderness, Supple,   Cardiovascular: Regular rate and rhythm, no murmurs.   Thorax & Lungs: Normal breath sounds, No respiratory distress, No wheezing, No chest tenderness.   Abdomen: Bowel sounds normal, Soft, No tenderness, No masses, No pulsatile masses. No peritoneal signs.  Skin: Warm, Dry, No erythema, No rash.   Back: No bony tenderness, No CVA tenderness.   Extremities: No edema, No tenderness, No cyanosis, no tenderness  Psychiatric: Affect normal       DIAGNOSTIC STUDIES & PROCEDURES    Labs:   Labs Reviewed   CBC WITH DIFFERENTIAL - Abnormal; Notable for the following components:       Result Value    RBC 4.09 (*)     Hematocrit 35.9 (*)     Neutrophils-Polys 78.20 (*)     Lymphocytes 20.00 (*)     All other components within normal limits   COMP METABOLIC PANEL - Abnormal; Notable for the following components:    Glucose 167 (*)     All other components within normal limits   URINALYSIS - Abnormal; Notable for the following components:    Ketones 15 (*)     Protein 30 (*)     All other components within normal limits    Narrative:     Indication for culture:->Emergency Room Patient   LACTIC ACID   ESTIMATED GFR   PLATELET ESTIMATE   MORPHOLOGY   PERIPHERAL SMEAR REVIEW   DIFFERENTIAL MANUAL   URINE MICROSCOPIC (W/UA)    Narrative:     Indication for culture:->Emergency Room Patient   URINE CULTURE(NEW)    Narrative:     Indication for culture:->Emergency Room Patient   BLOOD CULTURE    Narrative:     Blood Cultures X2. Draw one blood culture from central line  and one blood culture peripherally. If no line present, draw  blood cultures times two " peripherally from different sites.   BLOOD CULTURE   POCT COV-2, FLU A/B, RSV BY PCR       All labs reviewed by me.      Radiology:   The attending Emergency Physician has independently interpreted the diagnostic imaging associated with this visit and is awaiting the final reading from the radiologist, which will be displayed below.    Preliminary interpretation is a follows: No obvious pneumonia.  Radiologist interpretation:      DX-CHEST-PORTABLE (1 VIEW)   Final Result         1.  Left basilar atelectasis, no focal infiltrate   2.  Atherosclerosis           COURSE & MEDICAL DECISION MAKING    ED Observation Status? No; Patient does not meet criteria for ED Observation.     INITIAL ASSESSMENT AND PLAN  Care Narrative:        10:23 PM - Patient seen and evaluated at bedside. Discussed plan of care, including lab work, diagnostic imaging, and fluids. Patient agrees to plan of care. Patient will be treated with LR infusion for her symptoms. Ordered POCT CoV-2, Flu A/B, and RSV by PCR, blood culture x2, UA, CMP, CBC w/ diff, lactic acid, and DX-Chest.    to evaluate.    12:17 AM - Patient was reevaluated at bedside. Discussed lab and radiology results with the patient and informed them of my plan to discharge. I informed the patient I am prescribing her Zofran to take as needed for nausea and/or vomiting. I instructed the patient to follow up with her PCP in 2 days, and to return to [facility] for any new or worsening symptoms. Patient was given the opportunity for questions. I addressed all questions or concerns at this time and they verbalize agreement to the plan of care.       HYDRATION: Based on the patient's presentation of Acute Vomiting the patient was given IV fluids. IV Hydration was used because oral hydration was not adequate alone. Upon recheck following hydration, the patient was improved.    ADDITIONAL PROBLEM LIST AND DISPOSITION   Patient with a benign abdominal exam.  Ultimately the patient does have  a history of clean urine as a do grow bacteria.  I believe today may have been a reaction to cefdinir with nausea and vomiting associated with this.  The patient does have some ketones in the urine but no obvious glucose.  There is no bacteria noted in the urine.  Lactic acid is normal.  There is no leukocytosis.  Feeling improved after fluids.  I will have the patient increase the frequency of Keflex.  Will not have the patient take the Omnicef any longer.  Will discharge home with strict return precautions and follow-up.               DISPOSITION AND DISCUSSIONS  I have discussed management of the patient with the following physicians and MJ's: None    Discussion of management with other [facility] or appropriate source(s): None     Escalation of care considered, and ultimately not performed: acute inpatient care management, however at this time, the patient is most appropriate for outpatient management.    Barriers to care at this time, including but not limited to:  None .     Decision tools and prescription drugs considered including, but not limited to:  Antinausea: Zofran .    The patient will return for new or worsening symptoms and is stable at the time of discharge.    The patient is referred to a primary physician for blood pressure management, diabetic screening, and for all other preventative health concerns.      DISPOSITION:  Patient will be discharged home in stable condition.    FOLLOW UP:  Hamida Hooper M.D.  [address]  [facility] [id number] [phone]    In 2 days        OUTPATIENT MEDICATIONS:  New Prescriptions    ONDANSETRON (ZOFRAN ODT) 4 MG TABLET DISPERSIBLE    Take 0.5 Tablets by mouth every 6 hours as needed for Nausea/Vomiting.       FINAL IMPRESSION   1. Nausea and vomiting, unspecified vomiting type    2. Myalgia    3. Nonintractable headache, unspecified chronicity pattern, unspecified headache type        I, Davon Bynum (Scribelena), macho scribing for, and in the presence of, Thaddeus HAMILTNO  SARA Garcia.    Electronically signed by: Davon Bynum (Scribe), 3/15/2024    IThaddeus M.D. personally performed the services described in this documentation, as scribed by Davon Bynum in my presence, and it is both accurate and complete.    The note accurately reflects work and decisions made by me.  Thaddeus Garcia M.D.  3/16/2024  2:48 AM

## 2024-03-16 NOTE — ED NOTES
PIV inserted, labs drawn and sent. Infusion started. Pt reports headache, requesting pain meds.  ERP notified.

## 2024-03-18 ENCOUNTER — OFFICE VISIT (OUTPATIENT)
Dept: MEDICAL GROUP | Facility: PHYSICIAN GROUP | Age: 79
End: 2024-03-18
Payer: MEDICARE

## 2024-03-18 ENCOUNTER — HOSPITAL ENCOUNTER (OUTPATIENT)
Facility: MEDICAL CENTER | Age: 79
End: 2024-03-18
Attending: FAMILY MEDICINE
Payer: MEDICARE

## 2024-03-18 VITALS
DIASTOLIC BLOOD PRESSURE: 78 MMHG | SYSTOLIC BLOOD PRESSURE: 134 MMHG | TEMPERATURE: 97.4 F | BODY MASS INDEX: 29.37 KG/M2 | OXYGEN SATURATION: 98 % | WEIGHT: 172 LBS | HEIGHT: 64 IN | HEART RATE: 78 BPM

## 2024-03-18 DIAGNOSIS — N39.0 RECURRENT UTI: Primary | ICD-10-CM

## 2024-03-18 DIAGNOSIS — N39.0 RECURRENT UTI: ICD-10-CM

## 2024-03-18 DIAGNOSIS — Z00.00 HEALTH CARE MAINTENANCE: ICD-10-CM

## 2024-03-18 DIAGNOSIS — R11.2 NAUSEA AND VOMITING, UNSPECIFIED VOMITING TYPE: ICD-10-CM

## 2024-03-18 LAB
BACTERIA UR CULT: NORMAL
BACTERIA UR CULT: NORMAL
SIGNIFICANT IND 70042: NORMAL
SIGNIFICANT IND 70042: NORMAL
SITE SITE: NORMAL
SITE SITE: NORMAL
SOURCE SOURCE: NORMAL
SOURCE SOURCE: NORMAL

## 2024-03-18 PROCEDURE — 3078F DIAST BP <80 MM HG: CPT | Performed by: FAMILY MEDICINE

## 2024-03-18 PROCEDURE — 3075F SYST BP GE 130 - 139MM HG: CPT | Performed by: FAMILY MEDICINE

## 2024-03-18 PROCEDURE — 99214 OFFICE O/P EST MOD 30 MIN: CPT | Performed by: FAMILY MEDICINE

## 2024-03-18 PROCEDURE — 87077 CULTURE AEROBIC IDENTIFY: CPT

## 2024-03-18 PROCEDURE — 87086 URINE CULTURE/COLONY COUNT: CPT

## 2024-03-18 RX ORDER — CEFDINIR 300 MG/1
CAPSULE ORAL
COMMUNITY
End: 2024-03-18 | Stop reason: SINTOL

## 2024-03-18 ASSESSMENT — FIBROSIS 4 INDEX: FIB4 SCORE: 2.01

## 2024-03-18 NOTE — LETTER
Critical access hospital  Hamida Hooper M.D.  [address]  [place] [address]  Fax: [phone]   Authorization for Release/Disclosure of   Protected Health Information   Name: YUMIKO BARRIOS : 1945 SSN: [id number]   Address: [address] [place] [address]  [place] [address] Phone:    [phone] (home)    I authorize the entity listed below to release/disclose the PHI below to:   Critical access hospital/Hamida Hooper M.D. and Hamida Hooper M.D.   Provider or Entity Name:  [facility]   Address   City, State, [facility]   Phone:      Fax:     Reason for request: continuity of care   Information to be released:    [  ] LAST COLONOSCOPY,  including any PATH REPORT and follow-up  [  ] LAST FIT/COLOGUARD RESULT [  ] LAST DEXA  [  ] LAST MAMMOGRAM  [  ] LAST PAP  [  ] LAST LABS [XX] RETINA EXAM REPORT  [  ] IMMUNIZATION RECORDS  [  ] Release all info      [  ] Check here and initial the line next to each item to release ALL health information INCLUDING  _____ Care and treatment for drug and / or alcohol abuse  _____ HIV testing, infection status, or AIDS  _____ Genetic Testing    DATES OF SERVICE OR TIME PERIOD TO BE DISCLOSED: _____________  I understand and acknowledge that:  * This Authorization may be revoked at any time by you in writing, except if your health information has already been used or disclosed.  * Your health information that will be used or disclosed as a result of you signing this authorization could be re-disclosed by the recipient. If this occurs, your re-disclosed health information may no longer be protected by State or Federal laws.  * You may refuse to sign this Authorization. Your refusal will not affect your ability to obtain treatment.  * This Authorization becomes effective upon signing and will  on (date) __________.      If no date is indicated, this Authorization will  one (1) year from the signature date.    Name: Yumiko Barrios  Signature: Date:   3/18/2024     PLEASE FAX  REQUESTED RECORDS BACK TO: [phone]

## 2024-03-18 NOTE — PROGRESS NOTES
"Verbal consent was acquired by the patient to use [employer] ambient listening note generation during this visit     Subjective:     HPI:   History of Present Illness  The patient presents for evaluation of multiple medical concerns.    She was seen in the ER 3 days ago on Friday for nausea and vomiting. She thought she had a urinary tract infection and went to urgent care on the same day. She was given a prescription for cefdinir, which she took and had a bad reaction to it. She had a 10 to 15 headache, burning up inside, vomiting, and disorientation. Her breathing was alright and she was able to move around. Because of the severity of it, she spoke to a nurse who advised her to go to the ER. She asked if she could stop taking the pill and wait it out to get out of her system, but she could not, so she went to the ER. Her symptoms started about 4 to 6 hours after taking the pill. She made an appointment with a urologist in [place] and was advised to take Keflex 250 mg a day. She never got the prescription. She thinks she got another infection again. She started getting symptoms on Sunday. She has burning and discomfort when she urinates. She has increased urgency to urinate. She denies any blood in her urine. She denies any vaginal discharge. She denies any fevers or chills. She denies any pain in the flank. She denies any nausea or vomiting.    Supplemental Information  She had RSV and influenza.   She is allergic to CEFDINIR.   She has received her COVID-19 booster.    Health Maintenance: Completed    Objective:     Exam:  /78 (BP Location: Left arm, Patient Position: Sitting, BP Cuff Size: Adult long)   Pulse 78   Temp 36.3 °C (97.4 °F) (Temporal)   Ht 1.626 m (5' 4\")   Wt 78 kg (172 lb)   SpO2 98%   BMI 29.52 kg/m²  Body mass index is 29.52 kg/m².    Physical Exam  Constitutional:       Appearance: Normal appearance.   Cardiovascular:      Rate and Rhythm: Normal rate and regular rhythm.      Heart " sounds: Normal heart sounds.   Pulmonary:      Effort: Pulmonary effort is normal.      Breath sounds: Normal breath sounds.   Abdominal:      General: Abdomen is protuberant. Bowel sounds are normal.      Palpations: Abdomen is soft.      Tenderness: There is no abdominal tenderness. There is no right CVA tenderness or left CVA tenderness.   Musculoskeletal:      Cervical back: Normal range of motion and neck supple.   Lymphadenopathy:      Cervical: No cervical adenopathy.   Neurological:      Mental Status: She is alert.             Results  Laboratory Studies  Labs were reviewed with the patient.    Assessment & Plan:     1. Recurrent UTI  URINE CULTURE(NEW)      2. Nausea and vomiting, unspecified vomiting type            Assessment & Plan  1. Recurrent UTI.  This is chronic and acutely exacerbated. She was at urgent care and the ER 3 days ago and the urine results at both of those visits showed no urinary tract infection, but she started getting symptoms yesterday. Since she is already on Keflex, we will just check a urine culture to be sure the Keflex is the appropriate antibiotic. For now, she will continue Keflex 500 mg 4 times daily.    2. Nausea and vomiting.  This is acute and resolved. She was seen in the ER 3 days ago because she developed severe nausea and vomiting following the first dose of cefdinir and likely was a severe side effect as once she stopped that medication, there has been no return of nausea or vomiting. I have added the cefdinir to the allergy list.    3. Healthcare maintenance.  I will track down her recent COVID-19 and RSV vaccine doses. She is going to sign a record release today to get her most recent retinal exam.    Follow-up  The patient will follow up at her scheduled appointment in 04/2024.      Please note that this dictation was created using voice recognition software. I have made every reasonable attempt to correct obvious errors, but I expect that there are errors of  grammar and possibly content that I did not discover before finalizing the note.

## 2024-03-19 ASSESSMENT — ENCOUNTER SYMPTOMS
NAUSEA: 0
FEVER: 0
VOMITING: 0
FLANK PAIN: 0
CHILLS: 1

## 2024-03-19 NOTE — PROGRESS NOTES
"Subjective     Rebecca Barrios is a 78 y.o. female who presents with UTI (UTI , reoccurring , wasn't able to take course of antibiotics and now having new symptoms )            Patient presents with:  UTI symptoms, pt has history of recurrent UTI , wasn't able to take course of antibiotics and now having new symptoms.  Patient has had negative UA with positive urine culture results.  Patient is concerned that she has a UTI since she was not able to take the course of antibiotics for her last UTI.  Patient denies fever, but admits to chills and overall feeling poorly.  Patient denies any other complaint.      UTI  This is a new problem. The current episode started in the past 7 days. The problem occurs constantly. The problem has been gradually worsening. Associated symptoms include chills and urinary symptoms. Pertinent negatives include no fever, nausea or vomiting. Nothing aggravates the symptoms. She has tried drinking for the symptoms. The treatment provided no relief.       Review of Systems   Constitutional:  Positive for chills. Negative for fever.   Gastrointestinal:  Negative for nausea and vomiting.   Genitourinary:  Positive for dysuria, frequency and urgency. Negative for flank pain and hematuria.   All other systems reviewed and are negative.             Objective     /78 (BP Location: Left arm, Patient Position: Sitting, BP Cuff Size: Adult)   Pulse 80   Temp 37 °C (98.6 °F) (Temporal)   Resp 14   Ht 1.626 m (5' 4\")   Wt 76.7 kg (169 lb)   SpO2 98%   BMI 29.01 kg/m²      Physical Exam  Vitals and nursing note reviewed.   Constitutional:       General: She is not in acute distress.     Appearance: Normal appearance. She is well-developed and normal weight. She is not ill-appearing or toxic-appearing.   HENT:      Head: Normocephalic and atraumatic.      Right Ear: Tympanic membrane normal.      Left Ear: Tympanic membrane normal.      Nose: Nose normal.      Mouth/Throat:      Lips: " Pink.      Mouth: Mucous membranes are moist.      Pharynx: Oropharynx is clear. Uvula midline.   Eyes:      Extraocular Movements: Extraocular movements intact.      Conjunctiva/sclera: Conjunctivae normal.      Pupils: Pupils are equal, round, and reactive to light.   Cardiovascular:      Rate and Rhythm: Normal rate and regular rhythm.      Pulses: Normal pulses.      Heart sounds: Normal heart sounds.   Pulmonary:      Effort: Pulmonary effort is normal.      Breath sounds: Normal breath sounds.   Abdominal:      General: Bowel sounds are normal.      Palpations: Abdomen is soft.   Musculoskeletal:         General: Normal range of motion.      Cervical back: Normal range of motion and neck supple.   Skin:     General: Skin is warm and dry.      Capillary Refill: Capillary refill takes less than 2 seconds.   Neurological:      General: No focal deficit present.      Mental Status: She is alert and oriented to person, place, and time.      Cranial Nerves: No cranial nerve deficit.      Motor: Motor function is intact.      Coordination: Coordination is intact.      Gait: Gait normal.   Psychiatric:         Mood and Affect: Mood normal.                             Assessment & Plan                1. Recurrent UTI  POCT Urinalysis    URINE CULTURE(NEW)    DISCONTINUED: cefdinir (OMNICEF) 300 MG Cap      2. Dysuria  POCT Urinalysis    URINE CULTURE(NEW)    DISCONTINUED: cefdinir (OMNICEF) 300 MG Cap      3. Chills (without fever)  POCT Urinalysis    URINE CULTURE(NEW)    DISCONTINUED: cefdinir (OMNICEF) 300 MG Cap        Patient presents to clinic today with concern for recurrent UTI.  Patient has history of UTI, patient was seen 2 weeks ago for urinary symptoms, urine culture grew out positive for bacteria so patient was treated with antibiotics.  Patient states she was unable to  those medications so she never treated the UTI.    Based on urine culture results, I will send prescription for cefdinir twice  daily x 7 days and reculture this urine.  Patient verbalized understanding and agreement with this plan.    Differential diagnosis, supportive care, and indications for immediate follow-up discussed with patient.  Instructed to return to clinic or nearest emergency department for any change in condition, further concerns, or worsening of symptoms.    I personally reviewed prior external notes and test results pertinent to today's visit.  I have independently reviewed and interpreted all diagnostics ordered during this urgent care visit.    PT should follow up with PCP in 1-2 days for re-evaluation if symptoms have not improved.      Discussed red flags and reasons to return to UC or ED.      Pt and/or family verbalized understanding of diagnosis and follow up instructions and was offered informational handout on diagnosis.  PT discharged.     Please note that this dictation was created using voice recognition software. I have made every reasonable attempt to correct obvious errors, but I expect that there may be errors of grammar and possibly content that I did not discover before finalizing the note.

## 2024-03-21 LAB
BACTERIA BLD CULT: NORMAL
BACTERIA BLD CULT: NORMAL
BACTERIA UR CULT: NORMAL
SIGNIFICANT IND 70042: NORMAL
SITE SITE: NORMAL
SOURCE SOURCE: NORMAL

## 2024-03-22 DIAGNOSIS — I10 ESSENTIAL HYPERTENSION: ICD-10-CM

## 2024-03-26 RX ORDER — TELMISARTAN 80 MG/1
80 TABLET ORAL
Qty: 90 TABLET | Refills: 3 | Status: SHIPPED | OUTPATIENT
Start: 2024-03-26

## 2024-03-26 RX ORDER — CARVEDILOL 12.5 MG/1
12.5 TABLET ORAL 2 TIMES DAILY WITH MEALS
Qty: 180 TABLET | Refills: 3 | Status: SHIPPED | OUTPATIENT
Start: 2024-03-26

## 2024-04-03 ENCOUNTER — TELEPHONE (OUTPATIENT)
Dept: MEDICAL GROUP | Facility: PHYSICIAN GROUP | Age: 79
End: 2024-04-03
Payer: MEDICARE

## 2024-04-03 NOTE — TELEPHONE ENCOUNTER
"VOICEMAIL  1. Caller Name: Kristin Barrios                        Call Back Number: [phone] (home)       2. Message: PT thinks she is having a bad reaction to citalopram (CELEXA) 10 MG tablet  stating she is \"Twitchy\" would like to know if she should continue the medication.     3. Patient approves office to leave a detailed voicemail/MyChart message: N\A  " no

## 2024-04-05 ENCOUNTER — HOSPITAL ENCOUNTER (OUTPATIENT)
Dept: LAB | Facility: MEDICAL CENTER | Age: 79
End: 2024-04-05
Attending: FAMILY MEDICINE
Payer: MEDICARE

## 2024-04-05 ENCOUNTER — HOSPITAL ENCOUNTER (OUTPATIENT)
Dept: LAB | Facility: MEDICAL CENTER | Age: 79
End: 2024-04-05
Attending: NURSE PRACTITIONER
Payer: MEDICARE

## 2024-04-05 DIAGNOSIS — Z11.59 NEED FOR HEPATITIS C SCREENING TEST: ICD-10-CM

## 2024-04-05 DIAGNOSIS — R80.9 TYPE 2 DIABETES MELLITUS WITH MICROALBUMINURIA, WITHOUT LONG-TERM CURRENT USE OF INSULIN (HCC): ICD-10-CM

## 2024-04-05 DIAGNOSIS — E03.4 HYPOTHYROIDISM DUE TO ACQUIRED ATROPHY OF THYROID: ICD-10-CM

## 2024-04-05 DIAGNOSIS — E11.29 TYPE 2 DIABETES MELLITUS WITH MICROALBUMINURIA, WITHOUT LONG-TERM CURRENT USE OF INSULIN (HCC): ICD-10-CM

## 2024-04-05 DIAGNOSIS — D64.9 ANEMIA, UNSPECIFIED TYPE: ICD-10-CM

## 2024-04-05 DIAGNOSIS — I10 ESSENTIAL HYPERTENSION: ICD-10-CM

## 2024-04-05 LAB
ERYTHROCYTE [DISTWIDTH] IN BLOOD BY AUTOMATED COUNT: 43.9 FL (ref 35.9–50)
HCT VFR BLD AUTO: 36.8 % (ref 37–47)
HGB BLD-MCNC: 12.3 G/DL (ref 12–16)
MCH RBC QN AUTO: 29.7 PG (ref 27–33)
MCHC RBC AUTO-ENTMCNC: 33.4 G/DL (ref 32.2–35.5)
MCV RBC AUTO: 88.9 FL (ref 81.4–97.8)
PLATELET # BLD AUTO: 195 K/UL (ref 164–446)
PMV BLD AUTO: 9.4 FL (ref 9–12.9)
RBC # BLD AUTO: 4.14 M/UL (ref 4.2–5.4)
WBC # BLD AUTO: 2.9 K/UL (ref 4.8–10.8)

## 2024-04-05 PROCEDURE — 36415 COLL VENOUS BLD VENIPUNCTURE: CPT

## 2024-04-05 PROCEDURE — 84443 ASSAY THYROID STIM HORMONE: CPT

## 2024-04-05 PROCEDURE — 80061 LIPID PANEL: CPT

## 2024-04-05 PROCEDURE — 83550 IRON BINDING TEST: CPT

## 2024-04-05 PROCEDURE — 85027 COMPLETE CBC AUTOMATED: CPT

## 2024-04-05 PROCEDURE — 83540 ASSAY OF IRON: CPT

## 2024-04-05 PROCEDURE — G0472 HEP C SCREEN HIGH RISK/OTHER: HCPCS | Mod: GA

## 2024-04-05 PROCEDURE — 82728 ASSAY OF FERRITIN: CPT

## 2024-04-06 LAB
CHOLEST SERPL-MCNC: 137 MG/DL (ref 100–199)
FASTING STATUS PATIENT QL REPORTED: NORMAL
FERRITIN SERPL-MCNC: 80.8 NG/ML (ref 10–291)
HCV AB SER QL: NORMAL
HDLC SERPL-MCNC: 38 MG/DL
IRON SATN MFR SERPL: 33 % (ref 15–55)
IRON SERPL-MCNC: 97 UG/DL (ref 40–170)
LDLC SERPL CALC-MCNC: 70 MG/DL
TIBC SERPL-MCNC: 297 UG/DL (ref 250–450)
TRIGL SERPL-MCNC: 143 MG/DL (ref 0–149)
TSH SERPL DL<=0.005 MIU/L-ACNC: 0.62 UIU/ML (ref 0.38–5.33)
UIBC SERPL-MCNC: 200 UG/DL (ref 110–370)

## 2024-04-10 ENCOUNTER — APPOINTMENT (OUTPATIENT)
Dept: ADMISSIONS | Facility: MEDICAL CENTER | Age: 79
End: 2024-04-10
Attending: OBSTETRICS & GYNECOLOGY
Payer: MEDICARE

## 2024-04-12 ENCOUNTER — OFFICE VISIT (OUTPATIENT)
Dept: MEDICAL GROUP | Facility: PHYSICIAN GROUP | Age: 79
End: 2024-04-12
Payer: MEDICARE

## 2024-04-12 VITALS
SYSTOLIC BLOOD PRESSURE: 134 MMHG | DIASTOLIC BLOOD PRESSURE: 72 MMHG | HEART RATE: 78 BPM | TEMPERATURE: 97.2 F | HEIGHT: 64 IN | WEIGHT: 170.4 LBS | BODY MASS INDEX: 29.09 KG/M2 | OXYGEN SATURATION: 97 %

## 2024-04-12 DIAGNOSIS — D64.9 ANEMIA, UNSPECIFIED TYPE: ICD-10-CM

## 2024-04-12 DIAGNOSIS — R80.9 TYPE 2 DIABETES MELLITUS WITH DIABETIC MICROALBUMINURIA, WITHOUT LONG-TERM CURRENT USE OF INSULIN (HCC): Primary | ICD-10-CM

## 2024-04-12 DIAGNOSIS — E11.29 TYPE 2 DIABETES MELLITUS WITH DIABETIC MICROALBUMINURIA, WITHOUT LONG-TERM CURRENT USE OF INSULIN (HCC): Primary | ICD-10-CM

## 2024-04-12 DIAGNOSIS — M25.512 CHRONIC LEFT SHOULDER PAIN: ICD-10-CM

## 2024-04-12 DIAGNOSIS — G89.29 CHRONIC LEFT SHOULDER PAIN: ICD-10-CM

## 2024-04-12 DIAGNOSIS — Z00.00 HEALTH CARE MAINTENANCE: ICD-10-CM

## 2024-04-12 DIAGNOSIS — F41.9 ANXIETY: ICD-10-CM

## 2024-04-12 LAB
HBA1C MFR BLD: 6.6 % (ref ?–5.8)
POCT INT CON NEG: NEGATIVE
POCT INT CON POS: POSITIVE

## 2024-04-12 PROCEDURE — 3075F SYST BP GE 130 - 139MM HG: CPT | Performed by: FAMILY MEDICINE

## 2024-04-12 PROCEDURE — 83036 HEMOGLOBIN GLYCOSYLATED A1C: CPT | Performed by: FAMILY MEDICINE

## 2024-04-12 PROCEDURE — 3078F DIAST BP <80 MM HG: CPT | Performed by: FAMILY MEDICINE

## 2024-04-12 PROCEDURE — 99214 OFFICE O/P EST MOD 30 MIN: CPT | Performed by: FAMILY MEDICINE

## 2024-04-12 ASSESSMENT — ANXIETY QUESTIONNAIRES
GAD7 TOTAL SCORE: 5
2. NOT BEING ABLE TO STOP OR CONTROL WORRYING: SEVERAL DAYS
7. FEELING AFRAID AS IF SOMETHING AWFUL MIGHT HAPPEN: NOT AT ALL
4. TROUBLE RELAXING: SEVERAL DAYS
6. BECOMING EASILY ANNOYED OR IRRITABLE: SEVERAL DAYS
3. WORRYING TOO MUCH ABOUT DIFFERENT THINGS: SEVERAL DAYS
1. FEELING NERVOUS, ANXIOUS, OR ON EDGE: SEVERAL DAYS
5. BEING SO RESTLESS THAT IT IS HARD TO SIT STILL: NOT AT ALL

## 2024-04-12 ASSESSMENT — FIBROSIS 4 INDEX: FIB4 SCORE: 1.948717948717948718

## 2024-04-12 NOTE — PROGRESS NOTES
"Verbal consent was acquired by the patient to use [employer] ambient listening note generation during this visit     Subjective:     HPI:   History of Present Illness  The patient presents for evaluation of multiple medical concerns. She is accompanied by an adult male.    The patient is currently on a regimen of metformin 500 mg, administered as one tablet in the morning and one in the evening.    The patient continues to take citalopram for her anxiety, which was initiated in 02/2024. She reports a 50 percent improvement in her anxiety symptoms. Although she continues to experience occasional dizzy episodes and tremors, these have shown improvement. She has been managing various health issues well, albeit anxious about the upcoming surgery. She denies experiencing chest pain, respiratory distress, fevers, or chills.    The patient expresses concern about her iron levels. She has a history of heavy menstrual cycles, which led to a hysterectomy, two blood transfusions, a miscarriage, and endometriosis.    The patient reports difficulty raising her hand due to pain, which she suspects may be due to arthritis or bursitis. This issue has been ongoing for 2 to 3 months, possibly longer. The pain is located at the top of the shoulder and is exacerbated by certain movements such as sitting or sleeping in a chair. She denies any specific injury to the shoulder. She denies any numbness or tingling radiating down the arm. She denies any loss of range of motion in the elbow. She underwent nerve studies for carpal tunnel syndrome, which did not reveal any abnormalities.   Her mother was anemic.    Health Maintenance: Completed    Objective:     Exam:  /72 (BP Location: Right arm, Patient Position: Sitting, BP Cuff Size: Adult)   Pulse 78   Temp 36.2 °C (97.2 °F) (Temporal)   Ht 1.626 m (5' 4\")   Wt 77.3 kg (170 lb 6.4 oz)   SpO2 97%   BMI 29.25 kg/m²  Body mass index is 29.25 kg/m².    Physical " Exam  Constitutional:       Appearance: Normal appearance.   Cardiovascular:      Rate and Rhythm: Normal rate and regular rhythm.      Heart sounds: Normal heart sounds.   Pulmonary:      Effort: Pulmonary effort is normal.      Breath sounds: Normal breath sounds.   Musculoskeletal:      Cervical back: Normal range of motion and neck supple.      Comments: L Shoulder: Full ROM, TTP over AC joint, decreased strength but equal bilaterally, empty can test negative, drop arm test negative, [place] negative   Lymphadenopathy:      Cervical: No cervical adenopathy.   Neurological:      Mental Status: She is alert.             Results  Laboratory Studies  A1c is 6.6%. Iron levels are normal. Red blood cell count is slightly low. Hemoglobin level is 12.3. Hematocrit is slightly low.    Testing  AURELIANO-7 score is 5.    Assessment & Plan:     1. Type 2 diabetes mellitus with diabetic microalbuminuria, without long-term current use of insulin ([facility])  POCT Hemoglobin A1C      2. Anxiety        3. Anemia, unspecified type  VITAMIN B12    FOLATE    LDH    HAPTOGLOBIN    BILIRUBIN INDIRECT    BILIRUBIN DIRECT    RETICULOCYTES COUNT      4. Chronic left shoulder pain        5. Health care maintenance            Assessment & Plan  1. Type 2 diabetes, complicated by microalbuminuria.  The patient's condition is chronic yet well-managed, as evidenced by a hemoglobin A1c level of under 7.0 percent during today's office visit. The patient will persist with her current regimen of metformin extended-release 500 mg administered twice daily.    2. Anxiety.  The patient's anxiety is chronic and has shown improvement. She reports a 50 percent or more improvement in her symptoms. The current treatment plan of citalopram 10 mg daily will be continued. The patient has reported a slight side effect of dizziness, but it is not as bothersome as it was previously. We will continue to monitor this side effect.    3. Borderline anemia.  The patient was  mildly anemic last year, but in the last month, her hemoglobin has returned to the normal range. However, her hematocrit is still low, indicating borderline anemia. A full work-up for normocytic anemia will be conducted to identify the cause.    4. Chronic left shoulder pain.  This condition is chronic and stable for approximately 2 to 3 months. She has been experiencing left shoulder pain, particularly when shoulders are positioned in specific positions. The shoulder examination suggests biceps tendinitis or strain and weakness in all rotator cuff muscles bilaterally, which I do not believe is contributing to her pain. Physical therapy is recommended, but due to her impending surgery, she will contact me when she is ready for physical therapy for the shoulder.    5. Healthcare maintenance.  The patient has a scheduled COVID-19 booster.    Follow-up  The patient is scheduled for a follow-up visit in 6 months for her Medicare wellness visit and an A1c check.      Please note that this dictation was created using voice recognition software. I have made every reasonable attempt to correct obvious errors, but I expect that there are errors of grammar and possibly content that I did not discover before finalizing the note.

## 2024-04-15 ENCOUNTER — PRE-ADMISSION TESTING (OUTPATIENT)
Dept: ADMISSIONS | Facility: MEDICAL CENTER | Age: 79
End: 2024-04-15
Attending: OBSTETRICS & GYNECOLOGY
Payer: MEDICARE

## 2024-04-15 DIAGNOSIS — Z01.812 PRE-OPERATIVE LABORATORY EXAMINATION: ICD-10-CM

## 2024-04-15 RX ORDER — LEVOCETIRIZINE DIHYDROCHLORIDE 5 MG/1
1 TABLET, FILM COATED ORAL
COMMUNITY

## 2024-04-15 NOTE — OR NURSING
RN pre admit tele appointment complete.  Medication instructions given per anesthesia protocol.  Fasting instructions given per Dr. Williamson's orders - NPO for 12 hours.  Instructed to hold metformin day of surgery and telmisartan for 24 hours.  Patient stated understanding of all instructions and had no further questions.

## 2024-04-16 ENCOUNTER — HOSPITAL ENCOUNTER (OUTPATIENT)
Dept: LAB | Facility: MEDICAL CENTER | Age: 79
End: 2024-04-16
Attending: ORTHOPAEDIC SURGERY
Payer: MEDICARE

## 2024-04-16 ENCOUNTER — HOSPITAL ENCOUNTER (OUTPATIENT)
Dept: LAB | Facility: MEDICAL CENTER | Age: 79
End: 2024-04-16
Attending: FAMILY MEDICINE
Payer: MEDICARE

## 2024-04-16 DIAGNOSIS — Z01.812 PRE-OPERATIVE LABORATORY EXAMINATION: ICD-10-CM

## 2024-04-16 DIAGNOSIS — D64.9 ANEMIA, UNSPECIFIED TYPE: ICD-10-CM

## 2024-04-16 LAB
ANION GAP SERPL CALC-SCNC: 15 MMOL/L (ref 7–16)
APTT PPP: 38.9 SEC (ref 24.7–36)
BASOPHILS # BLD AUTO: 0.4 % (ref 0–1.8)
BASOPHILS # BLD: 0.01 K/UL (ref 0–0.12)
BUN SERPL-MCNC: 12 MG/DL (ref 8–22)
CALCIUM SERPL-MCNC: 8.8 MG/DL (ref 8.5–10.5)
CHLORIDE SERPL-SCNC: 102 MMOL/L (ref 96–112)
CO2 SERPL-SCNC: 20 MMOL/L (ref 20–33)
CREAT SERPL-MCNC: 0.73 MG/DL (ref 0.5–1.4)
EOSINOPHIL # BLD AUTO: 0.01 K/UL (ref 0–0.51)
EOSINOPHIL NFR BLD: 0.4 % (ref 0–6.9)
ERYTHROCYTE [DISTWIDTH] IN BLOOD BY AUTOMATED COUNT: 44.2 FL (ref 35.9–50)
ERYTHROCYTE [SEDIMENTATION RATE] IN BLOOD BY WESTERGREN METHOD: 21 MM/HOUR (ref 0–25)
EST. AVERAGE GLUCOSE BLD GHB EST-MCNC: 146 MG/DL
FOLATE SERPL-MCNC: 13.5 NG/ML
GFR SERPLBLD CREATININE-BSD FMLA CKD-EPI: 83 ML/MIN/1.73 M 2
GLUCOSE SERPL-MCNC: 246 MG/DL (ref 65–99)
HBA1C MFR BLD: 6.7 % (ref 4–5.6)
HCT VFR BLD AUTO: 36.4 % (ref 37–47)
HGB BLD-MCNC: 12.1 G/DL (ref 12–16)
HGB RETIC QN AUTO: 31.4 PG/CELL (ref 29–35)
IMM GRANULOCYTES # BLD AUTO: 0.05 K/UL (ref 0–0.11)
IMM GRANULOCYTES NFR BLD AUTO: 2 % (ref 0–0.9)
IMM RETICS NFR: 18.7 % (ref 2.6–16.1)
INR PPP: 0.99 (ref 0.87–1.13)
LYMPHOCYTES # BLD AUTO: 1.14 K/UL (ref 1–4.8)
LYMPHOCYTES NFR BLD: 46.3 % (ref 22–41)
MCH RBC QN AUTO: 29.7 PG (ref 27–33)
MCHC RBC AUTO-ENTMCNC: 33.2 G/DL (ref 32.2–35.5)
MCV RBC AUTO: 89.2 FL (ref 81.4–97.8)
MONOCYTES # BLD AUTO: 0.16 K/UL (ref 0–0.85)
MONOCYTES NFR BLD AUTO: 6.5 % (ref 0–13.4)
NEUTROPHILS # BLD AUTO: 1.09 K/UL (ref 1.82–7.42)
NEUTROPHILS NFR BLD: 44.4 % (ref 44–72)
NRBC # BLD AUTO: 0 K/UL
NRBC BLD-RTO: 0 /100 WBC (ref 0–0.2)
PLATELET # BLD AUTO: 188 K/UL (ref 164–446)
PMV BLD AUTO: 9.4 FL (ref 9–12.9)
POTASSIUM SERPL-SCNC: 4.7 MMOL/L (ref 3.6–5.5)
PROTHROMBIN TIME: 13.1 SEC (ref 12–14.6)
RBC # BLD AUTO: 4.08 M/UL (ref 4.2–5.4)
RETICS # AUTO: 0.11 M/UL (ref 0.04–0.12)
RETICS/RBC NFR: 2.6 % (ref 0.8–2.6)
SODIUM SERPL-SCNC: 137 MMOL/L (ref 135–145)
VIT B12 SERPL-MCNC: 961 PG/ML (ref 211–911)
WBC # BLD AUTO: 2.5 K/UL (ref 4.8–10.8)

## 2024-04-16 PROCEDURE — 82247 BILIRUBIN TOTAL: CPT

## 2024-04-16 PROCEDURE — 83036 HEMOGLOBIN GLYCOSYLATED A1C: CPT | Mod: GA

## 2024-04-16 PROCEDURE — 85730 THROMBOPLASTIN TIME PARTIAL: CPT | Mod: GA

## 2024-04-16 PROCEDURE — 83010 ASSAY OF HAPTOGLOBIN QUANT: CPT

## 2024-04-16 PROCEDURE — 82607 VITAMIN B-12: CPT

## 2024-04-16 PROCEDURE — 80048 BASIC METABOLIC PNL TOTAL CA: CPT

## 2024-04-16 PROCEDURE — 85610 PROTHROMBIN TIME: CPT | Mod: GA

## 2024-04-16 PROCEDURE — 83615 LACTATE (LD) (LDH) ENZYME: CPT

## 2024-04-16 PROCEDURE — 36415 COLL VENOUS BLD VENIPUNCTURE: CPT | Mod: GA

## 2024-04-16 PROCEDURE — 82248 BILIRUBIN DIRECT: CPT

## 2024-04-16 PROCEDURE — 85046 RETICYTE/HGB CONCENTRATE: CPT

## 2024-04-16 PROCEDURE — 85652 RBC SED RATE AUTOMATED: CPT

## 2024-04-16 PROCEDURE — 82746 ASSAY OF FOLIC ACID SERUM: CPT

## 2024-04-16 PROCEDURE — 85025 COMPLETE CBC W/AUTO DIFF WBC: CPT | Mod: GA

## 2024-04-17 LAB
BILIRUB CONJ SERPL-MCNC: <0.2 MG/DL (ref 0.1–0.5)
BILIRUB INDIRECT SERPL-MCNC: NORMAL MG/DL (ref 0–1)
BILIRUB SERPL-MCNC: 0.7 MG/DL (ref 0.1–1.5)
LDH SERPL L TO P-CCNC: 189 U/L (ref 107–266)

## 2024-04-18 ENCOUNTER — ANESTHESIA EVENT (OUTPATIENT)
Dept: SURGERY | Facility: MEDICAL CENTER | Age: 79
End: 2024-04-18
Payer: MEDICARE

## 2024-04-18 ENCOUNTER — PHARMACY VISIT (OUTPATIENT)
Dept: PHARMACY | Facility: MEDICAL CENTER | Age: 79
End: 2024-04-18
Payer: COMMERCIAL

## 2024-04-18 ENCOUNTER — HOSPITAL ENCOUNTER (OUTPATIENT)
Facility: MEDICAL CENTER | Age: 79
End: 2024-04-18
Attending: ORTHOPAEDIC SURGERY | Admitting: ORTHOPAEDIC SURGERY
Payer: MEDICARE

## 2024-04-18 ENCOUNTER — ANESTHESIA (OUTPATIENT)
Dept: SURGERY | Facility: MEDICAL CENTER | Age: 79
End: 2024-04-18
Payer: MEDICARE

## 2024-04-18 VITALS
RESPIRATION RATE: 16 BRPM | DIASTOLIC BLOOD PRESSURE: 64 MMHG | SYSTOLIC BLOOD PRESSURE: 119 MMHG | BODY MASS INDEX: 29.73 KG/M2 | OXYGEN SATURATION: 95 % | HEIGHT: 63 IN | TEMPERATURE: 97.1 F | WEIGHT: 167.77 LBS | HEART RATE: 82 BPM

## 2024-04-18 DIAGNOSIS — G89.18 POSTOPERATIVE PAIN: ICD-10-CM

## 2024-04-18 LAB
GLUCOSE BLD STRIP.AUTO-MCNC: 175 MG/DL (ref 65–99)
HAPTOGLOB SERPL-MCNC: 118 MG/DL (ref 30–200)

## 2024-04-18 PROCEDURE — 160028 HCHG SURGERY MINUTES - 1ST 30 MINS LEVEL 3: Performed by: ORTHOPAEDIC SURGERY

## 2024-04-18 PROCEDURE — 160048 HCHG OR STATISTICAL LEVEL 1-5: Performed by: ORTHOPAEDIC SURGERY

## 2024-04-18 PROCEDURE — 160035 HCHG PACU - 1ST 60 MINS PHASE I: Performed by: ORTHOPAEDIC SURGERY

## 2024-04-18 PROCEDURE — RXMED WILLOW AMBULATORY MEDICATION CHARGE: Performed by: ORTHOPAEDIC SURGERY

## 2024-04-18 PROCEDURE — 160036 HCHG PACU - EA ADDL 30 MINS PHASE I: Performed by: ORTHOPAEDIC SURGERY

## 2024-04-18 PROCEDURE — 160002 HCHG RECOVERY MINUTES (STAT): Performed by: ORTHOPAEDIC SURGERY

## 2024-04-18 PROCEDURE — 700111 HCHG RX REV CODE 636 W/ 250 OVERRIDE (IP): Performed by: ANESTHESIOLOGY

## 2024-04-18 PROCEDURE — 700105 HCHG RX REV CODE 258: Performed by: ORTHOPAEDIC SURGERY

## 2024-04-18 PROCEDURE — 160039 HCHG SURGERY MINUTES - EA ADDL 1 MIN LEVEL 3: Performed by: ORTHOPAEDIC SURGERY

## 2024-04-18 PROCEDURE — 82962 GLUCOSE BLOOD TEST: CPT

## 2024-04-18 PROCEDURE — 160046 HCHG PACU - 1ST 60 MINS PHASE II: Performed by: ORTHOPAEDIC SURGERY

## 2024-04-18 PROCEDURE — 700101 HCHG RX REV CODE 250: Performed by: ORTHOPAEDIC SURGERY

## 2024-04-18 PROCEDURE — 160009 HCHG ANES TIME/MIN: Performed by: ORTHOPAEDIC SURGERY

## 2024-04-18 PROCEDURE — 160025 RECOVERY II MINUTES (STATS): Performed by: ORTHOPAEDIC SURGERY

## 2024-04-18 PROCEDURE — 700101 HCHG RX REV CODE 250: Performed by: ANESTHESIOLOGY

## 2024-04-18 PROCEDURE — 700111 HCHG RX REV CODE 636 W/ 250 OVERRIDE (IP): Performed by: ORTHOPAEDIC SURGERY

## 2024-04-18 RX ORDER — DIPHENHYDRAMINE HYDROCHLORIDE 50 MG/ML
12.5 INJECTION INTRAMUSCULAR; INTRAVENOUS
Status: DISCONTINUED | OUTPATIENT
Start: 2024-04-18 | End: 2024-04-18 | Stop reason: HOSPADM

## 2024-04-18 RX ORDER — SODIUM CHLORIDE, SODIUM LACTATE, POTASSIUM CHLORIDE, CALCIUM CHLORIDE 600; 310; 30; 20 MG/100ML; MG/100ML; MG/100ML; MG/100ML
INJECTION, SOLUTION INTRAVENOUS CONTINUOUS
Status: DISCONTINUED | OUTPATIENT
Start: 2024-04-18 | End: 2024-04-18 | Stop reason: HOSPADM

## 2024-04-18 RX ORDER — ONDANSETRON 2 MG/ML
INJECTION INTRAMUSCULAR; INTRAVENOUS PRN
Status: DISCONTINUED | OUTPATIENT
Start: 2024-04-18 | End: 2024-04-18 | Stop reason: SURG

## 2024-04-18 RX ORDER — TRAMADOL HYDROCHLORIDE 50 MG/1
50 TABLET ORAL EVERY 4 HOURS PRN
Qty: 18 TABLET | Refills: 0 | Status: SHIPPED | OUTPATIENT
Start: 2024-04-18 | End: 2024-04-21

## 2024-04-18 RX ORDER — BUPIVACAINE HYDROCHLORIDE 5 MG/ML
INJECTION, SOLUTION EPIDURAL; INTRACAUDAL
Status: DISCONTINUED | OUTPATIENT
Start: 2024-04-18 | End: 2024-04-18 | Stop reason: HOSPADM

## 2024-04-18 RX ORDER — CEFAZOLIN SODIUM 1 G/3ML
INJECTION, POWDER, FOR SOLUTION INTRAMUSCULAR; INTRAVENOUS PRN
Status: DISCONTINUED | OUTPATIENT
Start: 2024-04-18 | End: 2024-04-18 | Stop reason: SURG

## 2024-04-18 RX ORDER — OXYCODONE HCL 5 MG/5 ML
10 SOLUTION, ORAL ORAL
Status: DISCONTINUED | OUTPATIENT
Start: 2024-04-18 | End: 2024-04-18 | Stop reason: HOSPADM

## 2024-04-18 RX ORDER — ONDANSETRON 2 MG/ML
4 INJECTION INTRAMUSCULAR; INTRAVENOUS
Status: DISCONTINUED | OUTPATIENT
Start: 2024-04-18 | End: 2024-04-18 | Stop reason: HOSPADM

## 2024-04-18 RX ORDER — MIDAZOLAM HYDROCHLORIDE 1 MG/ML
INJECTION INTRAMUSCULAR; INTRAVENOUS PRN
Status: DISCONTINUED | OUTPATIENT
Start: 2024-04-18 | End: 2024-04-18 | Stop reason: SURG

## 2024-04-18 RX ORDER — OXYCODONE HCL 5 MG/5 ML
5 SOLUTION, ORAL ORAL
Status: DISCONTINUED | OUTPATIENT
Start: 2024-04-18 | End: 2024-04-18 | Stop reason: HOSPADM

## 2024-04-18 RX ORDER — LIDOCAINE HYDROCHLORIDE 20 MG/ML
INJECTION, SOLUTION EPIDURAL; INFILTRATION; INTRACAUDAL; PERINEURAL PRN
Status: DISCONTINUED | OUTPATIENT
Start: 2024-04-18 | End: 2024-04-18 | Stop reason: SURG

## 2024-04-18 RX ORDER — HALOPERIDOL 5 MG/ML
1 INJECTION INTRAMUSCULAR
Status: DISCONTINUED | OUTPATIENT
Start: 2024-04-18 | End: 2024-04-18 | Stop reason: HOSPADM

## 2024-04-18 RX ORDER — CEFAZOLIN SODIUM 1 G/3ML
2 INJECTION, POWDER, FOR SOLUTION INTRAMUSCULAR; INTRAVENOUS ONCE
Status: DISCONTINUED | OUTPATIENT
Start: 2024-04-18 | End: 2024-04-18 | Stop reason: HOSPADM

## 2024-04-18 RX ORDER — METOCLOPRAMIDE HYDROCHLORIDE 5 MG/ML
INJECTION INTRAMUSCULAR; INTRAVENOUS PRN
Status: DISCONTINUED | OUTPATIENT
Start: 2024-04-18 | End: 2024-04-18 | Stop reason: SURG

## 2024-04-18 RX ORDER — MEPERIDINE HYDROCHLORIDE 25 MG/ML
6.25 INJECTION INTRAMUSCULAR; INTRAVENOUS; SUBCUTANEOUS
Status: DISCONTINUED | OUTPATIENT
Start: 2024-04-18 | End: 2024-04-18 | Stop reason: HOSPADM

## 2024-04-18 RX ORDER — DEXMEDETOMIDINE HYDROCHLORIDE 100 UG/ML
INJECTION, SOLUTION INTRAVENOUS PRN
Status: DISCONTINUED | OUTPATIENT
Start: 2024-04-18 | End: 2024-04-18 | Stop reason: HOSPADM

## 2024-04-18 RX ADMIN — SODIUM CHLORIDE, POTASSIUM CHLORIDE, SODIUM LACTATE AND CALCIUM CHLORIDE: 600; 310; 30; 20 INJECTION, SOLUTION INTRAVENOUS at 06:49

## 2024-04-18 RX ADMIN — DEXMEDETOMIDINE HYDROCHLORIDE 10 MCG: 100 INJECTION, SOLUTION INTRAVENOUS at 07:42

## 2024-04-18 RX ADMIN — CEFAZOLIN 2 G: 1 INJECTION, POWDER, FOR SOLUTION INTRAMUSCULAR; INTRAVENOUS at 07:46

## 2024-04-18 RX ADMIN — PROPOFOL 100 MG: 10 INJECTION, EMULSION INTRAVENOUS at 07:42

## 2024-04-18 RX ADMIN — METOCLOPRAMIDE 10 MG: 5 INJECTION, SOLUTION INTRAMUSCULAR; INTRAVENOUS at 07:57

## 2024-04-18 RX ADMIN — FENTANYL CITRATE 50 MCG: 50 INJECTION, SOLUTION INTRAMUSCULAR; INTRAVENOUS at 07:59

## 2024-04-18 RX ADMIN — LIDOCAINE HYDROCHLORIDE 100 MG: 20 INJECTION, SOLUTION EPIDURAL; INFILTRATION; INTRACAUDAL at 07:42

## 2024-04-18 RX ADMIN — FENTANYL CITRATE 50 MCG: 50 INJECTION, SOLUTION INTRAMUSCULAR; INTRAVENOUS at 07:42

## 2024-04-18 RX ADMIN — ONDANSETRON 4 MG: 2 INJECTION INTRAMUSCULAR; INTRAVENOUS at 07:57

## 2024-04-18 RX ADMIN — VANCOMYCIN HYDROCHLORIDE 1500 MG: 5 INJECTION, POWDER, LYOPHILIZED, FOR SOLUTION INTRAVENOUS at 06:50

## 2024-04-18 RX ADMIN — CEFAZOLIN 2 G: 1 INJECTION, POWDER, FOR SOLUTION INTRAMUSCULAR; INTRAVENOUS at 07:00

## 2024-04-18 RX ADMIN — MIDAZOLAM HYDROCHLORIDE 2 MG: 1 INJECTION, SOLUTION INTRAMUSCULAR; INTRAVENOUS at 07:39

## 2024-04-18 ASSESSMENT — FIBROSIS 4 INDEX: FIB4 SCORE: 2.02

## 2024-04-18 ASSESSMENT — PAIN DESCRIPTION - PAIN TYPE: TYPE: SURGICAL PAIN

## 2024-04-18 ASSESSMENT — PAIN SCALES - GENERAL: PAIN_LEVEL: 0

## 2024-04-18 NOTE — ANESTHESIA PREPROCEDURE EVALUATION
Case: [id number] Date/Time: 04/18/24 0715    Procedure: RIGHT HAND CARPAL TUNNEL RELEASE    Pre-op diagnosis: RIGHT CARPAL TUNNEL SYNDROME    Location: [facility] OR 04 / SURGERY [facility]    Surgeons: Chato Williamson M.D.          77yo female for CTR   H/O sleep apnea, HTN, GERD, hiatal hernia, NIDDM  Concern for aspiration, was hospitalized after colonoscopy with suspicion pt very anxious  Relevant Problems   ANESTHESIA   (positive) Sleep apnea      CARDIAC   (positive) Atherosclerosis of aorta (HCC)   (positive) Essential hypertension      GI   (positive) Gastroesophageal reflux disease without esophagitis   (positive) Hiatal hernia         (positive) CKD stage G2/A2, GFR 60-89 and albumin creatinine ratio  mg/g      ENDO   (positive) Hypothyroidism due to acquired atrophy of thyroid   (positive) Type 2 diabetes mellitus (HCC)       Physical Exam    Airway   Mallampati: II  TM distance: >3 FB  Neck ROM: full       Cardiovascular - normal exam  Rhythm: regular  Rate: normal  (-) murmur     Dental - normal exam           Pulmonary - normal exam  Breath sounds clear to auscultation     Abdominal    Neurological - normal exam                   Anesthesia Plan    ASA 3   ASA physical status 3 criteria: diabetes - poorly controlled    Plan - general       Airway plan will be natural airway          Induction: intravenous    Postoperative Plan: Postoperative administration of opioids is intended.    Pertinent diagnostic labs and testing reviewed    Informed Consent:    Anesthetic plan and risks discussed with patient.    Use of blood products discussed with: patient whom consented to blood products.           
No

## 2024-04-18 NOTE — ANESTHESIA POSTPROCEDURE EVALUATION
Patient: Kristin Barrios    Procedure Summary       Date: 04/18/24 Room / Location: [facility] 04 / SURGERY [facility]    Anesthesia Start: 0739 Anesthesia Stop: 0816    Procedure: RIGHT HAND CARPAL TUNNEL RELEASE OPEN (Right: Wrist) Diagnosis: (RIGHT CARPAL TUNNEL SYNDROME)    Surgeons: Chato Williamson M.D. Responsible Provider: Briana Fox M.D.    Anesthesia Type: general ASA Status: 3            Final Anesthesia Type: general  Last vitals  BP   Blood Pressure : 119/64    Temp   36.2 °C (97.1 °F)    Pulse   82   Resp   16    SpO2   95 %      Anesthesia Post Evaluation    Patient location during evaluation: PACU  Patient participation: complete - patient participated  Level of consciousness: awake and alert  Pain score: 0    Airway patency: patent  Anesthetic complications: no  Cardiovascular status: hemodynamically stable  Respiratory status: acceptable  Hydration status: euvolemic    PONV: none          No notable events documented.     Nurse Pain Score: 2 (NPRS)

## 2024-04-18 NOTE — ANESTHESIA PROCEDURE NOTES
Airway    Date/Time: 4/18/2024 7:40 AM    Performed by: Briana Fox M.D.  Authorized by: Briana Fox M.D.    Location:  OR  Urgency:  Elective  Indications for Airway Management:  Anesthesia      Spontaneous Ventilation: absent    Sedation Level:  Deep  Preoxygenated: Yes    Mask Difficulty Assessment:  1 - vent by mask  Final Airway Type:  Supraglottic airway  Final Supraglottic Airway:  Standard LMA    SGA Size:  4  Number of Attempts at Approach:  1  Number of Other Approaches Attempted:  0

## 2024-04-18 NOTE — ANESTHESIA TIME REPORT
Anesthesia Start and Stop Event Times       Date Time Event    4/18/2024 0731 Ready for Procedure     0739 Anesthesia Start     0816 Anesthesia Stop          Responsible Staff  04/18/24      Name Role Begin End    Briana Fox M.D. Anesth 0739 0816          Overtime Reason:  no overtime (within assigned shift)    Comments:

## 2024-04-18 NOTE — DISCHARGE INSTRUCTIONS
HOME CARE INSTRUCTIONS    ACTIVITY: Rest and take it easy for the first 24 hours.  A responsible adult is recommended to remain with you during that time.  It is normal to feel sleepy.  We encourage you to not do anything that requires balance, judgment or coordination.    FOR 24 HOURS DO NOT:  Drive, operate machinery or run household appliances.  Drink beer or alcoholic beverages.  Make important decisions or sign legal documents.    SPECIAL INSTRUCTIONS: Carpal Tunnel Release, Care After  This sheet gives you information about how to care for yourself after your procedure. Your health care provider may also give you more specific instructions. If you have problems or questions, contact your health care provider.  What can I expect after the procedure?  After the procedure, it is common to have:  Pain.  Swelling.  Wrist stiffness.  Bruising.  Follow these instructions at home:  Medicines  Take over-the-counter and prescription medicines only as told by your health care provider.  Ask your health care provider if the medicine prescribed to you:  Requires you to avoid driving or using machinery.  Can cause constipation. You may need to take these actions to prevent or treat constipation:  Drink enough fluid to keep your urine pale yellow.  Take over-the-counter or prescription medicines.  Eat foods that are high in fiber, such as beans, whole grains, and fresh fruits and vegetables.  Limit foods that are high in fat and processed sugars, such as fried or sweet foods.  Bathing  Do not take baths, swim, or use a hot tub until your health care provider approves. Ask your health care provider if you may take showers.  Keep your bandage (dressing) dry until your health care provider says it can be removed. Cover it with a watertight covering when you take a bath or a shower.  If you have a splint or brace:  Wear the splint or brace as told by your health care provider. You may need to wear it for 2-3 weeks. Remove it  only as told by your health care provider.  Loosen the splint or brace if your fingers tingle, become numb, or turn cold and blue.  Keep the splint or brace clean.  If the splint or brace is not waterproof:  Do not let it get wet.  Cover it with a watertight covering when you take a bath or a shower.  Incision care    After the compression bandage has been removed, follow instructions from your health care provider about how to take care of your incision. Make sure you:  Wash your hands with soap and water for at least 20 seconds before and after you change your bandage (dressing). If soap and water are not available, use hand .  Change your dressing as told by your health care provider.  Leave stitches (sutures), skin glue, or adhesive strips in place. These skin closures may need to stay in place for 2 weeks or longer. If adhesive strip edges start to loosen and curl up, you may trim the loose edges. Do not remove adhesive strips completely unless your health care provider tells you to do that.  Check your incision area every day for signs of infection. Check for:  Redness.  More swelling or pain.  Fluid or blood.  Warmth.  Pus or a bad smell.  Managing pain, stiffness, and swelling  If directed, put ice on the affected area.  If you have a removable splint or brace, remove it as told by your health care provider.  Put ice in a plastic bag.  Place a towel between your skin and the bag.  Leave the ice on for 20 minutes, 2-3 times a day. Do not fall asleep with ice pack on your skin.  Remove the ice if your skin turns bright red. This is very important. If you cannot feel pain, heat, or cold, you have a greater risk of damage to the area.  Move your fingers often to avoid stiffness and to lessen swelling.  Raise (elevate) your wrist above the level of your heart while you are sitting or lying down.  Activity  Do not drive until your health care provider approves.  Use your hand carefully. Do not do  activities that cause pain. You should be able to do light activities with your hand.  Do not lift with your affected hand until your health care provider approves.  Avoid pulling and pushing with the injured arm.  Return to your normal activities as told by your health care provider. Ask your health care provider what activities are safe for you.  If physical therapy was prescribed, do exercises as told by your therapist. Physical therapy can help you heal faster and regain movement.  General instructions  Do not use any products that contain nicotine or tobacco, such as cigarettes and e-cigarettes. These can delay incision healing after surgery. If you need help quitting, ask your health care provider.  Keep all follow-up visits. This is important. These include visits for physical therapy.  Contact a health care provider if:  You have redness around your incision.  You have more swelling or pain.  You have fluid or blood coming from your incision.  Your incision feels warm to the touch.  You have pus or a bad smell coming from your incision.  You have a fever or chills.  You have pain that does not get better with medicine.  Your carpal tunnel symptoms do not go away after 2 months.  Your carpal tunnel symptoms go away and then come back.  Get help right away if:  You have pain or numbness that is getting worse.  Your fingers or fingertips become very pale or bluish in color.  You are not able to move your fingers.  Summary  It is common to have wrist stiffness and bruising after a carpal tunnel release.  Icing and raising (elevating) your wrist may help to lessen swelling and pain.  Call your health care provider if you have a fever or notice any signs of infection in your incision area.  This information is not intended to replace advice given to you by your health care provider. Make sure you discuss any questions you have with your health care provider.  Document Revised: 04/22/2021 Document Reviewed:  04/22/2021  [employer] Patient Education © 2023 [employer]    Follow-up with Teri in 2 1/2 weeks  Can take dressing off to shower    DIET: To avoid nausea, slowly advance diet as tolerated, avoiding spicy or greasy foods for the first day.  Add more substantial food to your diet according to your physician's instructions.  Babies can be fed formula or breast milk as soon as they are hungry.  INCREASE FLUIDS AND FIBER TO AVOID CONSTIPATION.    SURGICAL DRESSING/BATHING: May shower, no submerging in water for one week.    MEDICATIONS: Resume taking daily medication.  Take prescribed pain medication with food.  If no medication is prescribed, you may take non-aspirin pain medication if needed.  PAIN MEDICATION CAN BE VERY CONSTIPATING.  Take a stool softener or laxative such as senokot, pericolace, or milk of magnesia if needed.    Prescription given for Tramadol.      A follow-up appointment should be arranged with your doctor in 2.5 weeks; call to schedule.    You should CALL YOUR PHYSICIAN if you develop:  Fever greater than 101 degrees F.  Pain not relieved by medication, or persistent nausea or vomiting.  Excessive bleeding (blood soaking through dressing) or unexpected drainage from the wound.  Extreme redness or swelling around the incision site, drainage of pus or foul smelling drainage.  Inability to urinate or empty your bladder within 8 hours.  Problems with breathing or chest pain.    You should call 911 if you develop problems with breathing or chest pain.  If you are unable to contact your doctor or surgical center, you should go to the nearest emergency room or urgent care center.  Physician's telephone #: [phone]    MILD FLU-LIKE SYMPTOMS ARE NORMAL.  YOU MAY EXPERIENCE GENERALIZED MUSCLE ACHES, THROAT IRRITATION, HEADACHE AND/OR SOME NAUSEA.    If any questions arise, call your doctor.  If your doctor is not available, please feel free to call the Surgical Center at [phone].  The Center  is open Monday through Friday from 7AM to 7PM.      A registered nurse may call you a few days after your surgery to see how you are doing after your procedure.    You may also receive a survey in the mail within the next two weeks and we ask that you take a few moments to complete the survey and return it to us.  Our goal is to provide you with very good care and we value your comments.     Depression / Suicide Risk    As [facility] are [facility], it is important to learn how to keep safe from harming yourself.    Recognize the warning signs:  Abrupt changes in personality, positive or negative- including increase in energy   Giving away possessions  Change in eating patterns- significant weight changes-  positive or negative  Change in sleeping patterns- unable to sleep or sleeping all the time   Unwillingness or inability to communicate  Depression  Unusual sadness, discouragement and loneliness  Talk of wanting to die  Neglect of personal appearance   Rebelliousness- reckless behavior  Withdrawal from people/activities they love  Confusion- inability to concentrate     If you or a loved one observes any of these behaviors or has concerns about self-harm, here's what you can do:  Talk about it- your feelings and reasons for harming yourself  Remove any means that you might use to hurt yourself (examples: pills, rope, extension cords, firearm)  Get professional help from the community (Mental Health, Substance Abuse, psychological counseling)  Do not be alone:Call your Safe Contact- someone whom you trust who will be there for you.  Call your local CRISIS HOTLINE 994-0456 or 011-279-1148  Call your local Children's Mobile Crisis Response Team [place] (174) 746-3008 or www.[employer]  Call the toll free National Suicide Prevention Hotlines   National Suicide Prevention Lifeline 304-249-MGMK (1629)  National Hope Line Network 800-SUICIDE (371-3198)    I acknowledge receipt and  understanding of these Home Care instructions.

## 2024-04-18 NOTE — OR NURSING
Pt A&OX4. VSS. Pt on room air. Afebrile. R hand dressing CDI. RUE elevated on pillow. Ice pack in place intermittently. Pt able to move fingers and reports R hand numb and no pain. Less than three second capillary refill to R fingers. Pt declined pain medication while in pacu. No complaints of nausea, tolerated sips of water.  Report given to LATOYA Mckeon. Pt to Phase II.

## 2024-04-18 NOTE — OP REPORT
DATE OF SERVICE:  04/18/2024     SERVICE:  Orthopedic Surgery.     PREOPERATIVE DIAGNOSIS:  Right hand, wrist carpal tunnel syndrome.     POSTOPERATIVE DIAGNOSIS:  Right hand, wrist carpal tunnel syndrome.     PROCEDURE:  Right open carpal tunnel release.     SURGEON:  Chato Williamson MD     ASSISTANT SURGEON:  None.     ANESTHESIA:  General.     ANESTHESIOLOGIST:  Briana Fox MD     COMPLICATIONS:  None.     ESTIMATED BLOOD LOSS:  2 mL.     DESCRIPTION OF PROCEDURE:  After informed consent was obtained, the patient   was brought to the operating room and given general anesthetic.  Her right   upper extremity was prepped and draped in the usual sterile fashion after   Ancef was given.  After the field was draped, a time-out was called, correctly   identifying the patient and the procedure to be done.  Limb was then   exsanguinated and tourniquet was inflated to 250 mmHg.  We then made a   longitudinal midline incision and carefully dissected down through the   subcutaneous adipose layer.  We dissected down to the palmaris brevis fascia.    We then dissected through the palmaris brevis fascia and musculature.  We   then arrived at the transverse carpal ligament.  We then divided the   transverse carpal ligament.  We then expanded our division proximally and   distally to release the entire transverse carpal ligament.  A Eva clamp was   placed proximally and distally to make sure that the entire carpal tunnel was   decompressed.  We then irrigated the wound and closed it in layers.  The   subcutaneous layer was closed with 2-0 Vicryl.  This closure was completed   with 3-0 nylon suture.  We injected 9 mL of 0.5% Marcaine plain in the wound   as local anesthetic.  Wound dressing was applied and the tourniquet was let   down at 10 minutes.  Blood loss was approximately 2 mL.  No complications were   experienced and the patient was aroused from general anesthesia and brought   in stable condition to the recovery  room.        ______________________________  MD WARD AGUILAR/CHRISTY    DD:  04/18/2024 08:12  DT:  04/18/2024 08:21    Job#:  [id number]

## 2024-04-18 NOTE — OR NURSING
Pt's VSS; denies N/V; states pain is at tolerable level. Dressing CDI to right hand. D/c orders received. IV dc'd. Pt changed into clothing with assistance. Pt up and ambulated to BR, steady gait, voided adequately. Discharge instructions given as well as pain management handout; pt and family verbalized understanding and questions answered. Patient states ready to d/c home. Prescriptions with patient. Pt dc'd in w/c with RN in stable condition.

## 2024-04-18 NOTE — OR SURGEON
Immediate Post OP Note    PreOp Diagnosis: r cts      PostOp Diagnosis: same      Procedure(s):  RIGHT HAND CARPAL TUNNEL RELEASE OPEN - Wound Class: Clean    Surgeon(s):  Chato Williamson M.D.    Anesthesiologist/Type of Anesthesia:  Anesthesiologist: Briana Fox M.D./General    Surgical Staff:  Circulator: Alyson Gilbert R.N.  Scrub Person: Larry Garcia [place]: [employer]    Specimens removed if any:  * No specimens in log *    Estimated Blood Loss: 2cc    Findings: none    Complications: none        4/18/2024 8:09 AM Chato Williamson M.D.

## 2024-04-20 DIAGNOSIS — E03.4 HYPOTHYROIDISM DUE TO ACQUIRED ATROPHY OF THYROID: ICD-10-CM

## 2024-04-22 RX ORDER — LEVOTHYROXINE SODIUM 0.07 MG/1
TABLET ORAL
Qty: 90 TABLET | Refills: 3 | Status: SHIPPED | OUTPATIENT
Start: 2024-04-22

## 2024-04-22 NOTE — TELEPHONE ENCOUNTER
Received request via: Pharmacy    Was the patient seen in the last year in this department? Yes    Does the patient have an active prescription (recently filled or refills available) for medication(s) requested? No    [facility]    Does the patient have long-term Plus and need 100 day supply (blood pressure, diabetes and cholesterol meds only)? Patient does not have SCP

## 2024-05-17 RX ORDER — CITALOPRAM HYDROBROMIDE 10 MG/1
10 TABLET ORAL DAILY
Qty: 90 TABLET | Refills: 3 | Status: SHIPPED | OUTPATIENT
Start: 2024-05-17

## 2024-06-15 DIAGNOSIS — E11.29 TYPE 2 DIABETES MELLITUS WITH MICROALBUMINURIA, WITHOUT LONG-TERM CURRENT USE OF INSULIN (HCC): ICD-10-CM

## 2024-06-15 DIAGNOSIS — R80.9 TYPE 2 DIABETES MELLITUS WITH MICROALBUMINURIA, WITHOUT LONG-TERM CURRENT USE OF INSULIN (HCC): ICD-10-CM

## 2024-06-15 DIAGNOSIS — E78.2 MIXED HYPERLIPIDEMIA: ICD-10-CM

## 2024-06-17 RX ORDER — METFORMIN HYDROCHLORIDE 500 MG/1
TABLET, EXTENDED RELEASE ORAL
Qty: 270 TABLET | Refills: 3 | Status: SHIPPED | OUTPATIENT
Start: 2024-06-17

## 2024-06-17 RX ORDER — ATORVASTATIN CALCIUM 40 MG/1
TABLET, FILM COATED ORAL
Qty: 90 TABLET | Refills: 3 | Status: SHIPPED | OUTPATIENT
Start: 2024-06-17

## 2024-06-17 NOTE — TELEPHONE ENCOUNTER
Received request via: Pharmacy    Was the patient seen in the last year in this department? Yes    Does the patient have an active prescription (recently filled or refills available) for medication(s) requested? No    Pharmacy Name: [facility]     Does the patient have half-way Plus and need 100 day supply (blood pressure, diabetes and cholesterol meds only)? Patient does not have SCP

## 2024-07-19 ENCOUNTER — OFFICE VISIT (OUTPATIENT)
Dept: URGENT CARE | Facility: CLINIC | Age: 79
End: 2024-07-19
Payer: MEDICARE

## 2024-07-19 VITALS
SYSTOLIC BLOOD PRESSURE: 130 MMHG | TEMPERATURE: 95.6 F | OXYGEN SATURATION: 97 % | HEART RATE: 87 BPM | BODY MASS INDEX: 30.39 KG/M2 | DIASTOLIC BLOOD PRESSURE: 78 MMHG | HEIGHT: 64 IN | RESPIRATION RATE: 16 BRPM | WEIGHT: 178 LBS

## 2024-07-19 DIAGNOSIS — N30.01 ACUTE CYSTITIS WITH HEMATURIA: ICD-10-CM

## 2024-07-19 LAB
APPEARANCE UR: NORMAL
BILIRUB UR STRIP-MCNC: NEGATIVE MG/DL
COLOR UR AUTO: YELLOW
GLUCOSE UR STRIP.AUTO-MCNC: NEGATIVE MG/DL
KETONES UR STRIP.AUTO-MCNC: NEGATIVE MG/DL
LEUKOCYTE ESTERASE UR QL STRIP.AUTO: NORMAL
NITRITE UR QL STRIP.AUTO: NEGATIVE
PH UR STRIP.AUTO: 5 [PH] (ref 5–8)
PROT UR QL STRIP: 100 MG/DL
RBC UR QL AUTO: NORMAL
SP GR UR STRIP.AUTO: 1.01
UROBILINOGEN UR STRIP-MCNC: 0.2 MG/DL

## 2024-07-19 PROCEDURE — 99214 OFFICE O/P EST MOD 30 MIN: CPT | Performed by: REGISTERED NURSE

## 2024-07-19 PROCEDURE — 3075F SYST BP GE 130 - 139MM HG: CPT | Performed by: REGISTERED NURSE

## 2024-07-19 PROCEDURE — 81002 URINALYSIS NONAUTO W/O SCOPE: CPT | Performed by: REGISTERED NURSE

## 2024-07-19 PROCEDURE — 3078F DIAST BP <80 MM HG: CPT | Performed by: REGISTERED NURSE

## 2024-07-19 RX ORDER — ESTRADIOL 0.1 MG/G
CREAM VAGINAL
COMMUNITY

## 2024-07-19 RX ORDER — CEPHALEXIN 500 MG/1
500 CAPSULE ORAL 2 TIMES DAILY
Qty: 14 CAPSULE | Refills: 0 | Status: SHIPPED | OUTPATIENT
Start: 2024-07-19 | End: 2024-07-26

## 2024-07-19 RX ORDER — AMOXICILLIN 500 MG/1
CAPSULE ORAL
COMMUNITY
End: 2024-07-19

## 2024-07-19 ASSESSMENT — ENCOUNTER SYMPTOMS
ABDOMINAL PAIN: 0
SHORTNESS OF BREATH: 0
FEVER: 0
NAUSEA: 0
CHILLS: 0
VOMITING: 0
DIZZINESS: 0
FLANK PAIN: 0

## 2024-07-19 ASSESSMENT — FIBROSIS 4 INDEX: FIB4 SCORE: 2.02

## 2024-08-05 DIAGNOSIS — K21.9 GASTROESOPHAGEAL REFLUX DISEASE WITHOUT ESOPHAGITIS: ICD-10-CM

## 2024-10-14 SDOH — ECONOMIC STABILITY: FOOD INSECURITY: WITHIN THE PAST 12 MONTHS, THE FOOD YOU BOUGHT JUST DIDN'T LAST AND YOU DIDN'T HAVE MONEY TO GET MORE.: NEVER TRUE

## 2024-10-14 SDOH — HEALTH STABILITY: PHYSICAL HEALTH: ON AVERAGE, HOW MANY DAYS PER WEEK DO YOU ENGAGE IN MODERATE TO STRENUOUS EXERCISE (LIKE A BRISK WALK)?: 7 DAYS

## 2024-10-14 SDOH — ECONOMIC STABILITY: FOOD INSECURITY: WITHIN THE PAST 12 MONTHS, YOU WORRIED THAT YOUR FOOD WOULD RUN OUT BEFORE YOU GOT MONEY TO BUY MORE.: NEVER TRUE

## 2024-10-14 SDOH — HEALTH STABILITY: PHYSICAL HEALTH: ON AVERAGE, HOW MANY MINUTES DO YOU ENGAGE IN EXERCISE AT THIS LEVEL?: 40 MIN

## 2024-10-14 SDOH — ECONOMIC STABILITY: INCOME INSECURITY: IN THE LAST 12 MONTHS, WAS THERE A TIME WHEN YOU WERE NOT ABLE TO PAY THE MORTGAGE OR RENT ON TIME?: NO

## 2024-10-14 SDOH — HEALTH STABILITY: MENTAL HEALTH
STRESS IS WHEN SOMEONE FEELS TENSE, NERVOUS, ANXIOUS, OR CAN'T SLEEP AT NIGHT BECAUSE THEIR MIND IS TROUBLED. HOW STRESSED ARE YOU?: ONLY A LITTLE

## 2024-10-14 ASSESSMENT — SOCIAL DETERMINANTS OF HEALTH (SDOH)
HOW MANY DRINKS CONTAINING ALCOHOL DO YOU HAVE ON A TYPICAL DAY WHEN YOU ARE DRINKING: PATIENT DOES NOT DRINK
HOW OFTEN DO YOU GET TOGETHER WITH FRIENDS OR RELATIVES?: MORE THAN THREE TIMES A WEEK
HOW OFTEN DO YOU ATTEND CHURCH OR RELIGIOUS SERVICES?: PATIENT DECLINED
IN A TYPICAL WEEK, HOW MANY TIMES DO YOU TALK ON THE PHONE WITH FAMILY, FRIENDS, OR NEIGHBORS?: MORE THAN THREE TIMES A WEEK
HOW OFTEN DO YOU ATTENT MEETINGS OF THE CLUB OR ORGANIZATION YOU BELONG TO?: MORE THAN 4 TIMES PER YEAR
IN THE PAST 12 MONTHS, HAS THE ELECTRIC, GAS, OIL, OR WATER COMPANY THREATENED TO SHUT OFF SERVICE IN YOUR HOME?: NO
IN A TYPICAL WEEK, HOW MANY TIMES DO YOU TALK ON THE PHONE WITH FAMILY, FRIENDS, OR NEIGHBORS?: MORE THAN THREE TIMES A WEEK
HOW OFTEN DO YOU ATTEND CHURCH OR RELIGIOUS SERVICES?: PATIENT DECLINED
HOW OFTEN DO YOU HAVE SIX OR MORE DRINKS ON ONE OCCASION: NEVER
HOW OFTEN DO YOU ATTENT MEETINGS OF THE CLUB OR ORGANIZATION YOU BELONG TO?: MORE THAN 4 TIMES PER YEAR
WITHIN THE PAST 12 MONTHS, YOU WORRIED THAT YOUR FOOD WOULD RUN OUT BEFORE YOU GOT THE MONEY TO BUY MORE: NEVER TRUE
HOW OFTEN DO YOU GET TOGETHER WITH FRIENDS OR RELATIVES?: MORE THAN THREE TIMES A WEEK

## 2024-10-14 ASSESSMENT — LIFESTYLE VARIABLES
HOW OFTEN DO YOU HAVE SIX OR MORE DRINKS ON ONE OCCASION: NEVER
HOW MANY STANDARD DRINKS CONTAINING ALCOHOL DO YOU HAVE ON A TYPICAL DAY: PATIENT DOES NOT DRINK

## 2024-10-17 ENCOUNTER — OFFICE VISIT (OUTPATIENT)
Dept: MEDICAL GROUP | Facility: PHYSICIAN GROUP | Age: 79
End: 2024-10-17
Payer: MEDICARE

## 2024-10-17 VITALS
OXYGEN SATURATION: 97 % | SYSTOLIC BLOOD PRESSURE: 132 MMHG | HEART RATE: 87 BPM | DIASTOLIC BLOOD PRESSURE: 80 MMHG | WEIGHT: 177 LBS | BODY MASS INDEX: 30.38 KG/M2 | TEMPERATURE: 97.2 F

## 2024-10-17 DIAGNOSIS — E78.2 MIXED HYPERLIPIDEMIA: ICD-10-CM

## 2024-10-17 DIAGNOSIS — F41.9 ANXIETY: ICD-10-CM

## 2024-10-17 DIAGNOSIS — I10 ESSENTIAL HYPERTENSION: ICD-10-CM

## 2024-10-17 DIAGNOSIS — I70.0 ATHEROSCLEROSIS OF AORTA (HCC): ICD-10-CM

## 2024-10-17 DIAGNOSIS — N39.0 RECURRENT UTI: ICD-10-CM

## 2024-10-17 DIAGNOSIS — G47.33 OBSTRUCTIVE SLEEP APNEA SYNDROME: ICD-10-CM

## 2024-10-17 DIAGNOSIS — N18.2 CKD STAGE G2/A2, GFR 60-89 AND ALBUMIN CREATININE RATIO 30-299 MG/G: ICD-10-CM

## 2024-10-17 DIAGNOSIS — E11.29 TYPE 2 DIABETES MELLITUS WITH DIABETIC MICROALBUMINURIA, WITHOUT LONG-TERM CURRENT USE OF INSULIN (HCC): ICD-10-CM

## 2024-10-17 DIAGNOSIS — N32.81 OVERACTIVE BLADDER: ICD-10-CM

## 2024-10-17 DIAGNOSIS — Z00.00 ENCOUNTER FOR MEDICARE ANNUAL WELLNESS EXAM: ICD-10-CM

## 2024-10-17 DIAGNOSIS — K21.9 GASTROESOPHAGEAL REFLUX DISEASE WITHOUT ESOPHAGITIS: ICD-10-CM

## 2024-10-17 DIAGNOSIS — Z23 NEED FOR VACCINATION: ICD-10-CM

## 2024-10-17 DIAGNOSIS — E03.4 HYPOTHYROIDISM DUE TO ACQUIRED ATROPHY OF THYROID: ICD-10-CM

## 2024-10-17 DIAGNOSIS — K44.9 HIATAL HERNIA: ICD-10-CM

## 2024-10-17 DIAGNOSIS — R80.9 TYPE 2 DIABETES MELLITUS WITH DIABETIC MICROALBUMINURIA, WITHOUT LONG-TERM CURRENT USE OF INSULIN (HCC): ICD-10-CM

## 2024-10-17 LAB
HBA1C MFR BLD: 6.8 % (ref ?–5.8)
POCT INT CON NEG: NEGATIVE
POCT INT CON POS: POSITIVE

## 2024-10-17 PROCEDURE — 90662 IIV NO PRSV INCREASED AG IM: CPT | Performed by: FAMILY MEDICINE

## 2024-10-17 PROCEDURE — 83036 HEMOGLOBIN GLYCOSYLATED A1C: CPT | Performed by: FAMILY MEDICINE

## 2024-10-17 PROCEDURE — G0439 PPPS, SUBSEQ VISIT: HCPCS | Mod: 25 | Performed by: FAMILY MEDICINE

## 2024-10-17 PROCEDURE — G0008 ADMIN INFLUENZA VIRUS VAC: HCPCS | Performed by: FAMILY MEDICINE

## 2024-10-17 PROCEDURE — 3079F DIAST BP 80-89 MM HG: CPT | Performed by: FAMILY MEDICINE

## 2024-10-17 PROCEDURE — 99213 OFFICE O/P EST LOW 20 MIN: CPT | Mod: 25 | Performed by: FAMILY MEDICINE

## 2024-10-17 PROCEDURE — 3075F SYST BP GE 130 - 139MM HG: CPT | Performed by: FAMILY MEDICINE

## 2024-10-17 RX ORDER — CITALOPRAM HYDROBROMIDE 10 MG/1
10 TABLET ORAL DAILY
Qty: 90 TABLET | Refills: 3 | Status: SHIPPED | OUTPATIENT
Start: 2024-10-17

## 2024-10-17 RX ORDER — HYDROXYZINE HYDROCHLORIDE 25 MG/1
25 TABLET, FILM COATED ORAL 3 TIMES DAILY PRN
Qty: 30 TABLET | Refills: 0 | Status: SHIPPED | OUTPATIENT
Start: 2024-10-17

## 2024-10-17 RX ORDER — CETIRIZINE HYDROCHLORIDE 10 MG/1
10 TABLET ORAL DAILY
COMMUNITY

## 2024-10-17 ASSESSMENT — PATIENT HEALTH QUESTIONNAIRE - PHQ9: CLINICAL INTERPRETATION OF PHQ2 SCORE: 0

## 2024-10-17 ASSESSMENT — ENCOUNTER SYMPTOMS: GENERAL WELL-BEING: GOOD

## 2024-10-17 ASSESSMENT — ACTIVITIES OF DAILY LIVING (ADL): BATHING_REQUIRES_ASSISTANCE: 0

## 2024-10-17 ASSESSMENT — FIBROSIS 4 INDEX: FIB4 SCORE: 2.02

## 2025-01-29 DIAGNOSIS — I10 ESSENTIAL HYPERTENSION: ICD-10-CM

## 2025-01-30 RX ORDER — TELMISARTAN 80 MG/1
80 TABLET ORAL
Qty: 90 TABLET | Refills: 3 | Status: SHIPPED | OUTPATIENT
Start: 2025-01-30

## 2025-01-30 RX ORDER — CARVEDILOL 12.5 MG/1
12.5 TABLET ORAL 2 TIMES DAILY WITH MEALS
Qty: 180 TABLET | Refills: 3 | Status: SHIPPED | OUTPATIENT
Start: 2025-01-30

## 2025-02-27 ENCOUNTER — HOSPITAL ENCOUNTER (OUTPATIENT)
Dept: RADIOLOGY | Facility: MEDICAL CENTER | Age: 80
End: 2025-02-27
Attending: FAMILY MEDICINE
Payer: MEDICARE

## 2025-02-27 DIAGNOSIS — Z12.31 ENCOUNTER FOR MAMMOGRAM TO ESTABLISH BASELINE MAMMOGRAM: ICD-10-CM

## 2025-02-27 PROCEDURE — 77063 BREAST TOMOSYNTHESIS BI: CPT

## 2025-02-28 DIAGNOSIS — E03.4 HYPOTHYROIDISM DUE TO ACQUIRED ATROPHY OF THYROID: ICD-10-CM

## 2025-03-03 ENCOUNTER — RESULTS FOLLOW-UP (OUTPATIENT)
Dept: MEDICAL GROUP | Facility: PHYSICIAN GROUP | Age: 80
End: 2025-03-03

## 2025-03-03 NOTE — TELEPHONE ENCOUNTER
Received request via: Pharmacy    Was the patient seen in the last year in this department? Yes    Does the patient have an active prescription (recently filled or refills available) for medication(s) requested? No    [facility]    Does the patient have MCFP Plus and need 100-day supply? (This applies to ALL medications) Patient does not have SCP

## 2025-03-04 RX ORDER — LEVOTHYROXINE SODIUM 75 UG/1
75 TABLET ORAL
Qty: 90 TABLET | Refills: 0 | Status: SHIPPED | OUTPATIENT
Start: 2025-03-04

## 2025-04-17 ENCOUNTER — HOSPITAL ENCOUNTER (OUTPATIENT)
Facility: MEDICAL CENTER | Age: 80
End: 2025-04-17
Attending: FAMILY MEDICINE
Payer: MEDICARE

## 2025-04-17 ENCOUNTER — OFFICE VISIT (OUTPATIENT)
Dept: MEDICAL GROUP | Facility: PHYSICIAN GROUP | Age: 80
End: 2025-04-17
Payer: MEDICARE

## 2025-04-17 VITALS
DIASTOLIC BLOOD PRESSURE: 80 MMHG | OXYGEN SATURATION: 95 % | TEMPERATURE: 97.2 F | HEART RATE: 83 BPM | HEIGHT: 64 IN | SYSTOLIC BLOOD PRESSURE: 134 MMHG | WEIGHT: 186 LBS | BODY MASS INDEX: 31.76 KG/M2

## 2025-04-17 DIAGNOSIS — E11.29 TYPE 2 DIABETES MELLITUS WITH DIABETIC MICROALBUMINURIA, WITHOUT LONG-TERM CURRENT USE OF INSULIN (HCC): Primary | ICD-10-CM

## 2025-04-17 DIAGNOSIS — R80.9 TYPE 2 DIABETES MELLITUS WITH DIABETIC MICROALBUMINURIA, WITHOUT LONG-TERM CURRENT USE OF INSULIN (HCC): ICD-10-CM

## 2025-04-17 DIAGNOSIS — R80.9 TYPE 2 DIABETES MELLITUS WITH DIABETIC MICROALBUMINURIA, WITHOUT LONG-TERM CURRENT USE OF INSULIN (HCC): Primary | ICD-10-CM

## 2025-04-17 DIAGNOSIS — E66.811 OBESITY (BMI 30.0-34.9): ICD-10-CM

## 2025-04-17 DIAGNOSIS — J30.2 SEASONAL ALLERGIES: ICD-10-CM

## 2025-04-17 DIAGNOSIS — E11.29 TYPE 2 DIABETES MELLITUS WITH DIABETIC MICROALBUMINURIA, WITHOUT LONG-TERM CURRENT USE OF INSULIN (HCC): ICD-10-CM

## 2025-04-17 LAB
HBA1C MFR BLD: 7.8 % (ref ?–5.8)
POCT INT CON NEG: NEGATIVE
POCT INT CON POS: POSITIVE

## 2025-04-17 PROCEDURE — 82570 ASSAY OF URINE CREATININE: CPT

## 2025-04-17 PROCEDURE — 82043 UR ALBUMIN QUANTITATIVE: CPT

## 2025-04-17 PROCEDURE — 3079F DIAST BP 80-89 MM HG: CPT | Performed by: FAMILY MEDICINE

## 2025-04-17 PROCEDURE — 99214 OFFICE O/P EST MOD 30 MIN: CPT | Performed by: FAMILY MEDICINE

## 2025-04-17 PROCEDURE — 3075F SYST BP GE 130 - 139MM HG: CPT | Performed by: FAMILY MEDICINE

## 2025-04-17 PROCEDURE — 83036 HEMOGLOBIN GLYCOSYLATED A1C: CPT | Performed by: FAMILY MEDICINE

## 2025-04-17 ASSESSMENT — PATIENT HEALTH QUESTIONNAIRE - PHQ9: CLINICAL INTERPRETATION OF PHQ2 SCORE: 0

## 2025-04-17 ASSESSMENT — FIBROSIS 4 INDEX: FIB4 SCORE: 2.05

## 2025-04-17 NOTE — LETTER
[facility]  Hamida Hooper M.D.  [address]  [place] [address]  Fax: [phone]   Authorization for Release/Disclosure of   Protected Health Information   Name: YUMIKO BARRIOS : 1945 SSN: [id number]   Address: [address] [place] [address]  [place] [address] Phone:    There are no phone numbers on file.   I authorize the entity listed below to release/disclose the PHI below to:   [facility]/Hamida Hooper M.D. and Hamida Hooper M.D.   Provider or Entity Name:  [facility]   Address   City, State, Zip   Phone:      Fax:     Reason for request: continuity of care   Information to be released:    [  ] LAST COLONOSCOPY,  including any PATH REPORT and follow-up  [  ] LAST FIT/COLOGUARD RESULT [  ] LAST DEXA  [  ] LAST MAMMOGRAM  [  ] LAST PAP  [  ] LAST LABS [XX] RETINA EXAM REPORT  [  ] IMMUNIZATION RECORDS  [  ] Release all info      [  ] Check here and initial the line next to each item to release ALL health information INCLUDING  _____ Care and treatment for drug and / or alcohol abuse  _____ HIV testing, infection status, or AIDS  _____ Genetic Testing    DATES OF SERVICE OR TIME PERIOD TO BE DISCLOSED: _____________  I understand and acknowledge that:  * This Authorization may be revoked at any time by you in writing, except if your health information has already been used or disclosed.  * Your health information that will be used or disclosed as a result of you signing this authorization could be re-disclosed by the recipient. If this occurs, your re-disclosed health information may no longer be protected by State or Federal laws.  * You may refuse to sign this Authorization. Your refusal will not affect your ability to obtain treatment.  * This Authorization becomes effective upon signing and will  on (date) __________.      If no date is indicated, this Authorization will  one (1) year from the signature date.    Name: Yumiko Barrios  Signature: Date:   2025      PLEASE FAX REQUESTED RECORDS BACK TO: [phone]

## 2025-04-17 NOTE — PROGRESS NOTES
Verbal consent was acquired by the patient to use [employer] ambient listening note generation during this visit     Subjective:     HPI:   History of Present Illness  The patient presents for evaluation of diabetes, weight management, and allergies.    Diabetes  - The chief complaint is a lack of control over diabetes, attributed to weight gain.  - She reports an increase in weight and a corresponding rise in her A1c levels from 6.8% in 10/2024 to 7.8% currently.  - Metformin is taken twice daily without any reported side effects.  - Despite efforts to manage her diet, including consuming oatmeal for breakfast and protein-rich meals for lunch and dinner, she has struggled with self-control and has been unable to regulate her eating habits over the past 6 weeks.  - She ceased cooking after her 's death and now dines out approximately 4 to 5 times per month.  - Although she keeps fruits and vegetables at home, she admits to consuming unhealthy foods.    Weight Management  - She has been grappling with weight issues since her 50s, describing it as a constant struggle.  - Various weight management strategies have been attempted, including participation in Weight Watchers programs 3 to 4 times, nutritional counseling, consultation with a dietitian through [facility], and hypnotherapy.  - Despite these efforts, she remains preoccupied with food and fatigued by her weight management efforts.  - No recent episodes of chest pain, respiratory distress, fevers, or chills are reported.    Allergies  - She is currently taking Zyrtec for allergies and inquires about additional treatment options.    Supplemental information: An eye examination was conducted in 08/2024 at [facility], where a retinal exam was also performed.    Health Maintenance: Completed    Objective:     Exam:  /80 (BP Location: Left arm, Patient Position: Sitting, BP Cuff Size: Adult)   Pulse 83   Temp 36.2 °C (97.2 °F) (Temporal)   Ht  "1.626 m (5' 4\")   Wt 84.4 kg (186 lb)   SpO2 95%   BMI 31.93 kg/m²  Body mass index is 31.93 kg/m².    Physical Exam  Constitutional:       Appearance: Normal appearance.   Cardiovascular:      Rate and Rhythm: Normal rate and regular rhythm.      Heart sounds: Normal heart sounds.   Pulmonary:      Effort: Pulmonary effort is normal.      Breath sounds: Normal breath sounds.   Musculoskeletal:      Cervical back: Normal range of motion and neck supple.   Lymphadenopathy:      Cervical: No cervical adenopathy.   Neurological:      Mental Status: She is alert.             Results  Labs   - A1c: 7.8%    Assessment & Plan:     1. Type 2 diabetes mellitus with diabetic microalbuminuria, without long-term current use of insulin ([facility])  POCT Hemoglobin A1C    MICROALBUMIN CREAT RATIO URINE    CBC WITH DIFFERENTIAL    Comp Metabolic Panel    Lipid Profile    CANCELED: POCT Retinal Eye Exam      2. Obesity (BMI 30.0-34.9)        3. Seasonal allergies            Assessment & Plan  1. Diabetes Mellitus: Chronic, uncontrolled. A1c levels have escalated from 6.8% in 10/2024 to 7.8% currently. Weight gain of approximately 9 pounds since 10/2024.  - Increase metformin dosage to 1000 mg bid  - Urine obtained today for microalbumin to creatinine ratio  - Request records from [facility]; if retinal exam was not conducted, schedule for next visit.  - Order blood tests for cholesterol, kidney function, and complete blood count.  - Consider addition of a GLP1 agonist if A1c level remains >7% after 3 months of increased metformin dosage.    2. Obesity: Chronic, progressing. Struggling with weight gain, impacting diabetes control.  - Discuss various weight loss programs like [employer] or Weight Watchers.  - We could consider a GLP1 agonist for weight control    3. Allergies: Chronic.  - Currently taking Zyrtec.  - Add Flonase nasal spray if Zyrtec is not sufficiently effective.  - Use Flonase for at least 4 weeks before " determining its effectiveness.    Return in about 3 months (around 7/17/2025) for f/u DM, get A1c.    Please note that this dictation was created using voice recognition software. I have made every reasonable attempt to correct obvious errors, but I expect that there are errors of grammar and possibly content that I did not discover before finalizing the note.

## 2025-04-18 ENCOUNTER — RESULTS FOLLOW-UP (OUTPATIENT)
Dept: MEDICAL GROUP | Facility: PHYSICIAN GROUP | Age: 80
End: 2025-04-18
Payer: MEDICARE

## 2025-04-18 LAB
CREAT UR-MCNC: 217 MG/DL
MICROALBUMIN UR-MCNC: 8.8 MG/DL
MICROALBUMIN/CREAT UR: 41 MG/G (ref 0–30)

## 2025-05-02 DIAGNOSIS — E03.4 HYPOTHYROIDISM DUE TO ACQUIRED ATROPHY OF THYROID: ICD-10-CM

## 2025-05-05 RX ORDER — LEVOTHYROXINE SODIUM 75 UG/1
75 TABLET ORAL
Qty: 90 TABLET | Refills: 0 | Status: SHIPPED | OUTPATIENT
Start: 2025-05-05

## 2025-05-05 NOTE — TELEPHONE ENCOUNTER
Received request via: Pharmacy    Was the patient seen in the last year in this department? Yes    Does the patient have an active prescription (recently filled or refills available) for medication(s) requested? No    [facility]    Does the patient have long-term Plus and need 100-day supply? (This applies to ALL medications) Patient does not have SCP

## 2025-05-20 DIAGNOSIS — R80.9 TYPE 2 DIABETES MELLITUS WITH MICROALBUMINURIA, WITHOUT LONG-TERM CURRENT USE OF INSULIN (HCC): ICD-10-CM

## 2025-05-20 DIAGNOSIS — E11.29 TYPE 2 DIABETES MELLITUS WITH MICROALBUMINURIA, WITHOUT LONG-TERM CURRENT USE OF INSULIN (HCC): ICD-10-CM

## 2025-05-20 DIAGNOSIS — E78.2 MIXED HYPERLIPIDEMIA: ICD-10-CM

## 2025-05-21 RX ORDER — METFORMIN HYDROCHLORIDE 500 MG/1
1000 TABLET, EXTENDED RELEASE ORAL 2 TIMES DAILY
Qty: 360 TABLET | Refills: 1 | Status: SHIPPED | OUTPATIENT
Start: 2025-05-21

## 2025-05-21 RX ORDER — ATORVASTATIN CALCIUM 40 MG/1
40 TABLET, FILM COATED ORAL DAILY
Qty: 90 TABLET | Refills: 3 | Status: SHIPPED | OUTPATIENT
Start: 2025-05-21

## 2025-05-21 NOTE — TELEPHONE ENCOUNTER
Received request via: Pharmacy    Was the patient seen in the last year in this department? Yes    Does the patient have an active prescription (recently filled or refills available) for medication(s) requested? No    Pharmacy Name: [facility] [address]     Does the patient have long-term Plus and need 100-day supply? (This applies to ALL medications) Patient does not have SCP

## 2025-06-17 ENCOUNTER — OFFICE VISIT (OUTPATIENT)
Dept: URGENT CARE | Facility: CLINIC | Age: 80
End: 2025-06-17
Payer: MEDICARE

## 2025-06-17 VITALS
SYSTOLIC BLOOD PRESSURE: 124 MMHG | BODY MASS INDEX: 30.56 KG/M2 | DIASTOLIC BLOOD PRESSURE: 82 MMHG | OXYGEN SATURATION: 94 % | WEIGHT: 179 LBS | RESPIRATION RATE: 18 BRPM | HEIGHT: 64 IN | HEART RATE: 86 BPM | TEMPERATURE: 97.2 F

## 2025-06-17 DIAGNOSIS — N30.01 ACUTE CYSTITIS WITH HEMATURIA: Primary | ICD-10-CM

## 2025-06-17 LAB
APPEARANCE UR: CLEAR
BILIRUB UR STRIP-MCNC: NEGATIVE MG/DL
COLOR UR AUTO: YELLOW
GLUCOSE UR STRIP.AUTO-MCNC: NEGATIVE MG/DL
KETONES UR STRIP.AUTO-MCNC: NEGATIVE MG/DL
LEUKOCYTE ESTERASE UR QL STRIP.AUTO: NORMAL
NITRITE UR QL STRIP.AUTO: NEGATIVE
PH UR STRIP.AUTO: 5.5 [PH] (ref 5–8)
PROT UR QL STRIP: 100 MG/DL
RBC UR QL AUTO: NORMAL
SP GR UR STRIP.AUTO: 1.02
UROBILINOGEN UR STRIP-MCNC: 0.2 MG/DL

## 2025-06-17 PROCEDURE — 3074F SYST BP LT 130 MM HG: CPT | Performed by: STUDENT IN AN ORGANIZED HEALTH CARE EDUCATION/TRAINING PROGRAM

## 2025-06-17 PROCEDURE — 3079F DIAST BP 80-89 MM HG: CPT | Performed by: STUDENT IN AN ORGANIZED HEALTH CARE EDUCATION/TRAINING PROGRAM

## 2025-06-17 PROCEDURE — 81002 URINALYSIS NONAUTO W/O SCOPE: CPT | Performed by: STUDENT IN AN ORGANIZED HEALTH CARE EDUCATION/TRAINING PROGRAM

## 2025-06-17 PROCEDURE — 99214 OFFICE O/P EST MOD 30 MIN: CPT | Performed by: STUDENT IN AN ORGANIZED HEALTH CARE EDUCATION/TRAINING PROGRAM

## 2025-06-17 RX ORDER — ESTRADIOL 0.1 MG/G
CREAM VAGINAL
Qty: 42.5 G | Refills: 1 | Status: SHIPPED | OUTPATIENT
Start: 2025-06-17

## 2025-06-17 RX ORDER — CEPHALEXIN 500 MG/1
500 CAPSULE ORAL 2 TIMES DAILY
Qty: 10 CAPSULE | Refills: 0 | Status: SHIPPED | OUTPATIENT
Start: 2025-06-17 | End: 2025-06-22

## 2025-06-17 ASSESSMENT — FIBROSIS 4 INDEX: FIB4 SCORE: 2.05

## 2025-06-17 NOTE — PROGRESS NOTES
Subjective:   CHIEF COMPLAINT  Chief Complaint   Patient presents with    UTI     Burning pain , no frequency x 4 days        HPI    History of Present Illness  Kristin Barrios is a 79 y.o. female who presents for evaluation of urinary symptoms.    She has been experiencing dysuria for the past few days, with no associated urinary frequency or urgency. She reports no hematuria but anticipates its occurrence based on past experiences. She also reports no abnormal vaginal discharge, odor, or pruritus. She has not attempted any self-treatment with over-the-counter medications or previous prescriptions. She maintains adequate hydration. She reports no fevers or lower back pain.     She has a history of recurrent urinary tract infections since the age of 20, which have been managed with various treatments. She has no personal history of breast cancer but is aware of her dense breast tissue. She has undergone a hysterectomy.    She has been using vaginal estrogen sporadically and is considering resuming weekly use; not currently taking        PAST SURGICAL HISTORY:  Hysterectomy    FAMILY HISTORY  She has a family history of cancer, including two siblings who had kidney cancer.        REVIEW OF SYSTEMS  General: no fever or chills  GI: no nausea or vomiting  See HPI for further details.    PAST MEDICAL HISTORY  Patient Active Problem List    Diagnosis Date Noted    Obesity (BMI 30.0-34.9) 04/17/2025    History of DVT (deep vein thrombosis) 02/23/2024    Bilateral carpal tunnel syndrome 11/08/2023    Anxiety 01/30/2023    CKD stage G2/A2, GFR 60-89 and albumin creatinine ratio  mg/g 01/11/2023    Change in bowel habits 12/08/2022    Palpitations 04/11/2022    Seasonal allergies 03/25/2022    Overactive bladder 02/17/2022    Eczema 08/23/2021    Recurrent UTI 02/09/2021    Type 2 diabetes mellitus (HCC) 12/01/2020    Essential hypertension 12/01/2020    Mixed hyperlipidemia 12/01/2020    Hypothyroidism due to  "acquired atrophy of thyroid 12/01/2020    Gastroesophageal reflux disease without esophagitis 12/01/2020    Hiatal hernia 11/09/2020    History of basal cell carcinoma (BCC) 04/04/2019    Atherosclerosis of aorta (HCC) 08/25/2015    Diverticulosis of colon 09/09/2013    Sleep apnea 07/03/2012    Tinnitus of both ears 10/03/2006       SURGICAL HISTORY   has a past surgical history that includes lumpectomy (Right); cholecystectomy (1995?); abdominal hysterectomy total (1970?); other; turbt (transurethral resection of bladder tumor) (05/28/2021); cataract extraction with iol (Bilateral, 08/2023); and carpal tunnel release (Right, 4/18/2024).    ALLERGIES  Allergies[1]    CURRENT MEDICATIONS  atorvastatin Tabs  carvedilol Tabs  cephALEXin Caps  cetirizine Tabs  citalopram  estradiol  hydrOXYzine HCl Tabs  levothyroxine Tabs  metFORMIN ER Tb24  omeprazole  prazosin Caps  telmisartan Tabs    SOCIAL HISTORY  Social History     Tobacco Use    Smoking status: Never     Passive exposure: Past    Smokeless tobacco: Never   Vaping Use    Vaping status: Never Used   Substance and Sexual Activity    Alcohol use: Not Currently     Comment: every couple of years 1 drink     Drug use: Never    Sexual activity: Not Currently     Comment:  recently passed        FAMILY HISTORY  Family History   Problem Relation Age of Onset    Cancer Father         Cancer    Cancer Sister         Renal Carcinoma    Cancer Brother         Renal Carcinoma    Cancer Sister         Renal Carcinoma    Diabetes Neg Hx     Ovarian Cancer Neg Hx     Tubal Cancer Neg Hx     Peritoneal Cancer Neg Hx     Colorectal Cancer Neg Hx     Breast Cancer Neg Hx           Objective:   PHYSICAL EXAM  VITAL SIGNS: /82 (BP Location: Left arm, Patient Position: Sitting, BP Cuff Size: Adult)   Pulse 86   Temp 36.2 °C (97.2 °F) (Temporal)   Resp 18   Ht 1.626 m (5' 4\")   Wt 81.2 kg (179 lb)   SpO2 94%   BMI 30.73 kg/m²     Gen: no acute distress, normal " voice  Skin: dry, intact, moist mucosal membranes  Eyes: No conjunctival injection bilaterally.  Neck: Normal range of motion. No meningeal signs.   Lungs: No increased work of breathing.  CTAB w/ symmetric expansion  CV: RRR w/o murmurs or clicks  Psych: normal affect, normal judgement, alert, awake    Assessment/Plan:     1. Acute cystitis with hematuria  cephALEXin (KEFLEX) 500 MG Cap    estradiol (ESTRACE) 0.1 MG/GM vaginal cream    POCT Urinalysis      History and UA consistent with acute cystitis.  There could also be a component of atrophic vaginitis contributing to her symptoms.  She was unable to leave enough urine for culture; will manage empirically with Keflex.  -Ordered Keflex  -Ordered topical estrogen  -2 L H2O daily  -Return to urgent care any new/worsening symptoms or further questions or concerns.  Patient understood everything discussed.  All questions were answered.      1 chronic/recurrent condition with acute exacerbation, ordered prescription medications    Please note that this dictation was created using voice recognition software. I have made a reasonable attempt to correct obvious errors, but I expect that there are errors of grammar and possibly content that I did not discover before finalizing the note.                [1]   Allergies  Allergen Reactions    Lisinopril Cough     Cough 2/6/2008    Warfarin      1998-10-13;BLEEDING    Cefdinir Vomiting and Nausea    Oxycodone Vomiting and Nausea

## 2025-06-24 ENCOUNTER — TELEPHONE (OUTPATIENT)
Dept: MEDICAL GROUP | Facility: PHYSICIAN GROUP | Age: 80
End: 2025-06-24
Payer: MEDICARE

## 2025-06-25 NOTE — TELEPHONE ENCOUNTER
VOICEMAIL  1. Caller Name: Kristin Barrios                        Call Back Number: [phone]   2. Message: Pain in the top of her foot.     3. Patient approves office to leave a detailed voicemail/MyChart message: N\A        Phone Number Called: [phone]     Call outcome: Spoke to patient regarding message below.    Message: Vein in the tops of foot is causing pain and burning since Sunday. Wearing shoes in causing pain as well. Would like to know if she needs to be seen.

## 2025-07-03 ENCOUNTER — OFFICE VISIT (OUTPATIENT)
Dept: URGENT CARE | Facility: CLINIC | Age: 80
End: 2025-07-03
Payer: MEDICARE

## 2025-07-03 ENCOUNTER — HOSPITAL ENCOUNTER (EMERGENCY)
Facility: MEDICAL CENTER | Age: 80
End: 2025-07-03
Attending: EMERGENCY MEDICINE
Payer: MEDICARE

## 2025-07-03 ENCOUNTER — APPOINTMENT (OUTPATIENT)
Dept: RADIOLOGY | Facility: MEDICAL CENTER | Age: 80
End: 2025-07-03
Attending: EMERGENCY MEDICINE
Payer: MEDICARE

## 2025-07-03 VITALS
SYSTOLIC BLOOD PRESSURE: 138 MMHG | DIASTOLIC BLOOD PRESSURE: 82 MMHG | TEMPERATURE: 97.4 F | HEIGHT: 64 IN | OXYGEN SATURATION: 97 % | HEART RATE: 90 BPM | WEIGHT: 180 LBS | RESPIRATION RATE: 20 BRPM | BODY MASS INDEX: 30.73 KG/M2

## 2025-07-03 VITALS
SYSTOLIC BLOOD PRESSURE: 143 MMHG | HEART RATE: 88 BPM | BODY MASS INDEX: 30.04 KG/M2 | WEIGHT: 175 LBS | OXYGEN SATURATION: 94 % | DIASTOLIC BLOOD PRESSURE: 66 MMHG | TEMPERATURE: 97.7 F | RESPIRATION RATE: 16 BRPM

## 2025-07-03 DIAGNOSIS — E83.42 HYPOMAGNESEMIA: ICD-10-CM

## 2025-07-03 DIAGNOSIS — R41.89 BRAIN FOG: Primary | ICD-10-CM

## 2025-07-03 DIAGNOSIS — N39.0 ACUTE UTI: ICD-10-CM

## 2025-07-03 DIAGNOSIS — G44.209 TENSION HEADACHE: Primary | ICD-10-CM

## 2025-07-03 LAB
ALBUMIN SERPL BCP-MCNC: 4.2 G/DL (ref 3.2–4.9)
ALBUMIN/GLOB SERPL: 1.6 G/DL
ALP SERPL-CCNC: 73 U/L (ref 30–99)
ALT SERPL-CCNC: 14 U/L (ref 2–50)
ANION GAP SERPL CALC-SCNC: 15 MMOL/L (ref 7–16)
ANISOCYTOSIS BLD QL SMEAR: ABNORMAL
APPEARANCE UR: CLEAR
AST SERPL-CCNC: 22 U/L (ref 12–45)
BACTERIA #/AREA URNS HPF: ABNORMAL /HPF
BASOPHILS # BLD AUTO: 0.9 % (ref 0–1.8)
BASOPHILS # BLD: 0.04 K/UL (ref 0–0.12)
BILIRUB SERPL-MCNC: 0.7 MG/DL (ref 0.1–1.5)
BILIRUB UR QL STRIP.AUTO: NEGATIVE
BUN SERPL-MCNC: 21 MG/DL (ref 8–22)
CALCIUM ALBUM COR SERPL-MCNC: 8.4 MG/DL (ref 8.5–10.5)
CALCIUM SERPL-MCNC: 8.6 MG/DL (ref 8.5–10.5)
CASTS URNS QL MICRO: ABNORMAL /LPF (ref 0–2)
CHLORIDE SERPL-SCNC: 104 MMOL/L (ref 96–112)
CO2 SERPL-SCNC: 18 MMOL/L (ref 20–33)
COLOR UR: YELLOW
CREAT SERPL-MCNC: 0.96 MG/DL (ref 0.5–1.4)
EOSINOPHIL # BLD AUTO: 0.04 K/UL (ref 0–0.51)
EOSINOPHIL NFR BLD: 0.9 % (ref 0–6.9)
EPITHELIAL CELLS 1715: ABNORMAL /HPF (ref 0–5)
ERYTHROCYTE [DISTWIDTH] IN BLOOD BY AUTOMATED COUNT: 46.2 FL (ref 35.9–50)
GFR SERPLBLD CREATININE-BSD FMLA CKD-EPI: 59 ML/MIN/1.73 M 2
GLOBULIN SER CALC-MCNC: 2.7 G/DL (ref 1.9–3.5)
GLUCOSE BLD-MCNC: 161 MG/DL (ref 65–99)
GLUCOSE SERPL-MCNC: 196 MG/DL (ref 65–99)
GLUCOSE UR STRIP.AUTO-MCNC: NEGATIVE MG/DL
HCT VFR BLD AUTO: 34.5 % (ref 37–47)
HGB BLD-MCNC: 11.2 G/DL (ref 12–16)
KETONES UR STRIP.AUTO-MCNC: ABNORMAL MG/DL
LEUKOCYTE ESTERASE UR QL STRIP.AUTO: ABNORMAL
LYMPHOCYTES # BLD AUTO: 0.99 K/UL (ref 1–4.8)
LYMPHOCYTES NFR BLD: 25.4 % (ref 22–41)
MAGNESIUM SERPL-MCNC: 1.1 MG/DL (ref 1.5–2.5)
MANUAL DIFF BLD: NORMAL
MCH RBC QN AUTO: 29.6 PG (ref 27–33)
MCHC RBC AUTO-ENTMCNC: 32.5 G/DL (ref 32.2–35.5)
MCV RBC AUTO: 91 FL (ref 81.4–97.8)
MICRO URNS: ABNORMAL
MICROCYTES BLD QL SMEAR: ABNORMAL
MONOCYTES # BLD AUTO: 0 K/UL (ref 0–0.85)
MONOCYTES NFR BLD AUTO: 0.9 % (ref 0–13.4)
MORPHOLOGY BLD-IMP: NORMAL
NEUTROPHILS # BLD AUTO: 2.8 K/UL (ref 1.82–7.42)
NEUTROPHILS NFR BLD: 71.9 % (ref 44–72)
NITRITE UR QL STRIP.AUTO: NEGATIVE
NRBC # BLD AUTO: 0 K/UL
NRBC BLD-RTO: 0 /100 WBC (ref 0–0.2)
OVALOCYTES BLD QL SMEAR: NORMAL
PH UR STRIP.AUTO: 5 [PH] (ref 5–8)
PHOSPHATE SERPL-MCNC: 3.3 MG/DL (ref 2.5–4.5)
PLATELET # BLD AUTO: 174 K/UL (ref 164–446)
PLATELET BLD QL SMEAR: NORMAL
PMV BLD AUTO: 9.4 FL (ref 9–12.9)
POIKILOCYTOSIS BLD QL SMEAR: NORMAL
POTASSIUM SERPL-SCNC: 4.1 MMOL/L (ref 3.6–5.5)
PROT SERPL-MCNC: 6.9 G/DL (ref 6–8.2)
PROT UR QL STRIP: NEGATIVE MG/DL
RBC # BLD AUTO: 3.79 M/UL (ref 4.2–5.4)
RBC # URNS HPF: ABNORMAL /HPF (ref 0–2)
RBC BLD AUTO: PRESENT
RBC UR QL AUTO: NEGATIVE
SODIUM SERPL-SCNC: 137 MMOL/L (ref 135–145)
SP GR UR STRIP.AUTO: 1.02
UROBILINOGEN UR STRIP.AUTO-MCNC: 1 EU/DL
WBC # BLD AUTO: 3.9 K/UL (ref 4.8–10.8)
WBC #/AREA URNS HPF: ABNORMAL /HPF

## 2025-07-03 PROCEDURE — 85007 BL SMEAR W/DIFF WBC COUNT: CPT

## 2025-07-03 PROCEDURE — 700102 HCHG RX REV CODE 250 W/ 637 OVERRIDE(OP): Performed by: EMERGENCY MEDICINE

## 2025-07-03 PROCEDURE — 83735 ASSAY OF MAGNESIUM: CPT

## 2025-07-03 PROCEDURE — A9270 NON-COVERED ITEM OR SERVICE: HCPCS | Performed by: EMERGENCY MEDICINE

## 2025-07-03 PROCEDURE — 99214 OFFICE O/P EST MOD 30 MIN: CPT

## 2025-07-03 PROCEDURE — 81001 URINALYSIS AUTO W/SCOPE: CPT

## 2025-07-03 PROCEDURE — 85027 COMPLETE CBC AUTOMATED: CPT

## 2025-07-03 PROCEDURE — 80053 COMPREHEN METABOLIC PANEL: CPT

## 2025-07-03 PROCEDURE — 36415 COLL VENOUS BLD VENIPUNCTURE: CPT

## 2025-07-03 PROCEDURE — 99284 EMERGENCY DEPT VISIT MOD MDM: CPT

## 2025-07-03 PROCEDURE — 70450 CT HEAD/BRAIN W/O DYE: CPT

## 2025-07-03 PROCEDURE — 82962 GLUCOSE BLOOD TEST: CPT

## 2025-07-03 PROCEDURE — 84100 ASSAY OF PHOSPHORUS: CPT

## 2025-07-03 RX ORDER — LANOLIN ALCOHOL/MO/W.PET/CERES
400 CREAM (GRAM) TOPICAL ONCE
Status: COMPLETED | OUTPATIENT
Start: 2025-07-03 | End: 2025-07-03

## 2025-07-03 RX ORDER — LANOLIN ALCOHOL/MO/W.PET/CERES
400 CREAM (GRAM) TOPICAL DAILY
Qty: 30 TABLET | Refills: 0 | Status: SHIPPED | OUTPATIENT
Start: 2025-07-03 | End: 2025-07-17

## 2025-07-03 RX ORDER — ACETAMINOPHEN 500 MG
1000 TABLET ORAL ONCE
Status: COMPLETED | OUTPATIENT
Start: 2025-07-03 | End: 2025-07-03

## 2025-07-03 RX ADMIN — ACETAMINOPHEN 1000 MG: 500 TABLET ORAL at 17:00

## 2025-07-03 RX ADMIN — Medication 400 MG: at 18:56

## 2025-07-03 ASSESSMENT — ENCOUNTER SYMPTOMS
HEADACHES: 1
DIZZINESS: 0
SHORTNESS OF BREATH: 0
FEVER: 0
MUSCULOSKELETAL NEGATIVE: 1
SENSORY CHANGE: 0
WEAKNESS: 0
EYES NEGATIVE: 1
COUGH: 0
DIARRHEA: 1
CHILLS: 0

## 2025-07-03 ASSESSMENT — FIBROSIS 4 INDEX
FIB4 SCORE: 2.05
FIB4 SCORE: 2.05

## 2025-07-03 NOTE — PROGRESS NOTES
"Subjective:     Chief Complaint   Patient presents with   • Headache     Very bad Headache,experiencing bad diarrhea as well,numbness in her fingers about 3 days now feels like she's not in her body.        HPI:  Kristin Barrios is a 79 y.o. female who presents for Reports that she was out to lunch with some friends began to get \"spacey,\" like she was outside of her body, and then she develops a severe headache and visual changes. She reports that this has happened several times over the past few days. Today, she also has some numbness in the pointer finger and middle finger of her left hand. Denies hx of headaches. Speech is clear.  strength is equal and strong.  She is A&Ox4. She has not fallen, she has not hit her head, she is not on a blood thinner. Denies fever, chills. Hx of DM but has been well controlled on her metformin.     ROS:  Review of Systems   Constitutional:  Negative for chills and fever.   HENT: Negative.     Eyes: Negative.    Respiratory:  Negative for cough and shortness of breath.    Cardiovascular:  Negative for chest pain.   Gastrointestinal:  Positive for diarrhea (chronic diarrhea).   Genitourinary: Negative.    Musculoskeletal: Negative.    Skin:  Negative for rash.   Neurological:  Positive for headaches. Negative for dizziness, sensory change (2 finger on left hand) and weakness.   All other systems reviewed and are negative.       CURRENT MEDICATIONS:  Encounter Medications with Dispense Information[1]    ALLERGIES:   Allergies[2]    PROBLEM LIST:    does not have any pertinent problems on file.    Allergies, Medications, & Tobacco/Substance Use were reconciled by the Medical Assistant and reviewed by myself.     Objective:   /82 (BP Location: Left arm, Patient Position: Sitting, BP Cuff Size: Adult)   Pulse 90   Temp 36.3 °C (97.4 °F) (Temporal)   Resp 20   Ht 1.626 m (5' 4\")   Wt 81.6 kg (180 lb)   SpO2 97%   BMI 30.90 kg/m²     Physical Exam  Constitutional:       " General: She is not in acute distress.     Appearance: She is not ill-appearing or toxic-appearing.   Eyes:      General: No visual field deficit.  Cardiovascular:      Rate and Rhythm: Normal rate and regular rhythm.   Pulmonary:      Effort: Pulmonary effort is normal.      Breath sounds: Normal breath sounds.   Abdominal:      General: Abdomen is flat.      Palpations: Abdomen is soft.   Skin:     General: Skin is warm and dry.   Neurological:      Mental Status: She is alert.      GCS: GCS eye subscore is 4. GCS verbal subscore is 5. GCS motor subscore is 6.      Cranial Nerves: Cranial nerves 2-12 are intact. No cranial nerve deficit, dysarthria or facial asymmetry.      Sensory: Sensory deficit (numbness in 2 finger of left hand) present.      Motor: Motor function is intact.      Coordination: Coordination is intact. Coordination normal. Finger-Nose-Finger Test and Heel to Shin Test normal.      Gait: Gait is intact. Gait normal.       Assessment/Plan:   Pt's history and physical exam consistent with abnormal sensation in brain, headache, numbness in her fingers, cause unknown prognosis unknown. FSBG 161 in clinic. Hx of hyperlipemia, HTN, DM2. At this time, I feel the patient requires a higher level of care in the ED for closer monitoring, stat lab work and/or imaging for further evaluation. This has been discussed with the patient and her sister, they state agreement and understanding. The patient would like to go to [facility] ED, report has been provided. EMS called and pt transported to the ER.    Assessment & Plan  Brain fog  Orders:  •  POCT Urinalysis  •  POCT Glucose      Discussed differential diagnosis, management options, risks/benefits, and alternatives to planned treatment. Pt expressed understanding and the treatment plan was agreed upon. Questions were encouraged and answered. Pt encouraged to return to urgent care as needed if new or worsening symptoms or if there is no improvement in condition. Pt  educated in red flags and indications to immediately call 911 or present to the Emergency Department. Advised the patient to follow-up with the primary care physician for recheck, reevaluation, and further management.    I personally reviewed prior external notes and test results pertinent to today's visit. I have independently reviewed and interpreted all diagnostics ordered during this visit.    Please note that this dictation was created using voice recognition software. I have made a reasonable attempt to correct obvious errors, but I expect that there are errors of grammar and possibly content that I did not discover before finalizing the note.    This note was electronically signed by BIRGIT Wilkins           [1]  Current Outpatient Medications   Medication Sig Refill Last Dispense   • atorvastatin (LIPITOR) 40 MG Tab TAKE 1 TABLET BY MOUTH DAILY 3 Unknown (outside pharmacy)   • carvedilol (COREG) 12.5 MG Tab TAKE 1 TABLET BY MOUTH TWICE  DAILY WITH MEALS 3 Unknown (outside pharmacy)   • cetirizine (ZYRTEC) 10 MG Tab Take 10 mg by mouth every day.  Unknown (patient-reported)   • citalopram (CELEXA) 10 MG tablet Take 1 Tablet by mouth every day. 3 Unknown (outside pharmacy)   • estradiol (ESTRACE) 0.1 MG/GM vaginal cream 10 mcg application daily x 2 weeks.  Then apply Monday, Wednesday and Friday indefinitely for maintenance. 1 Unknown (outside pharmacy)   • hydrOXYzine HCl (ATARAX) 25 MG Tab Take 1 Tablet by mouth 3 times a day as needed for Anxiety. (Patient not taking: Reported on 7/3/2025) 0 Unknown (outside pharmacy)   • levothyroxine (SYNTHROID) 75 MCG Tab TAKE 1 TABLET BY MOUTH IN THE  MORNING ON AN EMPTY STOMACH 0 Unknown (outside pharmacy)   • metFORMIN ER (GLUCOPHAGE XR) 500 MG TABLET SR 24 HR Take 2 Tablets by mouth 2 times a day. 1 Unknown (outside pharmacy)   • omeprazole (PRILOSEC) 20 MG delayed-release capsule TAKE 1 CAPSULE BY MOUTH TWICE  DAILY 3 Unknown (outside pharmacy)   • prazosin  (MINIPRESS) 1 MG Cap TAKE 1 CAPSULE BY MOUTH 3 TIMES  DAILY AS NEEDED FOR BLOOD  PRESSURE SPIKES, IF SBP&gt;160 OR  DBP &gt;100 FOR UP TO 90 DAYS (Patient not taking: No sig reported) 0 Unknown (outside pharmacy)   • telmisartan (MICARDIS) 80 MG Tab TAKE 1 TABLET BY MOUTH AT  BEDTIME (Patient not taking: Reported on 7/3/2025) 3 Unknown (outside pharmacy)   [2]  Allergies  Allergen Reactions   • Lisinopril Cough     Cough 2/6/2008   • Warfarin      1998-10-13;BLEEDING   • Cefdinir Vomiting and Nausea   • Oxycodone Vomiting and Nausea

## 2025-07-03 NOTE — ED PROVIDER NOTES
ED Provider Note    CHIEF COMPLAINT  Chief Complaint   Patient presents with    Headache       EXTERNAL RECORDS REVIEWED  Outpatient Notes frequent UTIs    HPI/ROS  LIMITATION TO HISTORY   Select: : None  OUTSIDE HISTORIAN(S):  vimal Barrios is a 79 y.o. female who presents to the emergency department chief complaint of a headache on and off over the last couple of days.  She states that the top of her head is achy in nature and has been coming and going.  At first he thought it was because she was out in the sun for too long and then today something similar happened.  She had a little bit of tingling to her left index finger and third finger but then that got better.  There is been no other unilateral weakness numbness or tingling.  She denies any nausea vomiting fevers or chills.  She does get frequent UTIs so she drinks a lot of water otherwise she has been feeling well.  Headache is still mildly present achy in nature not thunderclap    PAST MEDICAL HISTORY   has a past medical history of Adverse effect of anesthesia, Allergy, Anesthesia, Ankle fracture (2012), Asthma, Blood clotting disorder (HCC), Cancer (HCC), Cataract, Colitis (2020), Dental disorder, Diabetes (HCC), Disorder of thyroid, GERD (gastroesophageal reflux disease), Heart burn, Hemorrhagic disorder (HCC), Hiatus hernia syndrome, High cholesterol, Hypertension, Indigestion, PONV (postoperative nausea and vomiting), Psychiatric problem, Rheumatic fever, Urinary bladder disorder, and Urinary incontinence.    SURGICAL HISTORY   has a past surgical history that includes lumpectomy (Right); cholecystectomy (1995?); abdominal hysterectomy total (1970?); other; turbt (transurethral resection of bladder tumor) (05/28/2021); cataract extraction with iol (Bilateral, 08/2023); and carpal tunnel release (Right, 4/18/2024).    FAMILY HISTORY  Family History   Problem Relation Age of Onset    Cancer Father         Cancer    Cancer Sister         Renal  Carcinoma    Cancer Brother         Renal Carcinoma    Cancer Sister         Renal Carcinoma    Diabetes Neg Hx     Ovarian Cancer Neg Hx     Tubal Cancer Neg Hx     Peritoneal Cancer Neg Hx     Colorectal Cancer Neg Hx     Breast Cancer Neg Hx        SOCIAL HISTORY  Social History     Tobacco Use    Smoking status: Never     Passive exposure: Past    Smokeless tobacco: Never   Vaping Use    Vaping status: Never Used   Substance and Sexual Activity    Alcohol use: Not Currently     Comment: every couple of years 1 drink     Drug use: Never    Sexual activity: Not Currently     Comment:  recently passed        CURRENT MEDICATIONS  Home Medications       Reviewed by Lorie Hassan R.N. (Registered Nurse) on 07/03/25 at 1526  Med List Status: Partial     Medication Last Dose Status   atorvastatin (LIPITOR) 40 MG Tab  Active   carvedilol (COREG) 12.5 MG Tab  Active   cetirizine (ZYRTEC) 10 MG Tab  Active   citalopram (CELEXA) 10 MG tablet  Active   estradiol (ESTRACE) 0.1 MG/GM vaginal cream  Active   levothyroxine (SYNTHROID) 75 MCG Tab  Active   metFORMIN ER (GLUCOPHAGE XR) 500 MG TABLET SR 24 HR  Active   omeprazole (PRILOSEC) 20 MG delayed-release capsule  Active                    ALLERGIES  Allergies[1]    PHYSICAL EXAM  VITAL SIGNS: BP (!) 187/84   Pulse 90   Temp 36.5 °C (97.7 °F) (Temporal)   Resp (!) 11   Wt 79.4 kg (175 lb)   SpO2 94%   BMI 30.04 kg/m²    Pulse OX: Pulse Oxygen level is within normal limits on room air  Constitutional: Alert in no apparent distress.  HENT: Normocephalic, Atraumatic, MMM  Eyes: PERound. Conjunctiva normal, non-icteric.   Heart: Regular rate and rhythm, intact distal pulses   Lungs: Symmetrical movement, no resp distress   Abdomen: Non-tender, non-distended, normal bowel sounds  EXT/Back no edema  Skin: Warm, Dry, No erythema, No rash.   Neurologic: Alert and oriented, Symmetric smile, eyes shut tight bilaterally, forehead wrinkles bilaterally, sensation intact  to light touch bilateral face, tongue midline, head turn and shoulder shrug with full strength. Hearing intact grossly bilaterally. 5/5 strength shoulder, elbow  b/l. 5/5 strength hip, knee, ankle b/l   SILT biceps, forearms, hands, SILT thighs, shins mid foot   NO pronator drift, negative romberg, no aphasia, no dysarthria, no gaze preference or visual deficit         EKG/LABS  Labs Reviewed   CBC WITH DIFFERENTIAL - Abnormal; Notable for the following components:       Result Value    WBC 3.9 (*)     RBC 3.79 (*)     Hemoglobin 11.2 (*)     Hematocrit 34.5 (*)     Lymphs (Absolute) 0.99 (*)     All other components within normal limits   COMP METABOLIC PANEL - Abnormal; Notable for the following components:    Co2 18 (*)     Glucose 196 (*)     Correct Calcium 8.4 (*)     All other components within normal limits   MAGNESIUM - Abnormal; Notable for the following components:    Magnesium 1.1 (*)     All other components within normal limits   URINALYSIS,CULTURE IF INDICATED - Abnormal; Notable for the following components:    Ketones Trace (*)     Leukocyte Esterase Trace (*)     All other components within normal limits   ESTIMATED GFR - Abnormal; Notable for the following components:    GFR (CKD-EPI) 59 (*)     All other components within normal limits   URINE MICROSCOPIC (W/UA) - Abnormal; Notable for the following components:    Bacteria Few (*)     Epithelial Cells 6-10 (*)     Urine Casts 3-5 (*)     All other components within normal limits   PHOSPHORUS   DIFFERENTIAL MANUAL   PERIPHERAL SMEAR REVIEW   PLATELET ESTIMATE   MORPHOLOGY       I have independently interpreted this EKG    RADIOLOGY/PROCEDURES   I have independently interpreted the diagnostic imaging associated with this visit and am waiting the final reading from the radiologist.   My preliminary interpretation is as follows:     CT head consistent with atrophy without fracture or bleed or mass    Radiologist interpretation:  CT-HEAD W/O    Final Result      1.  Cerebral atrophy.      2.  White matter lucencies most consistent with small vessel ischemic change versus demyelination or gliosis.      3.  Otherwise, Head CT without contrast with no acute findings. No evidence of acute cerebral infarction, hemorrhage or mass lesion.                   COURSE & MEDICAL DECISION MAKING    ASSESSMENT, COURSE AND PLAN  Care Narrative:     Patient is an 79-year-old female who presents to the Emergency Department with this headache on and off for the last few days she does have frequent UTIs but no other signs of neurologic insufficiencies or damage neurologically she is intact NIH is 0 no chest pain or shortness of breath will evaluate for electrolyte abnormalities may receive exhaustion dehydration or infectious process.  She will be treated with Tylenol here in the emergency department      DISPOSITION AND DISCUSSIONS  7:05 PM  Reassessed patient at bedside.  She was low on magnesium urinalysis has trace leuks and few bacteria but she states this is usually how her starts even though it is contaminated.  She states usually starts with other vague symptoms and is requesting antibiotics.  She does have a history of E. coli that is been resistant to multiple medications and she does have some allergy so she was at home on Augmentin after discussion with pharmacy.  She was little hyperglycemic but not signs of DKA or HHS.  She feels better after the Tylenol and the magnesium to be sent home on magnesium and given return precautions.      I have discussed management of the patient with the following physicians and MJ's:  none    Discussion of management with other QHP or appropriate source(s): None     Escalation of care considered, and ultimately not performed:acute inpatient care management, however at this time, the patient is most appropriate for outpatient management    Barriers to care at this time, including but not limited to: na.     Decision tools and  prescription drugs considered including, but not limited to: NIH 0.    The patient will return for new or worsening symptoms and is stable at the time of discharge.    The patient is referred to a primary physician for blood pressure management, diabetic screening, and for all other preventative health concerns.    DISPOSITION:  Patient will be discharged home in stable condition.    FOLLOW UP:  Hamida Hooper M.D.  [address]  [facility] [id number] [phone]    Schedule an appointment as soon as possible for a visit       [facility], Emergency Dept  [address] [facility] [address] [phone]    If symptoms worsen      OUTPATIENT MEDICATIONS:  Discharge Medication List as of 7/3/2025  7:25 PM        START taking these medications    Details   amoxicillin-clavulanate (AUGMENTIN) 875-125 MG Tab Take 1 Tablet by mouth 2 times a day for 7 days., Disp-14 Tablet, R-0, Normal      magnesium oxide 400 (240 Mg) MG Tab Take 1 Tablet by mouth every day., Disp-30 Tablet, R-0, Normal               FINAL DIAGNOSIS  1. Tension headache    2. Hypomagnesemia    3. Acute UTI         Electronically signed by: Joy Grimes M.D., 7/3/2025 4:02 PM           [1]   Allergies  Allergen Reactions    Lisinopril Cough     Cough 2/6/2008    Warfarin      1998-10-13;BLEEDING    Cefdinir Vomiting and Nausea    Oxycodone Vomiting and Nausea

## 2025-07-03 NOTE — ED TRIAGE NOTES
Pt bib ems from urgent care c/o HA and left finger numbness that has been coming and going x last week. Pt states no longer having finger numbness but still has HA. Pt states also has chronic diarrhea and hx of diverticulitis. Nad.

## 2025-07-04 DIAGNOSIS — K21.9 GASTROESOPHAGEAL REFLUX DISEASE WITHOUT ESOPHAGITIS: ICD-10-CM

## 2025-07-04 NOTE — ED NOTES
Patient discharged home per ERP.  Discharge teaching and education discussed with patient. POC discussed.   Patient verbalized understanding of discharge teaching and education. No other questions at this time.     RX sent to pt's pharmacy.   PIV removed.     VSS. Patient alert and oriented. Patient arranged ride for self. Able to ambulate off unit safely with steady gait.

## 2025-07-07 ENCOUNTER — OFFICE VISIT (OUTPATIENT)
Dept: MEDICAL GROUP | Facility: PHYSICIAN GROUP | Age: 80
End: 2025-07-07
Payer: MEDICARE

## 2025-07-07 VITALS
OXYGEN SATURATION: 96 % | BODY MASS INDEX: 31 KG/M2 | HEART RATE: 89 BPM | HEIGHT: 64 IN | SYSTOLIC BLOOD PRESSURE: 140 MMHG | TEMPERATURE: 97.7 F | DIASTOLIC BLOOD PRESSURE: 78 MMHG | WEIGHT: 181.6 LBS

## 2025-07-07 DIAGNOSIS — R51.9 ACUTE NONINTRACTABLE HEADACHE, UNSPECIFIED HEADACHE TYPE: Primary | ICD-10-CM

## 2025-07-07 DIAGNOSIS — E83.42 HYPOMAGNESEMIA: ICD-10-CM

## 2025-07-07 PROCEDURE — 3077F SYST BP >= 140 MM HG: CPT | Performed by: FAMILY MEDICINE

## 2025-07-07 PROCEDURE — 3078F DIAST BP <80 MM HG: CPT | Performed by: FAMILY MEDICINE

## 2025-07-07 PROCEDURE — 99213 OFFICE O/P EST LOW 20 MIN: CPT | Performed by: FAMILY MEDICINE

## 2025-07-07 RX ORDER — OMEPRAZOLE 20 MG/1
20 CAPSULE, DELAYED RELEASE ORAL 2 TIMES DAILY
Qty: 180 CAPSULE | Refills: 3 | Status: SHIPPED | OUTPATIENT
Start: 2025-07-07

## 2025-07-07 ASSESSMENT — FIBROSIS 4 INDEX: FIB4 SCORE: 2.74

## 2025-07-07 NOTE — TELEPHONE ENCOUNTER
Received request via: Patient    Was the patient seen in the last year in this department? Yes    Does the patient have an active prescription (recently filled or refills available) for medication(s) requested? No    Pharmacy Name: [facility] - [address]     Does the patient have custodial Plus and need 100-day supply? (This applies to ALL medications) Patient does not have SCP

## 2025-07-17 ENCOUNTER — OFFICE VISIT (OUTPATIENT)
Dept: MEDICAL GROUP | Facility: PHYSICIAN GROUP | Age: 80
End: 2025-07-17
Payer: MEDICARE

## 2025-07-17 VITALS
HEIGHT: 64 IN | HEART RATE: 89 BPM | DIASTOLIC BLOOD PRESSURE: 74 MMHG | TEMPERATURE: 98.3 F | OXYGEN SATURATION: 96 % | SYSTOLIC BLOOD PRESSURE: 132 MMHG | BODY MASS INDEX: 30.46 KG/M2 | WEIGHT: 178.4 LBS

## 2025-07-17 DIAGNOSIS — E11.29 TYPE 2 DIABETES MELLITUS WITH DIABETIC MICROALBUMINURIA, WITHOUT LONG-TERM CURRENT USE OF INSULIN (HCC): Primary | ICD-10-CM

## 2025-07-17 DIAGNOSIS — L30.8 OTHER ECZEMA: ICD-10-CM

## 2025-07-17 DIAGNOSIS — R80.9 TYPE 2 DIABETES MELLITUS WITH DIABETIC MICROALBUMINURIA, WITHOUT LONG-TERM CURRENT USE OF INSULIN (HCC): Primary | ICD-10-CM

## 2025-07-17 DIAGNOSIS — G43.009 MIGRAINE WITHOUT AURA AND WITHOUT STATUS MIGRAINOSUS, NOT INTRACTABLE: ICD-10-CM

## 2025-07-17 LAB
HBA1C MFR BLD: 7.2 % (ref ?–5.8)
POCT INT CON NEG: NEGATIVE
POCT INT CON POS: POSITIVE

## 2025-07-17 PROCEDURE — 3078F DIAST BP <80 MM HG: CPT | Performed by: FAMILY MEDICINE

## 2025-07-17 PROCEDURE — 99214 OFFICE O/P EST MOD 30 MIN: CPT | Performed by: FAMILY MEDICINE

## 2025-07-17 PROCEDURE — 3075F SYST BP GE 130 - 139MM HG: CPT | Performed by: FAMILY MEDICINE

## 2025-07-17 PROCEDURE — 83036 HEMOGLOBIN GLYCOSYLATED A1C: CPT | Performed by: FAMILY MEDICINE

## 2025-07-17 RX ORDER — LANOLIN ALCOHOL/MO/W.PET/CERES
600 CREAM (GRAM) TOPICAL DAILY
Qty: 45 TABLET | Refills: 0 | Status: CANCELLED | OUTPATIENT
Start: 2025-07-17

## 2025-07-17 RX ORDER — MAGNESIUM 200 MG
600 TABLET ORAL DAILY
COMMUNITY

## 2025-07-17 RX ORDER — DULAGLUTIDE 0.75 MG/.5ML
0.75 INJECTION, SOLUTION SUBCUTANEOUS
Qty: 2 ML | Refills: 0 | Status: SHIPPED | OUTPATIENT
Start: 2025-07-17

## 2025-07-17 RX ORDER — TACROLIMUS 1 MG/G
OINTMENT TOPICAL DAILY
COMMUNITY
Start: 2025-04-21

## 2025-07-17 ASSESSMENT — FIBROSIS 4 INDEX: FIB4 SCORE: 2.74

## 2025-07-17 NOTE — PROGRESS NOTES
Verbal consent was acquired by the patient to use [employer] ambient listening note generation during this visit     Subjective:     HPI:   History of Present Illness  The patient presents for evaluation of type 2 diabetes, eczema, migraine without aura, and hot flashes.    Type 2 Diabetes  - She has been managing her diabetes with metformin, taking two 1000 mg tablets twice daily.  - Despite this regimen, her A1c has come down to 7.2%, closer to the goal but still not quite there.  - She expresses concern about her blood sugar levels and has noticed a decrease in her weight since April 2025.    Migraine without Aura  - She recently experienced another headache, which was not severe enough to impair her reading ability.  - She has since stopped drinking coffee and switched to [employer] Grey tea, which contains caffeine.  - She is curious about the potential link between caffeine and migraines.  - She used to consume tea heavily before switching to coffee.  - She found relief from her headache after taking Tylenol.    Hot Flashes  - She underwent menopause early due to a hysterectomy at age 29.  - Recently, she has been experiencing symptoms similar to those she had during menopause, including excessive sweating on her head.    Eczema  - She is using tacrolimus ointment once a day for eczema.  - She is not applying it on her eyes or stomach currently as she has been working.  - The condition comes and goes.    She reports no recent chest pain or breathing difficulties.    However, she did experience a severe coughing episode with significant phlegm production about 5 to 6 days ago, which made it difficult for her to breathe.  - This episode was accompanied by persistent coughing, but no fevers or chills.  - She believes these symptoms were allergy-related and is seeking advice on how to manage the cough.    Social History:  - Coffee/Tea/Caffeine-containing Drinks: She drinks [employer] Grey tea, which contains  "caffeine.    PAST SURGICAL HISTORY:  - Hysterectomy at age 29    FAMILY HISTORY  - Sister has severe headaches  - Mother had ocular migraines    Health Maintenance: Completed    Objective:     Exam:  /74 (BP Location: Left arm, Patient Position: Sitting, BP Cuff Size: Adult)   Pulse 89   Temp 36.8 °C (98.3 °F) (Temporal)   Ht 1.626 m (5' 4\")   Wt 80.9 kg (178 lb 6.4 oz)   SpO2 96%   BMI 30.62 kg/m²  Body mass index is 30.62 kg/m².    Physical Exam  Constitutional:       Appearance: Normal appearance.   Cardiovascular:      Rate and Rhythm: Normal rate and regular rhythm.      Heart sounds: Normal heart sounds.   Pulmonary:      Effort: Pulmonary effort is normal.      Breath sounds: Normal breath sounds.   Musculoskeletal:      Cervical back: Normal range of motion and neck supple.   Lymphadenopathy:      Cervical: No cervical adenopathy.   Neurological:      Mental Status: She is alert.             Results  Labs   - A1c: 7.2%    Assessment & Plan:     1. Type 2 diabetes mellitus with diabetic microalbuminuria, without long-term current use of insulin ([facility])  POCT A1C    Dulaglutide (TRULICITY) 0.75 MG/0.5ML Solution Auto-injector      2. Other eczema        3. Migraine without aura and without status migrainosus, not intractable            Assessment & Plan  1. Type 2 diabetes: Chronic. A1c level has decreased to 7.2%, indicating an improvement in diabetes management. Weight has decreased from 186 lbs in April to current weight, suggesting positive effects from metformin.  - Clinical decision making: Discussion about adding an DPP-4 inhibitor or Trulicity injection for better glycemic control and kidney protection.  - Treatment plan: Prescription for Trulicity 0.75 mg injections once weekly sent to pharmacy, pending prior authorization and affordability; alternative DPP-4 inhibitor will be prescribed if needed. Continue metformin ER 1000 mg bid.  If we are unable to prescribe Trulicity, we will have to " start an ACE inhibitor for renal protection due to her microalbuminuria.    2. Eczema: Eczema is currently stable and well-managed.  - Treatment plan: Daily application of tacrolimus ointment as prescribed by dermatologist. Confirmation of medication use and effectiveness. Continued treatment regimen and follow-up with dermatologist as scheduled.    3. Migraine without aura: Headaches meet diagnostic criteria for migraines.  - Treatment plan: Tylenol has been effective for headache relief. Discussion about potential triggers and maintaining a headache journal. Monitoring for any changes or patterns in headache occurrence.      Return in about 3 months (around 10/17/2025) for f/u DM, get A1c.    Please note that this dictation was created using voice recognition software. I have made every reasonable attempt to correct obvious errors, but I expect that there are errors of grammar and possibly content that I did not discover before finalizing the note.

## 2025-07-29 DIAGNOSIS — E03.4 HYPOTHYROIDISM DUE TO ACQUIRED ATROPHY OF THYROID: Primary | ICD-10-CM

## 2025-07-31 ENCOUNTER — RESULTS FOLLOW-UP (OUTPATIENT)
Dept: MEDICAL GROUP | Facility: PHYSICIAN GROUP | Age: 80
End: 2025-07-31

## 2025-07-31 ENCOUNTER — HOSPITAL ENCOUNTER (OUTPATIENT)
Dept: LAB | Facility: MEDICAL CENTER | Age: 80
End: 2025-07-31
Attending: FAMILY MEDICINE
Payer: MEDICARE

## 2025-08-18 ENCOUNTER — TELEPHONE (OUTPATIENT)
Dept: MEDICAL GROUP | Facility: PHYSICIAN GROUP | Age: 80
End: 2025-08-18
Payer: MEDICARE

## 2025-08-19 ENCOUNTER — TELEPHONE (OUTPATIENT)
Dept: MEDICAL GROUP | Facility: PHYSICIAN GROUP | Age: 80
End: 2025-08-19
Payer: MEDICARE

## 2025-08-19 ENCOUNTER — APPOINTMENT (OUTPATIENT)
Dept: RADIOLOGY | Facility: MEDICAL CENTER | Age: 80
End: 2025-08-19
Attending: EMERGENCY MEDICINE
Payer: MEDICARE

## 2025-08-19 ENCOUNTER — HOSPITAL ENCOUNTER (OUTPATIENT)
Facility: MEDICAL CENTER | Age: 80
End: 2025-08-21
Attending: EMERGENCY MEDICINE | Admitting: HOSPITALIST
Payer: MEDICARE

## 2025-08-19 DIAGNOSIS — R20.2 PARESTHESIA: Primary | ICD-10-CM

## 2025-08-19 DIAGNOSIS — R20.0 NUMBNESS: ICD-10-CM

## 2025-08-19 LAB
ABO + RH BLD: NORMAL
ABO GROUP BLD: NORMAL
ALBUMIN SERPL BCP-MCNC: 4.2 G/DL (ref 3.2–4.9)
ALBUMIN/GLOB SERPL: 1.6 G/DL
ALP SERPL-CCNC: 81 U/L (ref 30–99)
ALT SERPL-CCNC: 13 U/L (ref 2–50)
ANION GAP SERPL CALC-SCNC: 15 MMOL/L (ref 7–16)
ANISOCYTOSIS BLD QL SMEAR: ABNORMAL
APTT PPP: 35.4 SEC (ref 24.7–36)
AST SERPL-CCNC: 20 U/L (ref 12–45)
BASOPHILS # BLD AUTO: 0.9 % (ref 0–1.8)
BASOPHILS # BLD: 0.04 K/UL (ref 0–0.12)
BILIRUB SERPL-MCNC: 1.1 MG/DL (ref 0.1–1.5)
BLD GP AB SCN SERPL QL: NORMAL
BUN SERPL-MCNC: 17 MG/DL (ref 8–22)
CALCIUM ALBUM COR SERPL-MCNC: 8.8 MG/DL (ref 8.5–10.5)
CALCIUM SERPL-MCNC: 9 MG/DL (ref 8.5–10.5)
CHLORIDE SERPL-SCNC: 102 MMOL/L (ref 96–112)
CO2 SERPL-SCNC: 19 MMOL/L (ref 20–33)
CREAT SERPL-MCNC: 0.89 MG/DL (ref 0.5–1.4)
EKG IMPRESSION: NORMAL
EOSINOPHIL # BLD AUTO: 0 K/UL (ref 0–0.51)
EOSINOPHIL NFR BLD: 0 % (ref 0–6.9)
ERYTHROCYTE [DISTWIDTH] IN BLOOD BY AUTOMATED COUNT: 44 FL (ref 35.9–50)
EST. AVERAGE GLUCOSE BLD GHB EST-MCNC: 157 MG/DL
GFR SERPLBLD CREATININE-BSD FMLA CKD-EPI: 65 ML/MIN/1.73 M 2
GLOBULIN SER CALC-MCNC: 2.6 G/DL (ref 1.9–3.5)
GLUCOSE BLD STRIP.AUTO-MCNC: 143 MG/DL (ref 65–99)
GLUCOSE SERPL-MCNC: 109 MG/DL (ref 65–99)
HBA1C MFR BLD: 7.1 % (ref 4–5.6)
HCT VFR BLD AUTO: 32 % (ref 37–47)
HGB BLD-MCNC: 10.7 G/DL (ref 12–16)
INR PPP: 1.1 (ref 0.87–1.13)
LYMPHOCYTES # BLD AUTO: 1.37 K/UL (ref 1–4.8)
LYMPHOCYTES NFR BLD: 35.1 % (ref 22–41)
MANUAL DIFF BLD: NORMAL
MCH RBC QN AUTO: 29 PG (ref 27–33)
MCHC RBC AUTO-ENTMCNC: 33.4 G/DL (ref 32.2–35.5)
MCV RBC AUTO: 86.7 FL (ref 81.4–97.8)
MICROCYTES BLD QL SMEAR: ABNORMAL
MONOCYTES # BLD AUTO: 0 K/UL (ref 0–0.85)
MONOCYTES NFR BLD AUTO: 0.9 % (ref 0–13.4)
MORPHOLOGY BLD-IMP: NORMAL
NEUTROPHILS # BLD AUTO: 2.46 K/UL (ref 1.82–7.42)
NEUTROPHILS NFR BLD: 63.1 % (ref 44–72)
NRBC # BLD AUTO: 0 K/UL
NRBC BLD-RTO: 0 /100 WBC (ref 0–0.2)
OVALOCYTES BLD QL SMEAR: NORMAL
PLATELET # BLD AUTO: 189 K/UL (ref 164–446)
PLATELET BLD QL SMEAR: NORMAL
PMV BLD AUTO: 9.2 FL (ref 9–12.9)
POIKILOCYTOSIS BLD QL SMEAR: NORMAL
POTASSIUM SERPL-SCNC: 4.6 MMOL/L (ref 3.6–5.5)
PROT SERPL-MCNC: 6.8 G/DL (ref 6–8.2)
PROTHROMBIN TIME: 14.4 SEC (ref 12–14.6)
RBC # BLD AUTO: 3.69 M/UL (ref 4.2–5.4)
RBC BLD AUTO: PRESENT
RH BLD: NORMAL
SODIUM SERPL-SCNC: 136 MMOL/L (ref 135–145)
TROPONIN T SERPL-MCNC: 10 NG/L (ref 6–19)
WBC # BLD AUTO: 3.9 K/UL (ref 4.8–10.8)

## 2025-08-19 PROCEDURE — 86850 RBC ANTIBODY SCREEN: CPT

## 2025-08-19 PROCEDURE — 86901 BLOOD TYPING SEROLOGIC RH(D): CPT

## 2025-08-19 PROCEDURE — 85730 THROMBOPLASTIN TIME PARTIAL: CPT

## 2025-08-19 PROCEDURE — G0378 HOSPITAL OBSERVATION PER HR: HCPCS

## 2025-08-19 PROCEDURE — 36415 COLL VENOUS BLD VENIPUNCTURE: CPT

## 2025-08-19 PROCEDURE — 84484 ASSAY OF TROPONIN QUANT: CPT

## 2025-08-19 PROCEDURE — 80053 COMPREHEN METABOLIC PANEL: CPT

## 2025-08-19 PROCEDURE — 85027 COMPLETE CBC AUTOMATED: CPT

## 2025-08-19 PROCEDURE — 93005 ELECTROCARDIOGRAM TRACING: CPT | Mod: TC | Performed by: EMERGENCY MEDICINE

## 2025-08-19 PROCEDURE — 96375 TX/PRO/DX INJ NEW DRUG ADDON: CPT

## 2025-08-19 PROCEDURE — 85610 PROTHROMBIN TIME: CPT

## 2025-08-19 PROCEDURE — 82962 GLUCOSE BLOOD TEST: CPT | Performed by: HOSPITALIST

## 2025-08-19 PROCEDURE — 700111 HCHG RX REV CODE 636 W/ 250 OVERRIDE (IP): Performed by: HOSPITALIST

## 2025-08-19 PROCEDURE — A9270 NON-COVERED ITEM OR SERVICE: HCPCS | Performed by: HOSPITALIST

## 2025-08-19 PROCEDURE — 700102 HCHG RX REV CODE 250 W/ 637 OVERRIDE(OP): Performed by: HOSPITALIST

## 2025-08-19 PROCEDURE — 99285 EMERGENCY DEPT VISIT HI MDM: CPT

## 2025-08-19 PROCEDURE — 70450 CT HEAD/BRAIN W/O DYE: CPT

## 2025-08-19 PROCEDURE — 96374 THER/PROPH/DIAG INJ IV PUSH: CPT

## 2025-08-19 PROCEDURE — 83036 HEMOGLOBIN GLYCOSYLATED A1C: CPT

## 2025-08-19 PROCEDURE — 85007 BL SMEAR W/DIFF WBC COUNT: CPT

## 2025-08-19 PROCEDURE — 86900 BLOOD TYPING SEROLOGIC ABO: CPT | Mod: 91

## 2025-08-19 PROCEDURE — 71045 X-RAY EXAM CHEST 1 VIEW: CPT

## 2025-08-19 RX ORDER — CARVEDILOL 12.5 MG/1
12.5 TABLET ORAL 2 TIMES DAILY WITH MEALS
Status: DISCONTINUED | OUTPATIENT
Start: 2025-08-19 | End: 2025-08-20

## 2025-08-19 RX ORDER — DIAZEPAM 10 MG/2ML
2.5 INJECTION, SOLUTION INTRAMUSCULAR; INTRAVENOUS
Status: COMPLETED | OUTPATIENT
Start: 2025-08-19 | End: 2025-08-20

## 2025-08-19 RX ORDER — ASPIRIN 300 MG/1
300 SUPPOSITORY RECTAL DAILY
Status: DISCONTINUED | OUTPATIENT
Start: 2025-08-20 | End: 2025-08-21 | Stop reason: HOSPADM

## 2025-08-19 RX ORDER — OMEPRAZOLE 20 MG/1
20 CAPSULE, DELAYED RELEASE ORAL 2 TIMES DAILY
Status: DISCONTINUED | OUTPATIENT
Start: 2025-08-19 | End: 2025-08-21 | Stop reason: HOSPADM

## 2025-08-19 RX ORDER — LEVOTHYROXINE SODIUM 75 UG/1
75 TABLET ORAL
Status: DISCONTINUED | OUTPATIENT
Start: 2025-08-20 | End: 2025-08-21 | Stop reason: HOSPADM

## 2025-08-19 RX ORDER — INSULIN LISPRO 100 [IU]/ML
1-6 INJECTION, SOLUTION INTRAVENOUS; SUBCUTANEOUS
Status: DISCONTINUED | OUTPATIENT
Start: 2025-08-19 | End: 2025-08-21 | Stop reason: HOSPADM

## 2025-08-19 RX ORDER — CITALOPRAM HYDROBROMIDE 20 MG/1
10 TABLET ORAL DAILY
Status: DISCONTINUED | OUTPATIENT
Start: 2025-08-20 | End: 2025-08-21 | Stop reason: HOSPADM

## 2025-08-19 RX ORDER — DEXTROSE MONOHYDRATE 25 G/50ML
25 INJECTION, SOLUTION INTRAVENOUS
Status: DISCONTINUED | OUTPATIENT
Start: 2025-08-19 | End: 2025-08-21 | Stop reason: HOSPADM

## 2025-08-19 RX ORDER — ENOXAPARIN SODIUM 100 MG/ML
40 INJECTION SUBCUTANEOUS DAILY
Status: DISCONTINUED | OUTPATIENT
Start: 2025-08-19 | End: 2025-08-21 | Stop reason: HOSPADM

## 2025-08-19 RX ORDER — POLYETHYLENE GLYCOL 3350 17 G/17G
1 POWDER, FOR SOLUTION ORAL
Status: DISCONTINUED | OUTPATIENT
Start: 2025-08-19 | End: 2025-08-21 | Stop reason: HOSPADM

## 2025-08-19 RX ORDER — ATORVASTATIN CALCIUM 40 MG/1
40 TABLET, FILM COATED ORAL DAILY
Status: DISCONTINUED | OUTPATIENT
Start: 2025-08-20 | End: 2025-08-21 | Stop reason: HOSPADM

## 2025-08-19 RX ORDER — LABETALOL HYDROCHLORIDE 5 MG/ML
20 INJECTION, SOLUTION INTRAVENOUS ONCE
Status: COMPLETED | OUTPATIENT
Start: 2025-08-19 | End: 2025-08-19

## 2025-08-19 RX ORDER — CETIRIZINE HYDROCHLORIDE 10 MG/1
10 TABLET ORAL NIGHTLY
Status: DISCONTINUED | OUTPATIENT
Start: 2025-08-19 | End: 2025-08-21 | Stop reason: HOSPADM

## 2025-08-19 RX ORDER — ONDANSETRON 4 MG/1
4 TABLET, ORALLY DISINTEGRATING ORAL EVERY 4 HOURS PRN
Status: DISCONTINUED | OUTPATIENT
Start: 2025-08-19 | End: 2025-08-21 | Stop reason: HOSPADM

## 2025-08-19 RX ORDER — ASPIRIN 81 MG/1
81 TABLET, CHEWABLE ORAL DAILY
Status: DISCONTINUED | OUTPATIENT
Start: 2025-08-20 | End: 2025-08-21 | Stop reason: HOSPADM

## 2025-08-19 RX ORDER — AMOXICILLIN 250 MG
2 CAPSULE ORAL EVERY EVENING
Status: DISCONTINUED | OUTPATIENT
Start: 2025-08-19 | End: 2025-08-21 | Stop reason: HOSPADM

## 2025-08-19 RX ORDER — ACETAMINOPHEN 325 MG/1
650 TABLET ORAL EVERY 6 HOURS PRN
Status: DISCONTINUED | OUTPATIENT
Start: 2025-08-19 | End: 2025-08-21 | Stop reason: HOSPADM

## 2025-08-19 RX ORDER — ONDANSETRON 2 MG/ML
4 INJECTION INTRAMUSCULAR; INTRAVENOUS EVERY 4 HOURS PRN
Status: DISCONTINUED | OUTPATIENT
Start: 2025-08-19 | End: 2025-08-21 | Stop reason: HOSPADM

## 2025-08-19 RX ADMIN — LABETALOL HYDROCHLORIDE 20 MG: 5 INJECTION INTRAVENOUS at 20:00

## 2025-08-19 RX ADMIN — OMEPRAZOLE 20 MG: 20 CAPSULE, DELAYED RELEASE ORAL at 21:44

## 2025-08-19 RX ADMIN — CETIRIZINE HYDROCHLORIDE 10 MG: 10 TABLET, FILM COATED ORAL at 21:44

## 2025-08-19 RX ADMIN — CARVEDILOL 12.5 MG: 12.5 TABLET, FILM COATED ORAL at 21:44

## 2025-08-19 ASSESSMENT — ENCOUNTER SYMPTOMS
SHORTNESS OF BREATH: 0
SENSORY CHANGE: 1
FEVER: 0
MYALGIAS: 0
BRUISES/BLEEDS EASILY: 0
COUGH: 0
STRIDOR: 0
ABDOMINAL PAIN: 0
FOCAL WEAKNESS: 0
FLANK PAIN: 0
EYE REDNESS: 0
DIZZINESS: 1
VOMITING: 0
EYE DISCHARGE: 0
NERVOUS/ANXIOUS: 1
CHILLS: 0

## 2025-08-19 ASSESSMENT — COGNITIVE AND FUNCTIONAL STATUS - GENERAL
SUGGESTED CMS G CODE MODIFIER DAILY ACTIVITY: CH
SUGGESTED CMS G CODE MODIFIER MOBILITY: CH
MOBILITY SCORE: 24
DAILY ACTIVITIY SCORE: 24

## 2025-08-19 ASSESSMENT — LIFESTYLE VARIABLES
ON A TYPICAL DAY WHEN YOU DRINK ALCOHOL HOW MANY DRINKS DO YOU HAVE: 0
EVER HAD A DRINK FIRST THING IN THE MORNING TO STEADY YOUR NERVES TO GET RID OF A HANGOVER: NO
TOTAL SCORE: 0
HOW MANY TIMES IN THE PAST YEAR HAVE YOU HAD 5 OR MORE DRINKS IN A DAY: 0
ALCOHOL_USE: NO
HAVE PEOPLE ANNOYED YOU BY CRITICIZING YOUR DRINKING: NO
AVERAGE NUMBER OF DAYS PER WEEK YOU HAVE A DRINK CONTAINING ALCOHOL: 0
HAVE YOU EVER FELT YOU SHOULD CUT DOWN ON YOUR DRINKING: NO
CONSUMPTION TOTAL: NEGATIVE
TOTAL SCORE: 0
TOTAL SCORE: 0
DOES PATIENT WANT TO STOP DRINKING: NO
EVER FELT BAD OR GUILTY ABOUT YOUR DRINKING: NO

## 2025-08-19 ASSESSMENT — PAIN DESCRIPTION - PAIN TYPE: TYPE: ACUTE PAIN

## 2025-08-19 ASSESSMENT — FIBROSIS 4 INDEX
FIB4 SCORE: 2.38
FIB4 SCORE: 2.28

## 2025-08-20 ENCOUNTER — APPOINTMENT (OUTPATIENT)
Dept: RADIOLOGY | Facility: MEDICAL CENTER | Age: 80
End: 2025-08-20
Attending: HOSPITALIST
Payer: MEDICARE

## 2025-08-20 LAB
ALBUMIN SERPL BCP-MCNC: 4.1 G/DL (ref 3.2–4.9)
ALBUMIN/GLOB SERPL: 1.6 G/DL
ALP SERPL-CCNC: 82 U/L (ref 30–99)
ALT SERPL-CCNC: 12 U/L (ref 2–50)
ANION GAP SERPL CALC-SCNC: 14 MMOL/L (ref 7–16)
AST SERPL-CCNC: 20 U/L (ref 12–45)
BILIRUB SERPL-MCNC: 1.1 MG/DL (ref 0.1–1.5)
BUN SERPL-MCNC: 13 MG/DL (ref 8–22)
CALCIUM ALBUM COR SERPL-MCNC: 9.1 MG/DL (ref 8.5–10.5)
CALCIUM SERPL-MCNC: 9.2 MG/DL (ref 8.5–10.5)
CHLORIDE SERPL-SCNC: 102 MMOL/L (ref 96–112)
CHOLEST SERPL-MCNC: 122 MG/DL (ref 100–199)
CO2 SERPL-SCNC: 21 MMOL/L (ref 20–33)
CREAT SERPL-MCNC: 0.84 MG/DL (ref 0.5–1.4)
ERYTHROCYTE [DISTWIDTH] IN BLOOD BY AUTOMATED COUNT: 43.7 FL (ref 35.9–50)
GFR SERPLBLD CREATININE-BSD FMLA CKD-EPI: 69 ML/MIN/1.73 M 2
GLOBULIN SER CALC-MCNC: 2.6 G/DL (ref 1.9–3.5)
GLUCOSE BLD STRIP.AUTO-MCNC: 121 MG/DL (ref 65–99)
GLUCOSE BLD STRIP.AUTO-MCNC: 130 MG/DL (ref 65–99)
GLUCOSE BLD STRIP.AUTO-MCNC: 148 MG/DL (ref 65–99)
GLUCOSE BLD STRIP.AUTO-MCNC: 193 MG/DL (ref 65–99)
GLUCOSE SERPL-MCNC: 141 MG/DL (ref 65–99)
HCT VFR BLD AUTO: 31.3 % (ref 37–47)
HDLC SERPL-MCNC: 32 MG/DL
HGB BLD-MCNC: 10.9 G/DL (ref 12–16)
LDLC SERPL CALC-MCNC: 54 MG/DL
MAGNESIUM SERPL-MCNC: 1.3 MG/DL (ref 1.5–2.5)
MCH RBC QN AUTO: 29.7 PG (ref 27–33)
MCHC RBC AUTO-ENTMCNC: 34.8 G/DL (ref 32.2–35.5)
MCV RBC AUTO: 85.3 FL (ref 81.4–97.8)
PLATELET # BLD AUTO: 174 K/UL (ref 164–446)
PMV BLD AUTO: 9 FL (ref 9–12.9)
POTASSIUM SERPL-SCNC: 4.1 MMOL/L (ref 3.6–5.5)
PROT SERPL-MCNC: 6.7 G/DL (ref 6–8.2)
RBC # BLD AUTO: 3.67 M/UL (ref 4.2–5.4)
SODIUM SERPL-SCNC: 137 MMOL/L (ref 135–145)
TRIGL SERPL-MCNC: 178 MG/DL (ref 0–149)
WBC # BLD AUTO: 3.3 K/UL (ref 4.8–10.8)

## 2025-08-20 PROCEDURE — 700102 HCHG RX REV CODE 250 W/ 637 OVERRIDE(OP)

## 2025-08-20 PROCEDURE — 700102 HCHG RX REV CODE 250 W/ 637 OVERRIDE(OP): Performed by: HOSPITALIST

## 2025-08-20 PROCEDURE — 83735 ASSAY OF MAGNESIUM: CPT

## 2025-08-20 PROCEDURE — 96365 THER/PROPH/DIAG IV INF INIT: CPT

## 2025-08-20 PROCEDURE — A9270 NON-COVERED ITEM OR SERVICE: HCPCS

## 2025-08-20 PROCEDURE — 80053 COMPREHEN METABOLIC PANEL: CPT

## 2025-08-20 PROCEDURE — A9270 NON-COVERED ITEM OR SERVICE: HCPCS | Performed by: HOSPITALIST

## 2025-08-20 PROCEDURE — 80061 LIPID PANEL: CPT

## 2025-08-20 PROCEDURE — G0378 HOSPITAL OBSERVATION PER HR: HCPCS

## 2025-08-20 PROCEDURE — 96375 TX/PRO/DX INJ NEW DRUG ADDON: CPT

## 2025-08-20 PROCEDURE — 82962 GLUCOSE BLOOD TEST: CPT

## 2025-08-20 PROCEDURE — 85027 COMPLETE CBC AUTOMATED: CPT

## 2025-08-20 PROCEDURE — 96366 THER/PROPH/DIAG IV INF ADDON: CPT

## 2025-08-20 PROCEDURE — 700111 HCHG RX REV CODE 636 W/ 250 OVERRIDE (IP): Mod: JZ | Performed by: HOSPITALIST

## 2025-08-20 PROCEDURE — 700111 HCHG RX REV CODE 636 W/ 250 OVERRIDE (IP)

## 2025-08-20 PROCEDURE — 36415 COLL VENOUS BLD VENIPUNCTURE: CPT

## 2025-08-20 PROCEDURE — 700111 HCHG RX REV CODE 636 W/ 250 OVERRIDE (IP): Mod: JZ | Performed by: NURSE PRACTITIONER

## 2025-08-20 PROCEDURE — 70551 MRI BRAIN STEM W/O DYE: CPT

## 2025-08-20 PROCEDURE — 96372 THER/PROPH/DIAG INJ SC/IM: CPT

## 2025-08-20 RX ORDER — HYDRALAZINE HYDROCHLORIDE 20 MG/ML
10 INJECTION INTRAMUSCULAR; INTRAVENOUS EVERY 4 HOURS PRN
Status: DISCONTINUED | OUTPATIENT
Start: 2025-08-20 | End: 2025-08-21 | Stop reason: HOSPADM

## 2025-08-20 RX ORDER — TELMISARTAN 80 MG/1
80 TABLET ORAL DAILY
COMMUNITY

## 2025-08-20 RX ORDER — CARVEDILOL 25 MG/1
25 TABLET ORAL 2 TIMES DAILY WITH MEALS
Status: DISCONTINUED | OUTPATIENT
Start: 2025-08-20 | End: 2025-08-21 | Stop reason: HOSPADM

## 2025-08-20 RX ORDER — MAGNESIUM SULFATE HEPTAHYDRATE 40 MG/ML
2 INJECTION, SOLUTION INTRAVENOUS ONCE
Status: COMPLETED | OUTPATIENT
Start: 2025-08-20 | End: 2025-08-20

## 2025-08-20 RX ADMIN — OMEPRAZOLE 20 MG: 20 CAPSULE, DELAYED RELEASE ORAL at 18:43

## 2025-08-20 RX ADMIN — CITALOPRAM HYDROBROMIDE 10 MG: 20 TABLET ORAL at 06:33

## 2025-08-20 RX ADMIN — CARVEDILOL 12.5 MG: 12.5 TABLET, FILM COATED ORAL at 08:57

## 2025-08-20 RX ADMIN — HYDRALAZINE HYDROCHLORIDE 10 MG: 20 INJECTION INTRAMUSCULAR; INTRAVENOUS at 01:17

## 2025-08-20 RX ADMIN — DIAZEPAM 2.5 MG: 10 INJECTION, SOLUTION INTRAMUSCULAR; INTRAVENOUS at 18:56

## 2025-08-20 RX ADMIN — ENOXAPARIN SODIUM 40 MG: 100 INJECTION SUBCUTANEOUS at 18:42

## 2025-08-20 RX ADMIN — OMEPRAZOLE 20 MG: 20 CAPSULE, DELAYED RELEASE ORAL at 06:33

## 2025-08-20 RX ADMIN — LEVOTHYROXINE SODIUM 75 MCG: 0.07 TABLET ORAL at 06:43

## 2025-08-20 RX ADMIN — CETIRIZINE HYDROCHLORIDE 10 MG: 10 TABLET, FILM COATED ORAL at 21:04

## 2025-08-20 RX ADMIN — ATORVASTATIN CALCIUM 40 MG: 40 TABLET, FILM COATED ORAL at 06:33

## 2025-08-20 RX ADMIN — INSULIN LISPRO 1 UNITS: 100 INJECTION, SOLUTION INTRAVENOUS; SUBCUTANEOUS at 12:12

## 2025-08-20 RX ADMIN — MAGNESIUM SULFATE HEPTAHYDRATE 2 G: 2 INJECTION, SOLUTION INTRAVENOUS at 09:16

## 2025-08-20 RX ADMIN — CARVEDILOL 25 MG: 25 TABLET, FILM COATED ORAL at 18:42

## 2025-08-20 RX ADMIN — ASPIRIN 81 MG: 81 TABLET, CHEWABLE ORAL at 06:32

## 2025-08-20 ASSESSMENT — ENCOUNTER SYMPTOMS
FALLS: 0
COUGH: 0
SORE THROAT: 0
BLURRED VISION: 0
DIZZINESS: 0
NAUSEA: 0
WEAKNESS: 0
HEADACHES: 0
DEPRESSION: 0
VOMITING: 0
ABDOMINAL PAIN: 0
FLANK PAIN: 0
FEVER: 0
DOUBLE VISION: 0
PALPITATIONS: 0
SHORTNESS OF BREATH: 0
CHILLS: 0
MYALGIAS: 0

## 2025-08-20 ASSESSMENT — PATIENT HEALTH QUESTIONNAIRE - PHQ9
1. LITTLE INTEREST OR PLEASURE IN DOING THINGS: NOT AT ALL
2. FEELING DOWN, DEPRESSED, IRRITABLE, OR HOPELESS: NOT AT ALL
SUM OF ALL RESPONSES TO PHQ9 QUESTIONS 1 AND 2: 0

## 2025-08-20 ASSESSMENT — PAIN DESCRIPTION - PAIN TYPE
TYPE: ACUTE PAIN

## 2025-08-21 VITALS
HEIGHT: 64 IN | WEIGHT: 180.34 LBS | DIASTOLIC BLOOD PRESSURE: 65 MMHG | OXYGEN SATURATION: 95 % | TEMPERATURE: 97.7 F | HEART RATE: 78 BPM | SYSTOLIC BLOOD PRESSURE: 125 MMHG | BODY MASS INDEX: 30.79 KG/M2 | RESPIRATION RATE: 18 BRPM

## 2025-08-21 LAB
GLUCOSE BLD STRIP.AUTO-MCNC: 160 MG/DL (ref 65–99)
GLUCOSE BLD STRIP.AUTO-MCNC: 191 MG/DL (ref 65–99)

## 2025-08-21 PROCEDURE — 700102 HCHG RX REV CODE 250 W/ 637 OVERRIDE(OP): Performed by: HOSPITALIST

## 2025-08-21 PROCEDURE — 96372 THER/PROPH/DIAG INJ SC/IM: CPT

## 2025-08-21 PROCEDURE — G0378 HOSPITAL OBSERVATION PER HR: HCPCS

## 2025-08-21 PROCEDURE — 82962 GLUCOSE BLOOD TEST: CPT | Performed by: STUDENT IN AN ORGANIZED HEALTH CARE EDUCATION/TRAINING PROGRAM

## 2025-08-21 PROCEDURE — 97161 PT EVAL LOW COMPLEX 20 MIN: CPT

## 2025-08-21 PROCEDURE — 700111 HCHG RX REV CODE 636 W/ 250 OVERRIDE (IP): Performed by: STUDENT IN AN ORGANIZED HEALTH CARE EDUCATION/TRAINING PROGRAM

## 2025-08-21 PROCEDURE — A9270 NON-COVERED ITEM OR SERVICE: HCPCS

## 2025-08-21 PROCEDURE — 700102 HCHG RX REV CODE 250 W/ 637 OVERRIDE(OP)

## 2025-08-21 PROCEDURE — A9270 NON-COVERED ITEM OR SERVICE: HCPCS | Performed by: HOSPITALIST

## 2025-08-21 PROCEDURE — 97535 SELF CARE MNGMENT TRAINING: CPT

## 2025-08-21 PROCEDURE — 96376 TX/PRO/DX INJ SAME DRUG ADON: CPT

## 2025-08-21 PROCEDURE — 97166 OT EVAL MOD COMPLEX 45 MIN: CPT

## 2025-08-21 RX ORDER — MAGNESIUM SULFATE HEPTAHYDRATE 40 MG/ML
2 INJECTION, SOLUTION INTRAVENOUS ONCE
Status: COMPLETED | OUTPATIENT
Start: 2025-08-21 | End: 2025-08-21

## 2025-08-21 RX ORDER — ASPIRIN 81 MG/1
81 TABLET ORAL DAILY
Qty: 100 TABLET | Refills: 0 | Status: SHIPPED | OUTPATIENT
Start: 2025-08-21 | End: 2025-11-29

## 2025-08-21 RX ADMIN — ASPIRIN 81 MG: 81 TABLET, CHEWABLE ORAL at 05:38

## 2025-08-21 RX ADMIN — ATORVASTATIN CALCIUM 40 MG: 40 TABLET, FILM COATED ORAL at 05:38

## 2025-08-21 RX ADMIN — CARVEDILOL 25 MG: 25 TABLET, FILM COATED ORAL at 07:40

## 2025-08-21 RX ADMIN — INSULIN LISPRO 1 UNITS: 100 INJECTION, SOLUTION INTRAVENOUS; SUBCUTANEOUS at 11:58

## 2025-08-21 RX ADMIN — CITALOPRAM HYDROBROMIDE 10 MG: 20 TABLET ORAL at 05:38

## 2025-08-21 RX ADMIN — OMEPRAZOLE 20 MG: 20 CAPSULE, DELAYED RELEASE ORAL at 05:38

## 2025-08-21 RX ADMIN — INSULIN LISPRO 1 UNITS: 100 INJECTION, SOLUTION INTRAVENOUS; SUBCUTANEOUS at 07:46

## 2025-08-21 RX ADMIN — LEVOTHYROXINE SODIUM 75 MCG: 0.07 TABLET ORAL at 05:38

## 2025-08-21 RX ADMIN — MAGNESIUM SULFATE HEPTAHYDRATE 2 G: 2 INJECTION, SOLUTION INTRAVENOUS at 11:57

## 2025-08-21 ASSESSMENT — COGNITIVE AND FUNCTIONAL STATUS - GENERAL
MOBILITY SCORE: 24
SUGGESTED CMS G CODE MODIFIER MOBILITY: CH
SUGGESTED CMS G CODE MODIFIER DAILY ACTIVITY: CH
DAILY ACTIVITIY SCORE: 24

## 2025-08-21 ASSESSMENT — ACTIVITIES OF DAILY LIVING (ADL): TOILETING: INDEPENDENT

## 2025-08-21 ASSESSMENT — PAIN DESCRIPTION - PAIN TYPE
TYPE: ACUTE PAIN
TYPE: ACUTE PAIN

## 2025-08-21 ASSESSMENT — GAIT ASSESSMENTS
GAIT LEVEL OF ASSIST: SUPERVISED
DISTANCE (FEET): 200

## 2025-08-21 ASSESSMENT — FIBROSIS 4 INDEX: FIB4 SCORE: 2.69

## 2025-08-22 ENCOUNTER — PATIENT OUTREACH (OUTPATIENT)
Dept: MEDICAL GROUP | Facility: PHYSICIAN GROUP | Age: 80
End: 2025-08-22
Payer: MEDICARE

## 2025-08-25 ENCOUNTER — OFFICE VISIT (OUTPATIENT)
Dept: MEDICAL GROUP | Facility: PHYSICIAN GROUP | Age: 80
End: 2025-08-25
Payer: MEDICARE

## 2025-08-25 VITALS
OXYGEN SATURATION: 97 % | TEMPERATURE: 96.7 F | HEART RATE: 80 BPM | WEIGHT: 181.6 LBS | DIASTOLIC BLOOD PRESSURE: 78 MMHG | BODY MASS INDEX: 31 KG/M2 | HEIGHT: 64 IN | SYSTOLIC BLOOD PRESSURE: 140 MMHG

## 2025-08-25 DIAGNOSIS — E11.29 TYPE 2 DIABETES MELLITUS WITH DIABETIC MICROALBUMINURIA, WITHOUT LONG-TERM CURRENT USE OF INSULIN (HCC): ICD-10-CM

## 2025-08-25 DIAGNOSIS — I10 ESSENTIAL HYPERTENSION: ICD-10-CM

## 2025-08-25 DIAGNOSIS — Z09 HOSPITAL DISCHARGE FOLLOW-UP: Primary | ICD-10-CM

## 2025-08-25 DIAGNOSIS — R20.0 NUMBNESS: ICD-10-CM

## 2025-08-25 DIAGNOSIS — R80.9 TYPE 2 DIABETES MELLITUS WITH DIABETIC MICROALBUMINURIA, WITHOUT LONG-TERM CURRENT USE OF INSULIN (HCC): ICD-10-CM

## 2025-08-25 DIAGNOSIS — H43.822 VITREOMACULAR TRACTION SYNDROME OF LEFT EYE: ICD-10-CM

## 2025-08-25 RX ORDER — PRAZOSIN HYDROCHLORIDE 1 MG/1
1 CAPSULE ORAL
COMMUNITY

## 2025-08-25 ASSESSMENT — FIBROSIS 4 INDEX: FIB4 SCORE: 2.69

## 2025-08-29 ENCOUNTER — TELEPHONE (OUTPATIENT)
Dept: HEALTH INFORMATION MANAGEMENT | Facility: OTHER | Age: 80
End: 2025-08-29
Payer: MEDICARE

## (undated) DEVICE — TUBING CLEARLINK DUO-VENT - C-FLO (48EA/CA)

## (undated) DEVICE — SUTURE 2-0 VICRYL PLUS SH - 27 INCH (36/BX)

## (undated) DEVICE — PROTECTOR ULNA NERVE - (36PR/CA)

## (undated) DEVICE — SENSOR SPO2 NEO LNCS ADHESIVE (20/BX) SEE USER NOTES

## (undated) DEVICE — KIT ANESTHESIA W/CIRCUIT & 3/LT BAG W/FILTER (20EA/CA)

## (undated) DEVICE — TUBE CONNECT SUCTION CLEAR 120 X 1/4" (50EA/CA)"

## (undated) DEVICE — SET LEADWIRE 5 LEAD BEDSIDE DISPOSABLE ECG (1SET OF 5/EA)

## (undated) DEVICE — SLEEVE, VASO, THIGH, MED

## (undated) DEVICE — ELECTRODE BIPOLAR SMALL CUTTING LOOP URO 24/26 FR (6EA/PK)

## (undated) DEVICE — CHLORAPREP 26 ML APPLICATOR - ORANGE TINT(25/CA)

## (undated) DEVICE — SYRINGE 50ML CATHETER TIP (40EA/BX 4BX/CA)

## (undated) DEVICE — TOURNIQUET CUFF 18 X 3 ONE PORT DISP - STERILE (10/BX)

## (undated) DEVICE — HEAD HOLDER JUNIOR/ADULT

## (undated) DEVICE — SPONGE GAUZESTER 4 X 4 4PLY - (128PK/CA)

## (undated) DEVICE — SODIUM CHL IRRIGATION 0.9% 1000ML (12EA/CA)

## (undated) DEVICE — EVACUATOR BLADDER ELLIK - (10/BX)

## (undated) DEVICE — SET EXTENSION WITH 2 PORTS (48EA/CA) ***PART #2C8610 IS A SUBSTITUTE*****

## (undated) DEVICE — GOWN WARMING STANDARD FLEX - (30/CA)

## (undated) DEVICE — TOWEL STOP TIMEOUT SAFETY FLAG (40EA/CA)

## (undated) DEVICE — PACK CYSTO III (2EA/CA)

## (undated) DEVICE — LACTATED RINGERS INJ 1000 ML - (14EA/CA 60CA/PF)

## (undated) DEVICE — NEPTUNE 4 PORT MANIFOLD - (20/PK)

## (undated) DEVICE — MASK AIRWAY SIZE 3 UNIQUE SILICON (10/BX)

## (undated) DEVICE — CONNECTOR HOSE NEPTUNE FOR CYSTO ROOM

## (undated) DEVICE — PACK SINGLE BASIN - (6/CA)

## (undated) DEVICE — SENSOR OXIMETER ADULT SPO2 RD SET (20EA/BX)

## (undated) DEVICE — SUCTION INSTRUMENT YANKAUER BULBOUS TIP W/O VENT (50EA/CA)

## (undated) DEVICE — CANISTER SUCTION 3000ML MECHANICAL FILTER AUTO SHUTOFF MEDI-VAC NONSTERILE LF DISP  (40EA/CA)

## (undated) DEVICE — MASK ANESTHESIA ADULT  - (100/CA)

## (undated) DEVICE — ELECTRODE DUAL RETURN W/ CORD - (50/PK)

## (undated) DEVICE — SUTURE GENERAL

## (undated) DEVICE — PACK LOWER EXTREMITY - (2/CA)

## (undated) DEVICE — BAG URODRAIN WITH TUBING - (20/CA)

## (undated) DEVICE — COVER FOOT UNIVERSAL DISP. - (25EA/CA)

## (undated) DEVICE — GLOVE BIOGEL PI INDICATOR SZ 7.5 SURGICAL PF LF -(50/BX 4BX/CA)

## (undated) DEVICE — ELECTRODE 850 FOAM ADHESIVE - HYDROGEL RADIOTRNSPRNT (50/PK)

## (undated) DEVICE — WATER IRRIGATION STERILE 1000ML (12EA/CA)

## (undated) DEVICE — GLOVE BIOGEL SZ 7.5 SURGICAL PF LTX - (50PR/BX 4BX/CA)

## (undated) DEVICE — SUTURE 3-0 ETHILON FS-1 - (36/BX) 30 INCH

## (undated) DEVICE — COVER LIGHT HANDLE ALC PLUS DISP (18EA/BX)

## (undated) DEVICE — CONTAINER SPECIMEN BAG OR - STERILE 4 OZ W/LID (100EA/CA)